# Patient Record
Sex: MALE | Race: WHITE | NOT HISPANIC OR LATINO | Employment: OTHER | URBAN - METROPOLITAN AREA
[De-identification: names, ages, dates, MRNs, and addresses within clinical notes are randomized per-mention and may not be internally consistent; named-entity substitution may affect disease eponyms.]

---

## 2020-06-05 ENCOUNTER — HOSPITAL ENCOUNTER (INPATIENT)
Facility: HOSPITAL | Age: 70
LOS: 1 days | DRG: 918 | End: 2020-06-07
Attending: EMERGENCY MEDICINE | Admitting: STUDENT IN AN ORGANIZED HEALTH CARE EDUCATION/TRAINING PROGRAM
Payer: MEDICARE

## 2020-06-05 DIAGNOSIS — F32.A DEPRESSION: ICD-10-CM

## 2020-06-05 DIAGNOSIS — T40.3X2A: Primary | ICD-10-CM

## 2020-06-05 DIAGNOSIS — T14.91XA SUICIDE ATTEMPT (HCC): ICD-10-CM

## 2020-06-05 DIAGNOSIS — F11.23 OPIATE WITHDRAWAL (HCC): ICD-10-CM

## 2020-06-05 LAB
ALBUMIN SERPL BCP-MCNC: 3.9 G/DL (ref 3.5–5)
ALP SERPL-CCNC: 62 U/L (ref 46–116)
ALT SERPL W P-5'-P-CCNC: 47 U/L (ref 12–78)
AMPHETAMINES SERPL QL SCN: NEGATIVE
ANION GAP SERPL CALCULATED.3IONS-SCNC: 11 MMOL/L (ref 4–13)
APAP SERPL-MCNC: <2 UG/ML (ref 10–20)
APTT PPP: 28 SECONDS (ref 23–37)
AST SERPL W P-5'-P-CCNC: 27 U/L (ref 5–45)
BARBITURATES UR QL: NEGATIVE
BASOPHILS # BLD AUTO: 0.05 THOUSANDS/ΜL (ref 0–0.1)
BASOPHILS NFR BLD AUTO: 0 % (ref 0–1)
BENZODIAZ UR QL: NEGATIVE
BILIRUB DIRECT SERPL-MCNC: 0.14 MG/DL (ref 0–0.2)
BILIRUB SERPL-MCNC: 0.3 MG/DL (ref 0.2–1)
BUN SERPL-MCNC: 11 MG/DL (ref 5–25)
CALCIUM SERPL-MCNC: 9.9 MG/DL (ref 8.3–10.1)
CHLORIDE SERPL-SCNC: 100 MMOL/L (ref 100–108)
CO2 SERPL-SCNC: 29 MMOL/L (ref 21–32)
COCAINE UR QL: NEGATIVE
CREAT SERPL-MCNC: 1.24 MG/DL (ref 0.6–1.3)
EOSINOPHIL # BLD AUTO: 0.01 THOUSAND/ΜL (ref 0–0.61)
EOSINOPHIL NFR BLD AUTO: 0 % (ref 0–6)
ERYTHROCYTE [DISTWIDTH] IN BLOOD BY AUTOMATED COUNT: 12.6 % (ref 11.6–15.1)
ETHANOL SERPL-MCNC: <3 MG/DL (ref 0–3)
GFR SERPL CREATININE-BSD FRML MDRD: 60 ML/MIN/1.73SQ M
GLUCOSE SERPL-MCNC: 154 MG/DL (ref 65–140)
HCT VFR BLD AUTO: 42.6 % (ref 36.5–49.3)
HGB BLD-MCNC: 14.2 G/DL (ref 12–17)
IMM GRANULOCYTES # BLD AUTO: 0.05 THOUSAND/UL (ref 0–0.2)
IMM GRANULOCYTES NFR BLD AUTO: 0 % (ref 0–2)
INR PPP: 1.03 (ref 0.84–1.19)
LYMPHOCYTES # BLD AUTO: 1.63 THOUSANDS/ΜL (ref 0.6–4.47)
LYMPHOCYTES NFR BLD AUTO: 13 % (ref 14–44)
MCH RBC QN AUTO: 32.5 PG (ref 26.8–34.3)
MCHC RBC AUTO-ENTMCNC: 33.3 G/DL (ref 31.4–37.4)
MCV RBC AUTO: 98 FL (ref 82–98)
METHADONE UR QL: POSITIVE
MONOCYTES # BLD AUTO: 0.63 THOUSAND/ΜL (ref 0.17–1.22)
MONOCYTES NFR BLD AUTO: 5 % (ref 4–12)
NEUTROPHILS # BLD AUTO: 10.31 THOUSANDS/ΜL (ref 1.85–7.62)
NEUTS SEG NFR BLD AUTO: 82 % (ref 43–75)
NRBC BLD AUTO-RTO: 0 /100 WBCS
OPIATES UR QL SCN: NEGATIVE
PCP UR QL: NEGATIVE
PLATELET # BLD AUTO: 231 THOUSANDS/UL (ref 149–390)
PMV BLD AUTO: 8.5 FL (ref 8.9–12.7)
POTASSIUM SERPL-SCNC: 4.1 MMOL/L (ref 3.5–5.3)
PROT SERPL-MCNC: 7.1 G/DL (ref 6.4–8.2)
PROTHROMBIN TIME: 13.5 SECONDS (ref 11.6–14.5)
RBC # BLD AUTO: 4.37 MILLION/UL (ref 3.88–5.62)
SALICYLATES SERPL-MCNC: 5 MG/DL (ref 3–20)
SODIUM SERPL-SCNC: 140 MMOL/L (ref 136–145)
THC UR QL: POSITIVE
TROPONIN I SERPL-MCNC: <0.02 NG/ML
WBC # BLD AUTO: 12.68 THOUSAND/UL (ref 4.31–10.16)

## 2020-06-05 PROCEDURE — 84484 ASSAY OF TROPONIN QUANT: CPT | Performed by: PHYSICIAN ASSISTANT

## 2020-06-05 PROCEDURE — NC001 PR NO CHARGE: Performed by: EMERGENCY MEDICINE

## 2020-06-05 PROCEDURE — 99220 PR INITIAL OBSERVATION CARE/DAY 70 MINUTES: CPT | Performed by: STUDENT IN AN ORGANIZED HEALTH CARE EDUCATION/TRAINING PROGRAM

## 2020-06-05 PROCEDURE — 96360 HYDRATION IV INFUSION INIT: CPT

## 2020-06-05 PROCEDURE — 80076 HEPATIC FUNCTION PANEL: CPT | Performed by: PHYSICIAN ASSISTANT

## 2020-06-05 PROCEDURE — 80307 DRUG TEST PRSMV CHEM ANLYZR: CPT | Performed by: PHYSICIAN ASSISTANT

## 2020-06-05 PROCEDURE — 99285 EMERGENCY DEPT VISIT HI MDM: CPT | Performed by: PHYSICIAN ASSISTANT

## 2020-06-05 PROCEDURE — 99449 NTRPROF PH1/NTRNET/EHR 31/>: CPT | Performed by: EMERGENCY MEDICINE

## 2020-06-05 PROCEDURE — 85025 COMPLETE CBC W/AUTO DIFF WBC: CPT | Performed by: PHYSICIAN ASSISTANT

## 2020-06-05 PROCEDURE — 93005 ELECTROCARDIOGRAM TRACING: CPT

## 2020-06-05 PROCEDURE — 80320 DRUG SCREEN QUANTALCOHOLS: CPT | Performed by: PHYSICIAN ASSISTANT

## 2020-06-05 PROCEDURE — 85730 THROMBOPLASTIN TIME PARTIAL: CPT | Performed by: PHYSICIAN ASSISTANT

## 2020-06-05 PROCEDURE — 99285 EMERGENCY DEPT VISIT HI MDM: CPT

## 2020-06-05 PROCEDURE — 85610 PROTHROMBIN TIME: CPT | Performed by: PHYSICIAN ASSISTANT

## 2020-06-05 PROCEDURE — 80048 BASIC METABOLIC PNL TOTAL CA: CPT | Performed by: PHYSICIAN ASSISTANT

## 2020-06-05 PROCEDURE — 36415 COLL VENOUS BLD VENIPUNCTURE: CPT | Performed by: PHYSICIAN ASSISTANT

## 2020-06-05 PROCEDURE — 80329 ANALGESICS NON-OPIOID 1 OR 2: CPT | Performed by: PHYSICIAN ASSISTANT

## 2020-06-05 RX ORDER — ONDANSETRON 2 MG/ML
4 INJECTION INTRAMUSCULAR; INTRAVENOUS EVERY 6 HOURS PRN
Status: DISCONTINUED | OUTPATIENT
Start: 2020-06-05 | End: 2020-06-07 | Stop reason: HOSPADM

## 2020-06-05 RX ORDER — ACETAMINOPHEN 325 MG/1
650 TABLET ORAL EVERY 6 HOURS PRN
Status: DISCONTINUED | OUTPATIENT
Start: 2020-06-05 | End: 2020-06-07 | Stop reason: HOSPADM

## 2020-06-05 RX ORDER — CLONIDINE HYDROCHLORIDE 0.1 MG/1
0.1 TABLET ORAL EVERY 12 HOURS SCHEDULED
Status: DISCONTINUED | OUTPATIENT
Start: 2020-06-05 | End: 2020-06-07 | Stop reason: HOSPADM

## 2020-06-05 RX ORDER — ONDANSETRON 2 MG/ML
1 INJECTION INTRAMUSCULAR; INTRAVENOUS ONCE
Status: COMPLETED | OUTPATIENT
Start: 2020-06-05 | End: 2020-06-05

## 2020-06-05 RX ORDER — METOCLOPRAMIDE HYDROCHLORIDE 5 MG/ML
10 INJECTION INTRAMUSCULAR; INTRAVENOUS EVERY 6 HOURS PRN
Status: DISCONTINUED | OUTPATIENT
Start: 2020-06-05 | End: 2020-06-06

## 2020-06-05 RX ORDER — NICOTINE 21 MG/24HR
1 PATCH, TRANSDERMAL 24 HOURS TRANSDERMAL DAILY
Status: DISCONTINUED | OUTPATIENT
Start: 2020-06-05 | End: 2020-06-07 | Stop reason: HOSPADM

## 2020-06-05 RX ORDER — MIRTAZAPINE 15 MG/1
15 TABLET, FILM COATED ORAL
Status: DISCONTINUED | OUTPATIENT
Start: 2020-06-05 | End: 2020-06-07 | Stop reason: HOSPADM

## 2020-06-05 RX ORDER — CALCIUM CARBONATE 200(500)MG
1000 TABLET,CHEWABLE ORAL DAILY PRN
Status: DISCONTINUED | OUTPATIENT
Start: 2020-06-05 | End: 2020-06-07 | Stop reason: HOSPADM

## 2020-06-05 RX ORDER — DOCUSATE SODIUM 100 MG/1
100 CAPSULE, LIQUID FILLED ORAL 2 TIMES DAILY
Status: DISCONTINUED | OUTPATIENT
Start: 2020-06-05 | End: 2020-06-07 | Stop reason: HOSPADM

## 2020-06-05 RX ORDER — SENNOSIDES 8.6 MG
1 TABLET ORAL DAILY
Status: DISCONTINUED | OUTPATIENT
Start: 2020-06-05 | End: 2020-06-07 | Stop reason: HOSPADM

## 2020-06-05 RX ORDER — LORAZEPAM 2 MG/ML
1 INJECTION INTRAMUSCULAR ONCE
Status: COMPLETED | OUTPATIENT
Start: 2020-06-05 | End: 2020-06-05

## 2020-06-05 RX ADMIN — LORAZEPAM 1 MG: 2 INJECTION INTRAMUSCULAR; INTRAVENOUS at 14:28

## 2020-06-05 RX ADMIN — SODIUM CHLORIDE 1000 ML: 0.9 INJECTION, SOLUTION INTRAVENOUS at 07:41

## 2020-06-05 RX ADMIN — CLONIDINE HYDROCHLORIDE 0.1 MG: 0.1 TABLET ORAL at 20:01

## 2020-06-05 RX ADMIN — CLONIDINE HYDROCHLORIDE 0.1 MG: 0.1 TABLET ORAL at 14:51

## 2020-06-05 RX ADMIN — MIRTAZAPINE 15 MG: 15 TABLET, FILM COATED ORAL at 22:31

## 2020-06-05 RX ADMIN — ENOXAPARIN SODIUM 40 MG: 40 INJECTION SUBCUTANEOUS at 13:17

## 2020-06-06 VITALS
HEART RATE: 54 BPM | WEIGHT: 162.48 LBS | RESPIRATION RATE: 18 BRPM | TEMPERATURE: 98.2 F | OXYGEN SATURATION: 98 % | HEIGHT: 70 IN | SYSTOLIC BLOOD PRESSURE: 135 MMHG | BODY MASS INDEX: 23.26 KG/M2 | DIASTOLIC BLOOD PRESSURE: 63 MMHG

## 2020-06-06 PROBLEM — F11.23 METHADONE WITHDRAWAL (HCC): Status: RESOLVED | Noted: 2020-06-05 | Resolved: 2020-06-06

## 2020-06-06 LAB
ALBUMIN SERPL BCP-MCNC: 3.6 G/DL (ref 3.5–5)
ALP SERPL-CCNC: 54 U/L (ref 46–116)
ALT SERPL W P-5'-P-CCNC: 40 U/L (ref 12–78)
ANION GAP SERPL CALCULATED.3IONS-SCNC: 4 MMOL/L (ref 4–13)
AST SERPL W P-5'-P-CCNC: 28 U/L (ref 5–45)
ATRIAL RATE: 50 BPM
ATRIAL RATE: 70 BPM
ATRIAL RATE: 86 BPM
BASOPHILS # BLD AUTO: 0.09 THOUSANDS/ΜL (ref 0–0.1)
BASOPHILS NFR BLD AUTO: 1 % (ref 0–1)
BILIRUB SERPL-MCNC: 0.5 MG/DL (ref 0.2–1)
BUN SERPL-MCNC: 9 MG/DL (ref 5–25)
CALCIUM SERPL-MCNC: 9.1 MG/DL (ref 8.3–10.1)
CHLORIDE SERPL-SCNC: 105 MMOL/L (ref 100–108)
CO2 SERPL-SCNC: 33 MMOL/L (ref 21–32)
CREAT SERPL-MCNC: 1 MG/DL (ref 0.6–1.3)
EOSINOPHIL # BLD AUTO: 0.27 THOUSAND/ΜL (ref 0–0.61)
EOSINOPHIL NFR BLD AUTO: 3 % (ref 0–6)
ERYTHROCYTE [DISTWIDTH] IN BLOOD BY AUTOMATED COUNT: 12.8 % (ref 11.6–15.1)
GFR SERPL CREATININE-BSD FRML MDRD: 78 ML/MIN/1.73SQ M
GLUCOSE SERPL-MCNC: 83 MG/DL (ref 65–140)
HCT VFR BLD AUTO: 41 % (ref 36.5–49.3)
HGB BLD-MCNC: 13.3 G/DL (ref 12–17)
IMM GRANULOCYTES # BLD AUTO: 0.02 THOUSAND/UL (ref 0–0.2)
IMM GRANULOCYTES NFR BLD AUTO: 0 % (ref 0–2)
LYMPHOCYTES # BLD AUTO: 3.73 THOUSANDS/ΜL (ref 0.6–4.47)
LYMPHOCYTES NFR BLD AUTO: 46 % (ref 14–44)
MAGNESIUM SERPL-MCNC: 2 MG/DL (ref 1.6–2.6)
MCH RBC QN AUTO: 32.5 PG (ref 26.8–34.3)
MCHC RBC AUTO-ENTMCNC: 32.4 G/DL (ref 31.4–37.4)
MCV RBC AUTO: 100 FL (ref 82–98)
MONOCYTES # BLD AUTO: 0.7 THOUSAND/ΜL (ref 0.17–1.22)
MONOCYTES NFR BLD AUTO: 9 % (ref 4–12)
NEUTROPHILS # BLD AUTO: 3.38 THOUSANDS/ΜL (ref 1.85–7.62)
NEUTS SEG NFR BLD AUTO: 41 % (ref 43–75)
NRBC BLD AUTO-RTO: 0 /100 WBCS
P AXIS: 79 DEGREES
P AXIS: 80 DEGREES
P AXIS: 91 DEGREES
PLATELET # BLD AUTO: 181 THOUSANDS/UL (ref 149–390)
PMV BLD AUTO: 8.5 FL (ref 8.9–12.7)
POTASSIUM SERPL-SCNC: 5 MMOL/L (ref 3.5–5.3)
PR INTERVAL: 160 MS
PR INTERVAL: 166 MS
PR INTERVAL: 168 MS
PROT SERPL-MCNC: 6.7 G/DL (ref 6.4–8.2)
QRS AXIS: 23 DEGREES
QRS AXIS: 47 DEGREES
QRS AXIS: 73 DEGREES
QRSD INTERVAL: 102 MS
QRSD INTERVAL: 94 MS
QRSD INTERVAL: 98 MS
QT INTERVAL: 330 MS
QT INTERVAL: 398 MS
QT INTERVAL: 466 MS
QTC INTERVAL: 394 MS
QTC INTERVAL: 424 MS
QTC INTERVAL: 429 MS
RBC # BLD AUTO: 4.09 MILLION/UL (ref 3.88–5.62)
SARS-COV-2 RNA RESP QL NAA+PROBE: NEGATIVE
SODIUM SERPL-SCNC: 142 MMOL/L (ref 136–145)
T WAVE AXIS: 58 DEGREES
T WAVE AXIS: 66 DEGREES
T WAVE AXIS: 69 DEGREES
VENTRICULAR RATE: 50 BPM
VENTRICULAR RATE: 70 BPM
VENTRICULAR RATE: 86 BPM
WBC # BLD AUTO: 8.19 THOUSAND/UL (ref 4.31–10.16)

## 2020-06-06 PROCEDURE — 93005 ELECTROCARDIOGRAM TRACING: CPT

## 2020-06-06 PROCEDURE — 87635 SARS-COV-2 COVID-19 AMP PRB: CPT | Performed by: STUDENT IN AN ORGANIZED HEALTH CARE EDUCATION/TRAINING PROGRAM

## 2020-06-06 PROCEDURE — 93010 ELECTROCARDIOGRAM REPORT: CPT | Performed by: INTERNAL MEDICINE

## 2020-06-06 PROCEDURE — 99239 HOSP IP/OBS DSCHRG MGMT >30: CPT | Performed by: STUDENT IN AN ORGANIZED HEALTH CARE EDUCATION/TRAINING PROGRAM

## 2020-06-06 PROCEDURE — 85025 COMPLETE CBC W/AUTO DIFF WBC: CPT | Performed by: STUDENT IN AN ORGANIZED HEALTH CARE EDUCATION/TRAINING PROGRAM

## 2020-06-06 PROCEDURE — 80053 COMPREHEN METABOLIC PANEL: CPT | Performed by: STUDENT IN AN ORGANIZED HEALTH CARE EDUCATION/TRAINING PROGRAM

## 2020-06-06 PROCEDURE — 83735 ASSAY OF MAGNESIUM: CPT | Performed by: STUDENT IN AN ORGANIZED HEALTH CARE EDUCATION/TRAINING PROGRAM

## 2020-06-06 RX ORDER — SENNOSIDES 8.6 MG
1 TABLET ORAL DAILY
Status: CANCELLED | OUTPATIENT
Start: 2020-06-07

## 2020-06-06 RX ORDER — CALCIUM CARBONATE 200(500)MG
1000 TABLET,CHEWABLE ORAL DAILY PRN
Status: CANCELLED | OUTPATIENT
Start: 2020-06-06

## 2020-06-06 RX ORDER — ACETAMINOPHEN 325 MG/1
325 TABLET ORAL EVERY 6 HOURS PRN
Status: CANCELLED | OUTPATIENT
Start: 2020-06-06

## 2020-06-06 RX ORDER — LORAZEPAM 2 MG/ML
1 INJECTION INTRAMUSCULAR ONCE
Status: COMPLETED | OUTPATIENT
Start: 2020-06-06 | End: 2020-06-06

## 2020-06-06 RX ORDER — MAGNESIUM HYDROXIDE/ALUMINUM HYDROXICE/SIMETHICONE 120; 1200; 1200 MG/30ML; MG/30ML; MG/30ML
15 SUSPENSION ORAL EVERY 4 HOURS PRN
Status: CANCELLED | OUTPATIENT
Start: 2020-06-06

## 2020-06-06 RX ORDER — METHOCARBAMOL 500 MG/1
500 TABLET, FILM COATED ORAL EVERY 6 HOURS PRN
Status: DISCONTINUED | OUTPATIENT
Start: 2020-06-06 | End: 2020-06-06

## 2020-06-06 RX ORDER — AMOXICILLIN 250 MG
1 CAPSULE ORAL DAILY PRN
Status: CANCELLED | OUTPATIENT
Start: 2020-06-06

## 2020-06-06 RX ORDER — NICOTINE 21 MG/24HR
1 PATCH, TRANSDERMAL 24 HOURS TRANSDERMAL DAILY
Status: CANCELLED | OUTPATIENT
Start: 2020-06-07

## 2020-06-06 RX ORDER — RISPERIDONE 0.5 MG/1
0.5 TABLET, ORALLY DISINTEGRATING ORAL EVERY 8 HOURS PRN
Status: CANCELLED | OUTPATIENT
Start: 2020-06-06

## 2020-06-06 RX ORDER — METHOCARBAMOL 500 MG/1
500 TABLET, FILM COATED ORAL EVERY 6 HOURS PRN
Status: CANCELLED | OUTPATIENT
Start: 2020-06-06 | End: 2020-06-08

## 2020-06-06 RX ORDER — ACETAMINOPHEN 325 MG/1
650 TABLET ORAL EVERY 6 HOURS PRN
Status: CANCELLED | OUTPATIENT
Start: 2020-06-06

## 2020-06-06 RX ORDER — DOCUSATE SODIUM 100 MG/1
100 CAPSULE, LIQUID FILLED ORAL 2 TIMES DAILY
Status: CANCELLED | OUTPATIENT
Start: 2020-06-07

## 2020-06-06 RX ORDER — HALOPERIDOL 5 MG/ML
2 INJECTION INTRAMUSCULAR EVERY 6 HOURS PRN
Status: CANCELLED | OUTPATIENT
Start: 2020-06-06

## 2020-06-06 RX ORDER — HALOPERIDOL 1 MG/1
2 TABLET ORAL EVERY 6 HOURS PRN
Status: CANCELLED | OUTPATIENT
Start: 2020-06-06

## 2020-06-06 RX ORDER — HYDROXYZINE HYDROCHLORIDE 25 MG/1
25 TABLET, FILM COATED ORAL EVERY 8 HOURS PRN
Status: CANCELLED | OUTPATIENT
Start: 2020-06-06

## 2020-06-06 RX ORDER — CLONIDINE HYDROCHLORIDE 0.1 MG/1
0.1 TABLET ORAL EVERY 12 HOURS SCHEDULED
Status: CANCELLED | OUTPATIENT
Start: 2020-06-07

## 2020-06-06 RX ORDER — ONDANSETRON 2 MG/ML
4 INJECTION INTRAMUSCULAR; INTRAVENOUS EVERY 6 HOURS PRN
Status: CANCELLED | OUTPATIENT
Start: 2020-06-06

## 2020-06-06 RX ORDER — MIRTAZAPINE 15 MG/1
15 TABLET, FILM COATED ORAL
Status: CANCELLED | OUTPATIENT
Start: 2020-06-07

## 2020-06-06 RX ADMIN — LORAZEPAM 1 MG: 2 INJECTION INTRAMUSCULAR; INTRAVENOUS at 16:33

## 2020-06-06 RX ADMIN — CLONIDINE HYDROCHLORIDE 0.1 MG: 0.1 TABLET ORAL at 08:12

## 2020-06-06 RX ADMIN — MIRTAZAPINE 15 MG: 15 TABLET, FILM COATED ORAL at 22:54

## 2020-06-06 RX ADMIN — CLONIDINE HYDROCHLORIDE 0.1 MG: 0.1 TABLET ORAL at 22:54

## 2020-06-06 RX ADMIN — ENOXAPARIN SODIUM 40 MG: 40 INJECTION SUBCUTANEOUS at 08:13

## 2020-06-07 ENCOUNTER — HOSPITAL ENCOUNTER (INPATIENT)
Facility: HOSPITAL | Age: 70
LOS: 10 days | Discharge: HOME | DRG: 881 | End: 2020-06-17
Attending: PSYCHIATRY & NEUROLOGY | Admitting: PSYCHIATRY & NEUROLOGY
Payer: MEDICARE

## 2020-06-07 DIAGNOSIS — J45.909 ASTHMA: ICD-10-CM

## 2020-06-07 DIAGNOSIS — F32.9 MAJOR DEPRESSIVE DISORDER WITHOUT PSYCHOTIC FEATURES: Primary | ICD-10-CM

## 2020-06-07 DIAGNOSIS — G89.4 CHRONIC PAIN SYNDROME: ICD-10-CM

## 2020-06-07 DIAGNOSIS — F11.23 OPIATE WITHDRAWAL (HCC): ICD-10-CM

## 2020-06-07 PROCEDURE — 99221 1ST HOSP IP/OBS SF/LOW 40: CPT | Performed by: PSYCHIATRY & NEUROLOGY

## 2020-06-07 PROCEDURE — 93005 ELECTROCARDIOGRAM TRACING: CPT

## 2020-06-07 RX ORDER — MIRTAZAPINE 15 MG/1
15 TABLET, FILM COATED ORAL
Status: DISCONTINUED | OUTPATIENT
Start: 2020-06-07 | End: 2020-06-09

## 2020-06-07 RX ORDER — METHOCARBAMOL 500 MG/1
500 TABLET, FILM COATED ORAL EVERY 6 HOURS PRN
Status: DISPENSED | OUTPATIENT
Start: 2020-06-07 | End: 2020-06-09

## 2020-06-07 RX ORDER — AMOXICILLIN 250 MG
1 CAPSULE ORAL DAILY PRN
Status: DISCONTINUED | OUTPATIENT
Start: 2020-06-07 | End: 2020-06-17 | Stop reason: HOSPADM

## 2020-06-07 RX ORDER — SENNOSIDES 8.6 MG
1 TABLET ORAL DAILY
Status: DISCONTINUED | OUTPATIENT
Start: 2020-06-07 | End: 2020-06-08

## 2020-06-07 RX ORDER — MAGNESIUM HYDROXIDE/ALUMINUM HYDROXICE/SIMETHICONE 120; 1200; 1200 MG/30ML; MG/30ML; MG/30ML
15 SUSPENSION ORAL EVERY 4 HOURS PRN
Status: DISCONTINUED | OUTPATIENT
Start: 2020-06-07 | End: 2020-06-17 | Stop reason: HOSPADM

## 2020-06-07 RX ORDER — ACETAMINOPHEN 325 MG/1
650 TABLET ORAL EVERY 6 HOURS PRN
Status: DISCONTINUED | OUTPATIENT
Start: 2020-06-07 | End: 2020-06-08

## 2020-06-07 RX ORDER — DOCUSATE SODIUM 100 MG/1
100 CAPSULE, LIQUID FILLED ORAL 2 TIMES DAILY
Status: DISCONTINUED | OUTPATIENT
Start: 2020-06-07 | End: 2020-06-08

## 2020-06-07 RX ORDER — CLONIDINE HYDROCHLORIDE 0.1 MG/1
0.1 TABLET ORAL EVERY 12 HOURS SCHEDULED
Status: DISCONTINUED | OUTPATIENT
Start: 2020-06-07 | End: 2020-06-08

## 2020-06-07 RX ORDER — HYDROXYZINE HYDROCHLORIDE 25 MG/1
25 TABLET, FILM COATED ORAL EVERY 8 HOURS PRN
Status: DISCONTINUED | OUTPATIENT
Start: 2020-06-07 | End: 2020-06-17 | Stop reason: HOSPADM

## 2020-06-07 RX ORDER — HALOPERIDOL 2 MG/1
2 TABLET ORAL EVERY 6 HOURS PRN
Status: DISCONTINUED | OUTPATIENT
Start: 2020-06-07 | End: 2020-06-17 | Stop reason: HOSPADM

## 2020-06-07 RX ORDER — ONDANSETRON 4 MG/1
4 TABLET, ORALLY DISINTEGRATING ORAL EVERY 6 HOURS PRN
Status: DISCONTINUED | OUTPATIENT
Start: 2020-06-07 | End: 2020-06-17 | Stop reason: HOSPADM

## 2020-06-07 RX ORDER — CLONIDINE HYDROCHLORIDE 0.1 MG/1
0.1 TABLET ORAL ONCE
Status: COMPLETED | OUTPATIENT
Start: 2020-06-07 | End: 2020-06-07

## 2020-06-07 RX ORDER — ACETAMINOPHEN 325 MG/1
325 TABLET ORAL EVERY 6 HOURS PRN
Status: DISCONTINUED | OUTPATIENT
Start: 2020-06-07 | End: 2020-06-17 | Stop reason: HOSPADM

## 2020-06-07 RX ORDER — LORAZEPAM 1 MG/1
2 TABLET ORAL ONCE
Status: COMPLETED | OUTPATIENT
Start: 2020-06-07 | End: 2020-06-07

## 2020-06-07 RX ORDER — CALCIUM CARBONATE 200(500)MG
1000 TABLET,CHEWABLE ORAL DAILY PRN
Status: DISCONTINUED | OUTPATIENT
Start: 2020-06-07 | End: 2020-06-17 | Stop reason: HOSPADM

## 2020-06-07 RX ORDER — ACETAMINOPHEN 325 MG/1
650 TABLET ORAL EVERY 6 HOURS PRN
Status: DISCONTINUED | OUTPATIENT
Start: 2020-06-07 | End: 2020-06-17 | Stop reason: HOSPADM

## 2020-06-07 RX ORDER — RISPERIDONE 0.5 MG/1
0.5 TABLET, ORALLY DISINTEGRATING ORAL EVERY 8 HOURS PRN
Status: DISCONTINUED | OUTPATIENT
Start: 2020-06-07 | End: 2020-06-17 | Stop reason: HOSPADM

## 2020-06-07 RX ORDER — ONDANSETRON 2 MG/ML
4 INJECTION INTRAMUSCULAR; INTRAVENOUS EVERY 6 HOURS PRN
Status: DISCONTINUED | OUTPATIENT
Start: 2020-06-07 | End: 2020-06-07

## 2020-06-07 RX ORDER — NICOTINE 21 MG/24HR
1 PATCH, TRANSDERMAL 24 HOURS TRANSDERMAL DAILY
Status: DISCONTINUED | OUTPATIENT
Start: 2020-06-07 | End: 2020-06-08

## 2020-06-07 RX ORDER — HALOPERIDOL 5 MG/ML
2 INJECTION INTRAMUSCULAR EVERY 6 HOURS PRN
Status: DISCONTINUED | OUTPATIENT
Start: 2020-06-07 | End: 2020-06-17 | Stop reason: HOSPADM

## 2020-06-07 RX ADMIN — HYDROXYZINE HYDROCHLORIDE 25 MG: 25 TABLET, FILM COATED ORAL at 02:01

## 2020-06-07 RX ADMIN — MIRTAZAPINE 15 MG: 15 TABLET, FILM COATED ORAL at 21:44

## 2020-06-07 RX ADMIN — CLONIDINE HYDROCHLORIDE 0.1 MG: 0.1 TABLET ORAL at 02:00

## 2020-06-07 RX ADMIN — ONDANSETRON 4 MG: 2 INJECTION INTRAMUSCULAR; INTRAVENOUS at 02:00

## 2020-06-07 RX ADMIN — METHOCARBAMOL TABLETS 500 MG: 500 TABLET, COATED ORAL at 02:01

## 2020-06-07 RX ADMIN — LORAZEPAM 2 MG: 1 TABLET ORAL at 02:00

## 2020-06-07 RX ADMIN — CALCIUM CARBONATE (ANTACID) CHEW TAB 500 MG 1000 MG: 500 CHEW TAB at 02:02

## 2020-06-08 PROBLEM — J45.909 ASTHMA: Status: ACTIVE | Noted: 2020-06-08

## 2020-06-08 PROBLEM — F11.11 H/O OPIOID ABUSE (HCC): Status: ACTIVE | Noted: 2020-06-08

## 2020-06-08 PROBLEM — G89.29 CHRONIC PAIN: Status: ACTIVE | Noted: 2020-06-08

## 2020-06-08 LAB
ATRIAL RATE: 55 BPM
ATRIAL RATE: 57 BPM
ATRIAL RATE: 57 BPM
ATRIAL RATE: 59 BPM
P AXIS: 41 DEGREES
P AXIS: 46 DEGREES
P AXIS: 60 DEGREES
P AXIS: 69 DEGREES
PR INTERVAL: 152 MS
PR INTERVAL: 162 MS
PR INTERVAL: 168 MS
PR INTERVAL: 172 MS
QRS AXIS: -6 DEGREES
QRS AXIS: 17 DEGREES
QRS AXIS: 9 DEGREES
QRS AXIS: 9 DEGREES
QRSD INTERVAL: 100 MS
QRSD INTERVAL: 102 MS
QRSD INTERVAL: 96 MS
QRSD INTERVAL: 98 MS
QT INTERVAL: 432 MS
QT INTERVAL: 446 MS
QT INTERVAL: 450 MS
QT INTERVAL: 452 MS
QTC INTERVAL: 426 MS
QTC INTERVAL: 427 MS
QTC INTERVAL: 438 MS
QTC INTERVAL: 439 MS
T WAVE AXIS: 37 DEGREES
T WAVE AXIS: 40 DEGREES
T WAVE AXIS: 40 DEGREES
T WAVE AXIS: 45 DEGREES
VENTRICULAR RATE: 55 BPM
VENTRICULAR RATE: 57 BPM
VENTRICULAR RATE: 57 BPM
VENTRICULAR RATE: 59 BPM

## 2020-06-08 PROCEDURE — 93010 ELECTROCARDIOGRAM REPORT: CPT | Performed by: INTERNAL MEDICINE

## 2020-06-08 PROCEDURE — 99231 SBSQ HOSP IP/OBS SF/LOW 25: CPT | Performed by: PSYCHIATRY & NEUROLOGY

## 2020-06-08 PROCEDURE — 93005 ELECTROCARDIOGRAM TRACING: CPT

## 2020-06-08 RX ORDER — METHADONE HYDROCHLORIDE 10 MG/1
80 TABLET ORAL DAILY
Status: DISCONTINUED | OUTPATIENT
Start: 2020-06-08 | End: 2020-06-08

## 2020-06-08 RX ORDER — ALBUTEROL SULFATE 90 UG/1
2 AEROSOL, METERED RESPIRATORY (INHALATION) EVERY 4 HOURS PRN
Status: DISCONTINUED | OUTPATIENT
Start: 2020-06-08 | End: 2020-06-17 | Stop reason: HOSPADM

## 2020-06-08 RX ORDER — METHADONE HYDROCHLORIDE 10 MG/ML
84 CONCENTRATE ORAL DAILY
Status: DISCONTINUED | OUTPATIENT
Start: 2020-06-09 | End: 2020-06-17 | Stop reason: HOSPADM

## 2020-06-08 RX ORDER — METHADONE HYDROCHLORIDE 10 MG/ML
84 CONCENTRATE ORAL DAILY
Status: DISCONTINUED | OUTPATIENT
Start: 2020-06-08 | End: 2020-06-08

## 2020-06-08 RX ORDER — METHADONE HYDROCHLORIDE 10 MG/1
80 TABLET ORAL ONCE
Status: COMPLETED | OUTPATIENT
Start: 2020-06-08 | End: 2020-06-08

## 2020-06-08 RX ADMIN — DOCUSATE SODIUM 100 MG: 100 CAPSULE, LIQUID FILLED ORAL at 09:13

## 2020-06-08 RX ADMIN — HYDROXYZINE HYDROCHLORIDE 25 MG: 25 TABLET, FILM COATED ORAL at 21:50

## 2020-06-08 RX ADMIN — MIRTAZAPINE 15 MG: 15 TABLET, FILM COATED ORAL at 21:50

## 2020-06-08 RX ADMIN — METHADONE HYDROCHLORIDE 80 MG: 10 TABLET ORAL at 14:07

## 2020-06-08 RX ADMIN — HYDROXYZINE HYDROCHLORIDE 25 MG: 25 TABLET, FILM COATED ORAL at 09:35

## 2020-06-09 PROCEDURE — 99231 SBSQ HOSP IP/OBS SF/LOW 25: CPT | Performed by: PSYCHIATRY & NEUROLOGY

## 2020-06-09 RX ORDER — TRAZODONE HYDROCHLORIDE 50 MG/1
50 TABLET ORAL
Status: DISCONTINUED | OUTPATIENT
Start: 2020-06-09 | End: 2020-06-17 | Stop reason: HOSPADM

## 2020-06-09 RX ORDER — MIRTAZAPINE 15 MG/1
22.5 TABLET, FILM COATED ORAL
Status: DISCONTINUED | OUTPATIENT
Start: 2020-06-09 | End: 2020-06-10

## 2020-06-09 RX ADMIN — HYDROXYZINE HYDROCHLORIDE 25 MG: 25 TABLET, FILM COATED ORAL at 21:29

## 2020-06-09 RX ADMIN — MIRTAZAPINE 22.5 MG: 15 TABLET, FILM COATED ORAL at 21:29

## 2020-06-09 RX ADMIN — METHADONE HYDROCHLORIDE 84 MG: 10 CONCENTRATE ORAL at 09:43

## 2020-06-10 LAB
ATRIAL RATE: 57 BPM
ATRIAL RATE: 65 BPM
P AXIS: 69 DEGREES
P AXIS: 82 DEGREES
PR INTERVAL: 172 MS
PR INTERVAL: 176 MS
QRS AXIS: 22 DEGREES
QRS AXIS: 24 DEGREES
QRSD INTERVAL: 102 MS
QRSD INTERVAL: 98 MS
QT INTERVAL: 442 MS
QT INTERVAL: 472 MS
QTC INTERVAL: 459 MS
QTC INTERVAL: 459 MS
T WAVE AXIS: 58 DEGREES
T WAVE AXIS: 66 DEGREES
VENTRICULAR RATE: 57 BPM
VENTRICULAR RATE: 65 BPM

## 2020-06-10 PROCEDURE — 99232 SBSQ HOSP IP/OBS MODERATE 35: CPT | Performed by: PSYCHIATRY & NEUROLOGY

## 2020-06-10 PROCEDURE — 93010 ELECTROCARDIOGRAM REPORT: CPT | Performed by: INTERNAL MEDICINE

## 2020-06-10 RX ORDER — MIRTAZAPINE 15 MG/1
30 TABLET, FILM COATED ORAL
Status: DISCONTINUED | OUTPATIENT
Start: 2020-06-10 | End: 2020-06-17 | Stop reason: HOSPADM

## 2020-06-10 RX ADMIN — METHADONE HYDROCHLORIDE 84 MG: 10 CONCENTRATE ORAL at 08:24

## 2020-06-10 RX ADMIN — MIRTAZAPINE 30 MG: 15 TABLET, FILM COATED ORAL at 21:17

## 2020-06-11 PROCEDURE — 99232 SBSQ HOSP IP/OBS MODERATE 35: CPT | Performed by: NURSE PRACTITIONER

## 2020-06-11 RX ADMIN — MIRTAZAPINE 30 MG: 15 TABLET, FILM COATED ORAL at 20:42

## 2020-06-11 RX ADMIN — METHADONE HYDROCHLORIDE 84 MG: 10 CONCENTRATE ORAL at 08:20

## 2020-06-12 PROCEDURE — 99231 SBSQ HOSP IP/OBS SF/LOW 25: CPT | Performed by: PSYCHIATRY & NEUROLOGY

## 2020-06-12 RX ORDER — GABAPENTIN 100 MG/1
100 CAPSULE ORAL 3 TIMES DAILY
Status: DISCONTINUED | OUTPATIENT
Start: 2020-06-13 | End: 2020-06-17 | Stop reason: HOSPADM

## 2020-06-12 RX ADMIN — MIRTAZAPINE 30 MG: 15 TABLET, FILM COATED ORAL at 21:13

## 2020-06-12 RX ADMIN — METHADONE HYDROCHLORIDE 84 MG: 10 CONCENTRATE ORAL at 08:11

## 2020-06-13 PROCEDURE — 99232 SBSQ HOSP IP/OBS MODERATE 35: CPT | Performed by: NURSE PRACTITIONER

## 2020-06-13 RX ORDER — DIPHENHYDRAMINE HYDROCHLORIDE, ZINC ACETATE 2; .1 G/100G; G/100G
CREAM TOPICAL 3 TIMES DAILY PRN
Status: DISCONTINUED | OUTPATIENT
Start: 2020-06-13 | End: 2020-06-17 | Stop reason: HOSPADM

## 2020-06-13 RX ADMIN — MIRTAZAPINE 30 MG: 15 TABLET, FILM COATED ORAL at 20:39

## 2020-06-13 RX ADMIN — METHADONE HYDROCHLORIDE 84 MG: 10 CONCENTRATE ORAL at 08:15

## 2020-06-13 RX ADMIN — GABAPENTIN 100 MG: 100 CAPSULE ORAL at 20:40

## 2020-06-13 RX ADMIN — GABAPENTIN 100 MG: 100 CAPSULE ORAL at 16:00

## 2020-06-13 RX ADMIN — DIPHENHYDRAMINE HYDROCHLORIDE, ZINC ACETATE: 2; .1 CREAM TOPICAL at 16:02

## 2020-06-13 RX ADMIN — GABAPENTIN 100 MG: 100 CAPSULE ORAL at 08:15

## 2020-06-13 RX ADMIN — HYDROXYZINE HYDROCHLORIDE 25 MG: 25 TABLET, FILM COATED ORAL at 17:05

## 2020-06-14 PROCEDURE — 99232 SBSQ HOSP IP/OBS MODERATE 35: CPT | Performed by: NURSE PRACTITIONER

## 2020-06-14 RX ADMIN — DIPHENHYDRAMINE HYDROCHLORIDE, ZINC ACETATE 1 APPLICATION: 2; .1 CREAM TOPICAL at 01:00

## 2020-06-14 RX ADMIN — GABAPENTIN 100 MG: 100 CAPSULE ORAL at 16:21

## 2020-06-14 RX ADMIN — DIPHENHYDRAMINE HYDROCHLORIDE, ZINC ACETATE 1 APPLICATION: 2; .1 CREAM TOPICAL at 09:56

## 2020-06-14 RX ADMIN — MIRTAZAPINE 30 MG: 15 TABLET, FILM COATED ORAL at 21:12

## 2020-06-14 RX ADMIN — GABAPENTIN 100 MG: 100 CAPSULE ORAL at 08:10

## 2020-06-14 RX ADMIN — GABAPENTIN 100 MG: 100 CAPSULE ORAL at 21:12

## 2020-06-14 RX ADMIN — METHADONE HYDROCHLORIDE 84 MG: 10 CONCENTRATE ORAL at 08:10

## 2020-06-15 PROCEDURE — 99232 SBSQ HOSP IP/OBS MODERATE 35: CPT | Performed by: PSYCHIATRY & NEUROLOGY

## 2020-06-15 RX ORDER — NAPROXEN 500 MG/1
250 TABLET ORAL 2 TIMES DAILY PRN
Status: DISCONTINUED | OUTPATIENT
Start: 2020-06-15 | End: 2020-06-17 | Stop reason: HOSPADM

## 2020-06-15 RX ADMIN — GABAPENTIN 100 MG: 100 CAPSULE ORAL at 08:30

## 2020-06-15 RX ADMIN — HYDROXYZINE HYDROCHLORIDE 25 MG: 25 TABLET, FILM COATED ORAL at 12:50

## 2020-06-15 RX ADMIN — GABAPENTIN 100 MG: 100 CAPSULE ORAL at 16:56

## 2020-06-15 RX ADMIN — HYDROXYZINE HYDROCHLORIDE 25 MG: 25 TABLET, FILM COATED ORAL at 21:46

## 2020-06-15 RX ADMIN — GABAPENTIN 100 MG: 100 CAPSULE ORAL at 21:30

## 2020-06-15 RX ADMIN — METHADONE HYDROCHLORIDE 84 MG: 10 CONCENTRATE ORAL at 08:30

## 2020-06-15 RX ADMIN — MIRTAZAPINE 30 MG: 15 TABLET, FILM COATED ORAL at 21:30

## 2020-06-16 PROCEDURE — 99232 SBSQ HOSP IP/OBS MODERATE 35: CPT | Performed by: PSYCHIATRY & NEUROLOGY

## 2020-06-16 RX ORDER — ALBUTEROL SULFATE 90 UG/1
2 AEROSOL, METERED RESPIRATORY (INHALATION) EVERY 4 HOURS PRN
Qty: 1 INHALER | Refills: 0 | Status: ON HOLD | OUTPATIENT
Start: 2020-06-16 | End: 2020-08-31 | Stop reason: SDUPTHER

## 2020-06-16 RX ORDER — MIRTAZAPINE 30 MG/1
30 TABLET, FILM COATED ORAL
Qty: 30 TABLET | Refills: 1 | Status: SHIPPED | OUTPATIENT
Start: 2020-06-16 | End: 2020-08-11

## 2020-06-16 RX ORDER — GABAPENTIN 100 MG/1
100 CAPSULE ORAL 3 TIMES DAILY
Qty: 90 CAPSULE | Refills: 1 | Status: SHIPPED | OUTPATIENT
Start: 2020-06-16 | End: 2020-08-12

## 2020-06-16 RX ADMIN — NAPROXEN 250 MG: 500 TABLET ORAL at 17:07

## 2020-06-16 RX ADMIN — GABAPENTIN 100 MG: 100 CAPSULE ORAL at 16:39

## 2020-06-16 RX ADMIN — GABAPENTIN 100 MG: 100 CAPSULE ORAL at 08:35

## 2020-06-16 RX ADMIN — METHADONE HYDROCHLORIDE 84 MG: 10 CONCENTRATE ORAL at 08:35

## 2020-06-16 RX ADMIN — MIRTAZAPINE 30 MG: 15 TABLET, FILM COATED ORAL at 21:21

## 2020-06-16 RX ADMIN — GABAPENTIN 100 MG: 100 CAPSULE ORAL at 21:21

## 2020-06-17 VITALS
SYSTOLIC BLOOD PRESSURE: 133 MMHG | HEART RATE: 72 BPM | RESPIRATION RATE: 16 BRPM | TEMPERATURE: 97.6 F | DIASTOLIC BLOOD PRESSURE: 61 MMHG | WEIGHT: 155.4 LBS | BODY MASS INDEX: 22.25 KG/M2 | OXYGEN SATURATION: 95 % | HEIGHT: 70 IN

## 2020-06-17 PROBLEM — F32.9 MAJOR DEPRESSIVE DISORDER WITHOUT PSYCHOTIC FEATURES: Status: RESOLVED | Noted: 2020-06-07 | Resolved: 2020-06-17

## 2020-06-17 PROCEDURE — 99238 HOSP IP/OBS DSCHRG MGMT 30/<: CPT | Performed by: PSYCHIATRY & NEUROLOGY

## 2020-06-17 RX ADMIN — GABAPENTIN 100 MG: 100 CAPSULE ORAL at 08:23

## 2020-06-17 RX ADMIN — METHADONE HYDROCHLORIDE 84 MG: 10 CONCENTRATE ORAL at 08:23

## 2020-08-11 DIAGNOSIS — F32.9 MAJOR DEPRESSIVE DISORDER WITHOUT PSYCHOTIC FEATURES: ICD-10-CM

## 2020-08-11 RX ORDER — MIRTAZAPINE 30 MG/1
30 TABLET, FILM COATED ORAL
Qty: 30 TABLET | Refills: 1 | Status: SHIPPED | OUTPATIENT
Start: 2020-08-11 | End: 2020-09-02 | Stop reason: HOSPADM

## 2020-08-12 DIAGNOSIS — F32.9 MAJOR DEPRESSIVE DISORDER WITHOUT PSYCHOTIC FEATURES: ICD-10-CM

## 2020-08-12 DIAGNOSIS — G89.4 CHRONIC PAIN SYNDROME: ICD-10-CM

## 2020-08-12 RX ORDER — GABAPENTIN 100 MG/1
CAPSULE ORAL
Qty: 90 CAPSULE | Refills: 1 | Status: ON HOLD | OUTPATIENT
Start: 2020-08-12 | End: 2020-08-31 | Stop reason: SDUPTHER

## 2020-08-18 ENCOUNTER — APPOINTMENT (EMERGENCY)
Dept: CT IMAGING | Facility: HOSPITAL | Age: 70
End: 2020-08-18
Payer: MEDICARE

## 2020-08-18 ENCOUNTER — HOSPITAL ENCOUNTER (EMERGENCY)
Facility: HOSPITAL | Age: 70
Discharge: OTHER INSTITUTION | End: 2020-08-20
Attending: EMERGENCY MEDICINE | Admitting: EMERGENCY MEDICINE
Payer: MEDICARE

## 2020-08-18 DIAGNOSIS — F32.A DEPRESSION WITH SUICIDAL IDEATION: Primary | ICD-10-CM

## 2020-08-18 DIAGNOSIS — R11.0 NAUSEA: ICD-10-CM

## 2020-08-18 DIAGNOSIS — R45.851 DEPRESSION WITH SUICIDAL IDEATION: Primary | ICD-10-CM

## 2020-08-18 DIAGNOSIS — R10.13 EPIGASTRIC PAIN: ICD-10-CM

## 2020-08-18 LAB
ALBUMIN SERPL BCP-MCNC: 3.6 G/DL (ref 3.5–5)
ALP SERPL-CCNC: 72 U/L (ref 46–116)
ALT SERPL W P-5'-P-CCNC: 54 U/L (ref 12–78)
ANION GAP SERPL CALCULATED.3IONS-SCNC: 9 MMOL/L (ref 4–13)
AST SERPL W P-5'-P-CCNC: 40 U/L (ref 5–45)
ATRIAL RATE: 75 BPM
BASOPHILS # BLD AUTO: 0.07 THOUSANDS/ΜL (ref 0–0.1)
BASOPHILS NFR BLD AUTO: 1 % (ref 0–1)
BILIRUB DIRECT SERPL-MCNC: 0.14 MG/DL (ref 0–0.2)
BILIRUB SERPL-MCNC: 0.5 MG/DL (ref 0.2–1)
BUN SERPL-MCNC: 15 MG/DL (ref 5–25)
CALCIUM SERPL-MCNC: 8.9 MG/DL (ref 8.3–10.1)
CHLORIDE SERPL-SCNC: 107 MMOL/L (ref 100–108)
CO2 SERPL-SCNC: 27 MMOL/L (ref 21–32)
CREAT SERPL-MCNC: 1.24 MG/DL (ref 0.6–1.3)
EOSINOPHIL # BLD AUTO: 0.17 THOUSAND/ΜL (ref 0–0.61)
EOSINOPHIL NFR BLD AUTO: 2 % (ref 0–6)
ERYTHROCYTE [DISTWIDTH] IN BLOOD BY AUTOMATED COUNT: 13.2 % (ref 11.6–15.1)
ETHANOL EXG-MCNC: 0 MG/DL
GFR SERPL CREATININE-BSD FRML MDRD: 59 ML/MIN/1.73SQ M
GLUCOSE SERPL-MCNC: 92 MG/DL (ref 65–140)
HCT VFR BLD AUTO: 44.5 % (ref 36.5–49.3)
HGB BLD-MCNC: 14.9 G/DL (ref 12–17)
IMM GRANULOCYTES # BLD AUTO: 0.02 THOUSAND/UL (ref 0–0.2)
IMM GRANULOCYTES NFR BLD AUTO: 0 % (ref 0–2)
LIPASE SERPL-CCNC: 120 U/L (ref 73–393)
LYMPHOCYTES # BLD AUTO: 2.53 THOUSANDS/ΜL (ref 0.6–4.47)
LYMPHOCYTES NFR BLD AUTO: 31 % (ref 14–44)
MCH RBC QN AUTO: 32.5 PG (ref 26.8–34.3)
MCHC RBC AUTO-ENTMCNC: 33.5 G/DL (ref 31.4–37.4)
MCV RBC AUTO: 97 FL (ref 82–98)
MONOCYTES # BLD AUTO: 0.91 THOUSAND/ΜL (ref 0.17–1.22)
MONOCYTES NFR BLD AUTO: 11 % (ref 4–12)
NEUTROPHILS # BLD AUTO: 4.51 THOUSANDS/ΜL (ref 1.85–7.62)
NEUTS SEG NFR BLD AUTO: 55 % (ref 43–75)
NRBC BLD AUTO-RTO: 0 /100 WBCS
P AXIS: 66 DEGREES
PLATELET # BLD AUTO: 215 THOUSANDS/UL (ref 149–390)
PMV BLD AUTO: 8.4 FL (ref 8.9–12.7)
POTASSIUM SERPL-SCNC: 3.9 MMOL/L (ref 3.5–5.3)
PR INTERVAL: 164 MS
PROT SERPL-MCNC: 7 G/DL (ref 6.4–8.2)
QRS AXIS: 46 DEGREES
QRSD INTERVAL: 96 MS
QT INTERVAL: 408 MS
QTC INTERVAL: 455 MS
RBC # BLD AUTO: 4.58 MILLION/UL (ref 3.88–5.62)
SODIUM SERPL-SCNC: 143 MMOL/L (ref 136–145)
T WAVE AXIS: 47 DEGREES
TROPONIN I SERPL-MCNC: <0.02 NG/ML
VENTRICULAR RATE: 75 BPM
WBC # BLD AUTO: 8.21 THOUSAND/UL (ref 4.31–10.16)

## 2020-08-18 PROCEDURE — 96374 THER/PROPH/DIAG INJ IV PUSH: CPT

## 2020-08-18 PROCEDURE — 83690 ASSAY OF LIPASE: CPT | Performed by: EMERGENCY MEDICINE

## 2020-08-18 PROCEDURE — 93005 ELECTROCARDIOGRAM TRACING: CPT

## 2020-08-18 PROCEDURE — 82075 ASSAY OF BREATH ETHANOL: CPT | Performed by: EMERGENCY MEDICINE

## 2020-08-18 PROCEDURE — 93010 ELECTROCARDIOGRAM REPORT: CPT | Performed by: INTERNAL MEDICINE

## 2020-08-18 PROCEDURE — 96375 TX/PRO/DX INJ NEW DRUG ADDON: CPT

## 2020-08-18 PROCEDURE — 36415 COLL VENOUS BLD VENIPUNCTURE: CPT | Performed by: EMERGENCY MEDICINE

## 2020-08-18 PROCEDURE — 99284 EMERGENCY DEPT VISIT MOD MDM: CPT | Performed by: EMERGENCY MEDICINE

## 2020-08-18 PROCEDURE — 99285 EMERGENCY DEPT VISIT HI MDM: CPT

## 2020-08-18 PROCEDURE — C9113 INJ PANTOPRAZOLE SODIUM, VIA: HCPCS | Performed by: EMERGENCY MEDICINE

## 2020-08-18 PROCEDURE — 74177 CT ABD & PELVIS W/CONTRAST: CPT

## 2020-08-18 PROCEDURE — 84484 ASSAY OF TROPONIN QUANT: CPT | Performed by: EMERGENCY MEDICINE

## 2020-08-18 PROCEDURE — 84439 ASSAY OF FREE THYROXINE: CPT | Performed by: EMERGENCY MEDICINE

## 2020-08-18 PROCEDURE — 80048 BASIC METABOLIC PNL TOTAL CA: CPT | Performed by: EMERGENCY MEDICINE

## 2020-08-18 PROCEDURE — 85025 COMPLETE CBC W/AUTO DIFF WBC: CPT | Performed by: EMERGENCY MEDICINE

## 2020-08-18 PROCEDURE — 84443 ASSAY THYROID STIM HORMONE: CPT | Performed by: EMERGENCY MEDICINE

## 2020-08-18 PROCEDURE — 80076 HEPATIC FUNCTION PANEL: CPT | Performed by: EMERGENCY MEDICINE

## 2020-08-18 PROCEDURE — 96361 HYDRATE IV INFUSION ADD-ON: CPT

## 2020-08-18 PROCEDURE — G1004 CDSM NDSC: HCPCS

## 2020-08-18 RX ORDER — LIDOCAINE HYDROCHLORIDE 20 MG/ML
15 SOLUTION OROPHARYNGEAL ONCE
Status: COMPLETED | OUTPATIENT
Start: 2020-08-18 | End: 2020-08-18

## 2020-08-18 RX ORDER — LORAZEPAM 2 MG/ML
1 INJECTION INTRAMUSCULAR ONCE
Status: COMPLETED | OUTPATIENT
Start: 2020-08-18 | End: 2020-08-18

## 2020-08-18 RX ORDER — PANTOPRAZOLE SODIUM 40 MG/1
40 INJECTION, POWDER, FOR SOLUTION INTRAVENOUS ONCE
Status: COMPLETED | OUTPATIENT
Start: 2020-08-18 | End: 2020-08-18

## 2020-08-18 RX ORDER — FAMOTIDINE 20 MG/1
20 TABLET, FILM COATED ORAL 2 TIMES DAILY
Qty: 30 TABLET | Refills: 0 | Status: SHIPPED | OUTPATIENT
Start: 2020-08-18 | End: 2020-09-02 | Stop reason: HOSPADM

## 2020-08-18 RX ORDER — ONDANSETRON 4 MG/1
4 TABLET, ORALLY DISINTEGRATING ORAL EVERY 6 HOURS PRN
Qty: 12 TABLET | Refills: 0 | Status: ON HOLD | OUTPATIENT
Start: 2020-08-18 | End: 2020-08-31 | Stop reason: SDUPTHER

## 2020-08-18 RX ORDER — SUCRALFATE 1 G/1
1 TABLET ORAL 4 TIMES DAILY
Qty: 60 TABLET | Refills: 0 | Status: ON HOLD | OUTPATIENT
Start: 2020-08-18 | End: 2020-08-31 | Stop reason: SDUPTHER

## 2020-08-18 RX ORDER — MAGNESIUM HYDROXIDE/ALUMINUM HYDROXICE/SIMETHICONE 120; 1200; 1200 MG/30ML; MG/30ML; MG/30ML
30 SUSPENSION ORAL ONCE
Status: COMPLETED | OUTPATIENT
Start: 2020-08-18 | End: 2020-08-18

## 2020-08-18 RX ADMIN — LIDOCAINE HYDROCHLORIDE 15 ML: 20 SOLUTION ORAL; TOPICAL at 22:46

## 2020-08-18 RX ADMIN — FAMOTIDINE 20 MG: 10 INJECTION, SOLUTION INTRAVENOUS at 20:56

## 2020-08-18 RX ADMIN — ALUMINUM HYDROXIDE, MAGNESIUM HYDROXIDE, AND SIMETHICONE 30 ML: 200; 200; 20 SUSPENSION ORAL at 22:46

## 2020-08-18 RX ADMIN — SODIUM CHLORIDE 1000 ML: 0.9 INJECTION, SOLUTION INTRAVENOUS at 20:47

## 2020-08-18 RX ADMIN — IOHEXOL 94 ML: 350 INJECTION, SOLUTION INTRAVENOUS at 21:30

## 2020-08-18 RX ADMIN — PANTOPRAZOLE SODIUM 40 MG: 40 INJECTION, POWDER, FOR SOLUTION INTRAVENOUS at 20:51

## 2020-08-18 RX ADMIN — LORAZEPAM 1 MG: 2 INJECTION INTRAMUSCULAR; INTRAVENOUS at 22:56

## 2020-08-18 NOTE — LETTER
[address] [place] [address] 190  [address] [facility] [address]  Dept: [phone]                                                                EMTALA TRANSFER CONSENT    NAME Nicholaus Rinne                 1950                              MRN [id number]    I have been informed of my rights regarding examination, treatment, and transfer   by Dr Anel Azul DO    Benefits: Other benefits (Include comment)_______________________(Inpatient Psych Tx (201))    Risks: Potential for delay in receiving treatment    Consent for Transfer:  I acknowledge that my medical condition has been evaluated and explained to me by the emergency department physician or other qualified medical person and/or my attending physician, who has recommended that I be transferred to the service of  Accepting Physician: Dr Matthew Vann at [address] [facility] Name, [facility] & State : [facility]  OA   jaysa György [address]  The above potential benefits of such transfer, the potential risks associated with such transfer, and the probable risks of not being transferred have been explained to me, and I fully understand them  The doctor has explained that, in my case, the benefits of transfer outweigh the risks  I agree to be transferred  I authorize the performance of emergency medical procedures and treatments upon me in both transit and upon arrival at the receiving facility  Additionally, I authorize the release of any and all medical records to the receiving facility and request they be transported with me, if possible  I understand that the safest mode of transportation during a medical emergency is an ambulance and that the Hospital advocates the use of this mode of transport   Risks of traveling to the receiving facility by car, including absence of medical control, life sustaining equipment, such as oxygen, and medical personnel has been explained to me and I fully understand them     ([facility])  [X]  I consent to the stated transfer and to be transported by ambulance/helicopter  [  ]  I consent to the stated transfer, but refuse transportation by ambulance and accept full responsibility for my transportation by car  I understand the risks of non-ambulance transfers and I exonerate the Hospital and its staff from any deterioration in my condition that results from this refusal     X___________________________________________    DATE  20  TIME________  Signature of patient or legally responsible individual signing on patient behalf           RELATIONSHIP TO PATIENT_________________________                          Provider Certification    NAME Sandro Fairchild                      1950                              MRN [id number]    A medical screening exam was performed on the above named patient  Based on the examination:    Condition Necessitating Transfer The primary encounter diagnosis was Epigastric pain  A diagnosis of Nausea was also pertinent to this visit      Patient Condition: The patient has been stabilized such that within reasonable medical probability, no material deterioration of the patient condition or the condition of the unborn child(rhonda) is likely to result from the transfer    Reason for Transfer: Level of Care needed not available at this facility    Transfer Requirements: Facility [facility]  OA   [address], [place]   · Space available and qualified personnel available for treatment as acknowledged by TONNY Zhou  · Agreed to accept transfer and to provide appropriate medical treatment as acknowledged by       Dr Gagandeep Fagan  · Appropriate medical records of the examination and treatment of the patient are provided at the time of transfer   [address], [address] ___JG____  · Transfer will be performed by qualified personnel from _____________________  and appropriate transfer equipment as required, including the use of necessary and appropriate life support measures  Provider Certification: I have examined the patient and explained the following risks and benefits of being transferred/refusing transfer to the patient/family:  The patient is stable for psychiatric transfer because they are medically stable, and is protected from harming him/herself or others during transport      Based on these reasonable risks and benefits to the patient and/or the unborn child(rhonda), and based upon the information available at the time of the patients examination, I certify that the medical benefits reasonably to be expected from the provision of appropriate medical treatments at another medical facility outweigh the increasing risks, if any, to the individuals medical condition, and in the case of labor to the unborn child, from effecting the transfer      X____________________________________________ DATE 08/20/20        TIME_______      ORIGINAL - SEND TO MEDICAL RECORDS   COPY - SEND WITH PATIENT DURING TRANSFER

## 2020-08-19 LAB
AMPHETAMINES SERPL QL SCN: NEGATIVE
BACTERIA UR QL AUTO: NORMAL /HPF
BARBITURATES UR QL: NEGATIVE
BENZODIAZ UR QL: NEGATIVE
BILIRUB UR QL STRIP: NEGATIVE
CLARITY UR: CLEAR
COCAINE UR QL: NEGATIVE
COLOR UR: YELLOW
GLUCOSE UR STRIP-MCNC: NEGATIVE MG/DL
HGB UR QL STRIP.AUTO: ABNORMAL
KETONES UR STRIP-MCNC: NEGATIVE MG/DL
LEUKOCYTE ESTERASE UR QL STRIP: NEGATIVE
METHADONE UR QL: POSITIVE
NITRITE UR QL STRIP: NEGATIVE
NON-SQ EPI CELLS URNS QL MICRO: NORMAL /HPF
OPIATES UR QL SCN: NEGATIVE
OXYCODONE+OXYMORPHONE UR QL SCN: NEGATIVE
PCP UR QL: NEGATIVE
PH UR STRIP.AUTO: 6 [PH]
PROT UR STRIP-MCNC: NEGATIVE MG/DL
RBC #/AREA URNS AUTO: NORMAL /HPF
SARS-COV-2 RNA RESP QL NAA+PROBE: NEGATIVE
SP GR UR STRIP.AUTO: <=1.005 (ref 1–1.03)
T4 FREE SERPL-MCNC: 1.16 NG/DL (ref 0.76–1.46)
THC UR QL: POSITIVE
TSH SERPL DL<=0.05 MIU/L-ACNC: 3.89 UIU/ML (ref 0.36–3.74)
UROBILINOGEN UR QL STRIP.AUTO: 0.2 E.U./DL
WBC #/AREA URNS AUTO: NORMAL /HPF

## 2020-08-19 PROCEDURE — 81001 URINALYSIS AUTO W/SCOPE: CPT | Performed by: EMERGENCY MEDICINE

## 2020-08-19 PROCEDURE — 87635 SARS-COV-2 COVID-19 AMP PRB: CPT | Performed by: EMERGENCY MEDICINE

## 2020-08-19 PROCEDURE — 80307 DRUG TEST PRSMV CHEM ANLYZR: CPT | Performed by: EMERGENCY MEDICINE

## 2020-08-19 RX ADMIN — METHADONE HYDROCHLORIDE 85 MG: 10 TABLET ORAL at 10:32

## 2020-08-19 NOTE — ED PROVIDER NOTES
Pt Name: Melanie Dyer  MRN: [id number]  [place] 1950  Age/Sex: 78 y o  male  Date of evaluation: 8/18/2020  PCP: No primary care provider on file  CHIEF COMPLAINT    Chief Complaint   Patient presents with    Abdominal Pain     pt brought by ems; he reports abdominal pain for 3 days; pt reports nausea with dry heaves; pt reports loss of appetite with sweats and chills         HPI    78 y o  male presenting with abdominal pain nausea also appetite, and occasional sweats and chills  Patient states these symptoms have been coming on off for the past several weeks with current episode lasting for approximately 3 days  Pain is sharp, in the left upper quadrant and epigastric area, worse with eating and better rest   He thinks at least part of this may be due to anxiety regarding his methadone but is concerned that something else may be going on  He denies fevers, trauma, chest pain, shortness of breath, other symptoms  HPI      Past Medical and Surgical History    Past Medical History:   Diagnosis Date    Addiction to drug ([address] )     Asthma        Past Surgical History:   Procedure Laterality Date    ANKLE SURGERY      KNEE SURGERY      RIB FRACTURE SURGERY         History reviewed  No pertinent family history  Social History     Tobacco Use    Smoking status: Former Smoker    Smokeless tobacco: Never Used   Substance Use Topics    Alcohol use: Not Currently    Drug use: Yes     Types: Marijuana     Comment: Pt denies use of substances although ED UDS resulted +THC           Allergies    Allergies   Allergen Reactions    Aspirin        Home Medications    Prior to Admission medications    Medication Sig Start Date End Date Taking?  Authorizing Provider   albuterol (PROVENTIL HFA,VENTOLIN HFA) 90 mcg/act inhaler Inhale 2 puffs every 4 (four) hours as needed for wheezing 6/16/20   Ashley Gonzalez PA-C   gabapentin (NEURONTIN) 100 mg capsule TAKE 1 CAPSULE BY MOUTH THREE TIMES A DAY 8/12/20 Gumaro Larios MD   mirtazapine (REMERON) 30 mg tablet TAKE 1 TABLET (30 MG TOTAL) BY MOUTH DAILY AT BEDTIME 8/11/20   Gumaro Larios MD           Review of Systems    Review of Systems   Constitutional: Positive for chills  Negative for appetite change and diaphoresis  HENT: Negative for drooling, facial swelling, trouble swallowing and voice change  Respiratory: Negative for apnea, shortness of breath and wheezing  Cardiovascular: Negative for chest pain and leg swelling  Gastrointestinal: Positive for abdominal pain, nausea and vomiting  Negative for abdominal distention and diarrhea  Genitourinary: Negative for dysuria and urgency  Musculoskeletal: Negative for arthralgias, back pain, gait problem and neck pain  Skin: Negative for color change, rash and wound  Neurological: Negative for seizures, speech difficulty, weakness and headaches  Psychiatric/Behavioral: Negative for agitation, behavioral problems and dysphoric mood  The patient is not nervous/anxious  All other systems reviewed and negative  Physical Exam      ED Triage Vitals [08/18/20 2024]   Temperature Pulse Respirations Blood Pressure SpO2   98 2 °F (36 8 °C) 77 20 159/77 99 %      Temp Source Heart Rate Source Patient Position - Orthostatic VS BP Location FiO2 (%)   Oral Monitor Lying Right arm --      Pain Score       Worst Possible Pain               Physical Exam  Vitals signs and nursing note reviewed  Constitutional:       Appearance: He is well-developed  HENT:      Head: Normocephalic and atraumatic  Eyes:      Conjunctiva/sclera: Conjunctivae normal       Pupils: Pupils are equal, round, and reactive to light  Neck:      Musculoskeletal: Normal range of motion and neck supple  Trachea: No tracheal deviation  Cardiovascular:      Rate and Rhythm: Normal rate and regular rhythm  Heart sounds: Normal heart sounds  No murmur     Pulmonary:      Effort: Pulmonary effort is normal  No respiratory distress  Breath sounds: Normal breath sounds  No stridor  No wheezing or rales  Abdominal:      General: There is no distension  Palpations: Abdomen is soft  Tenderness: There is abdominal tenderness  There is no guarding or rebound  Comments: Tender to palpation left upper quadrant and epigastric area, no rebound or guarding, no distension  Musculoskeletal: Normal range of motion  General: No deformity  Skin:     General: Skin is warm and dry  Findings: No rash  Neurological:      Mental Status: He is alert and oriented to person, place, and time  Psychiatric:         Behavior: Behavior normal          Thought Content: Thought content normal          Judgment: Judgment normal               Diagnostic Results  EKG Interpretation    Rate:  75  BPM  Rhythm:  Normal Sinus Rhythm   Axis:  Normal   Intervals: Normal, no blocks, QTc  455 ms  Q waves:  No pathologic Q waves   T waves:  Normal   ST segments:  No significant elevations or depressions     Impression:  Normal sinus rhythm without evidence of acute ischemia or significant arrhythmia      EKG for comparison:  EKG dated 8 June 2020 similar in character with no major changes    EKG interpreted by me         Labs:    Results Reviewed     Procedure Component Value Units Date/Time    Troponin I [[id number]]  (Normal) Collected:  08/18/20 2045    Lab Status:  Final result Specimen:  Blood from Arm, Left Updated:  08/18/20 2114     Troponin I <0 02 ng/mL     Lipase [[id number]]  (Normal) Collected:  08/18/20 2045    Lab Status:  Final result Specimen:  Blood from Arm, Left Updated:  08/18/20 2108     Lipase 120 u/L     Hepatic function panel [[id number]]  (Normal) Collected:  08/18/20 2045    Lab Status:  Final result Specimen:  Blood from Arm, Left Updated:  08/18/20 2108     Total Bilirubin 0 50 mg/dL      Bilirubin, Direct 0 14 mg/dL      Alkaline Phosphatase 72 U/L      AST 40 U/L      ALT 54 U/L      Total Protein 7 0 g/dL Albumin 3 6 g/dL     Basic metabolic panel [[id number]] Collected:  08/18/20 2045    Lab Status:  Final result Specimen:  Blood from Arm, Left Updated:  08/18/20 2107     Sodium 143 mmol/L      Potassium 3 9 mmol/L      Chloride 107 mmol/L      CO2 27 mmol/L      ANION GAP 9 mmol/L      BUN 15 mg/dL      Creatinine 1 24 mg/dL      Glucose 92 mg/dL      Calcium 8 9 mg/dL      eGFR 59 ml/min/1 73sq m     Narrative:       [place] guidelines for Chronic Kidney Disease (CKD):     Stage 1 with normal or high GFR (GFR > 90 mL/min/1 73 square meters)    Stage 2 Mild CKD (GFR = 60-89 mL/min/1 73 square meters)    Stage 3A Moderate CKD (GFR = 45-59 mL/min/1 73 square meters)    Stage 3B Moderate CKD (GFR = 30-44 mL/min/1 73 square meters)    Stage 4 Severe CKD (GFR = 15-29 mL/min/1 73 square meters)    Stage 5 End Stage CKD (GFR <15 mL/min/1 73 square meters)  Note: GFR calculation is accurate only with a steady state creatinine    CBC and differential [[id number]]  (Abnormal) Collected:  08/18/20 2045    Lab Status:  Final result Specimen:  Blood from Arm, Left Updated:  08/18/20 2052     WBC 8 21 Thousand/uL      RBC 4 58 Million/uL      Hemoglobin 14 9 g/dL      Hematocrit 44 5 %      MCV 97 fL      MCH 32 5 pg      MCHC 33 5 g/dL      RDW 13 2 %      MPV 8 4 fL      Platelets 337 Thousands/uL      nRBC 0 /100 WBCs      Neutrophils Relative 55 %      Immat GRANS % 0 %      Lymphocytes Relative 31 %      Monocytes Relative 11 %      Eosinophils Relative 2 %      Basophils Relative 1 %      Neutrophils Absolute 4 51 Thousands/µL      Immature Grans Absolute 0 02 Thousand/uL      Lymphocytes Absolute 2 53 Thousands/µL      Monocytes Absolute 0 91 Thousand/µL      Eosinophils Absolute 0 17 Thousand/µL      Basophils Absolute 0 07 Thousands/µL           All labs reviewed and utilized in the medical decision making process    Radiology:    CT abdomen pelvis with contrast   Final Result Limited evaluation of the stomach  Difficult to exclude gastritis/gastric pathology in this setting  Otherwise, no acute intra-abdominal abnormality  2 mm nonobstructing left renal calculus  Incidental findings as above  Workstation performed: [id number]             All radiology studies independently viewed by me and interpreted by the radiologist     Procedure    Procedures        ED Course of Care and Re-Assessments      Symptoms initially resolved Pepcid and Protonix, recurred patient was given GI cocktail  Medications   aluminum-magnesium hydroxide-simethicone (MYLANTA) 200-200-20 mg/5 mL oral suspension 30 mL (has no administration in time range)   Lidocaine Viscous HCl (XYLOCAINE) 2 % mucosal solution 15 mL (has no administration in time range)   sodium chloride 0 9 % bolus 1,000 mL (0 mL Intravenous Stopped 8/18/20 2210)   famotidine (PEPCID) injection 20 mg (20 mg Intravenous Given 8/18/20 2056)   pantoprazole (PROTONIX) injection 40 mg (40 mg Intravenous Given 8/18/20 2051)   iohexol (OMNIPAQUE) 350 MG/ML injection (MULTI-DOSE) 100 mL (94 mL Intravenous Given 8/18/20 2130)           FINAL IMPRESSION    Final diagnoses:   Epigastric pain   Nausea         DISPOSITION/PLAN    Epigastric pain and nausea as above  Vital signs examination overall reassuring, lab workup likewise reassuring EKG nonischemic  Very low suspicion ACS PE sepsis meningitis bowel obstruction appendicitis cholecystitis, acute life threat  Based on chronicity of symptoms as well as character, some concern for gastritis or ulcer, started on antacids, referred to Gastroenterology, discharged strict return precautions, follow up same  After patient was discharged, as nurse was this connecting the monitor, patient suddenly stated that he does not feel safe at home  I was called to the room, and we had a conversation regarding this statement  He stated that he"just wants to die" has for some time    He notes significant anxiety and depression over the past several months, also notes he still struggling with the death of his wife as well as the recent death of his dog  He denies active suicidal plans but is concerned due to worsening suicidal thoughts  Crisis consulted, but no crisis worker available at that time  Patient also has notable risk factor prior suicide attempt via overdose  Based on this, patient was felt to be unsafe for discharge home as previously planned  Patient signed out to Dr Flor Ponce at time of shift change pending crisis evaluation in the morning  Time reflects when diagnosis was documented in both MDM as applicable and the Disposition within this note     Time User Action Codes Description Comment    8/18/2020 10:30 PM Ruthiris Caldwell Add [R10 13] Epigastric pain     8/18/2020 10:30 PM Dailey Joe STEEN Add [R11 0] Nausea       ED Disposition     ED Disposition Condition Date/Time Comment    Discharge Stable Tue Aug 18, 2020 10:30 PM Evelyn Lopez discharge to home/self care  Follow-up Information     Follow up With Specialties Details Why Contact Info Additional [address] [facility] Emergency Department Emergency Medicine Go to  If symptoms worsen [address] [facility] [address]olin[address] ED, [address] [place], [place], [address]  [facility] Gastroenterology Specialists [facility] Gastroenterology Call in 1 day To discuss your symptoms and further care    Consider further workup to check for gastritis or an ulcer [address] [facility] Gastroenterology Specialists [facility], [address], [place], [place], [place], [address] [facility]            PATIENT REFERRED TO:    [address] [facility] Emergency Department  [address] [facility] [address] [phone]  Go to   If symptoms worsen    [facility] Gastroenterology Specialists [place] [address] [address] [address] [phone]  Call in 1 day  To discuss your symptoms and further care  Consider further workup to check for gastritis or an ulcer      DISCHARGE MEDICATIONS:    Patient's Medications   Discharge Prescriptions    FAMOTIDINE (PEPCID) 20 MG TABLET    Take 1 tablet (20 mg total) by mouth 2 (two) times a day       Start Date: 8/18/2020 End Date: --       Order Dose: 20 mg       Quantity: 30 tablet    Refills: 0    ONDANSETRON (ZOFRAN-ODT) 4 MG DISINTEGRATING TABLET    Take 1 tablet (4 mg total) by mouth every 6 (six) hours as needed for nausea or vomiting       Start Date: 8/18/2020 End Date: --       Order Dose: 4 mg       Quantity: 12 tablet    Refills: 0    SUCRALFATE (CARAFATE) 1 G TABLET    Take 1 tablet (1 g total) by mouth 4 (four) times a day       Start Date: 8/18/2020 End Date: --       Order Dose: 1 g       Quantity: 60 tablet    Refills: 0       No discharge procedures on file           MD Norma Mansfield MD  08/18/20 7662       Norma Pryor MD  08/18/20 1879

## 2020-08-19 NOTE — ED NOTES
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 after several work accidents and a back surgery  Patient reports a previous admission at [facility] in, approximately 2 months ago, admitting that he lied about taking his methadone in an attempt to commit suicide, in order to get help  Patient reports no prior suicide attempts and denies any homicidal ideation or violence towards others in the past   Patient currently resides alone, and denies having much of a support system  Patient does indicate financial stressors have been difficult on him  Patient also states that transportation is a barrier, and then after his last admission, he was unable to attend outpatient treatment due to the location  Patient also does not have a license  Patient reports smoking marijuana at times, but denies having money for it often  Patient denies any auditory hallucinations or visual hallucinations, and there are no signs of psychosis present at this time  Patient complains of poor sleep and appetite, indicating has not slept in 3 days  Patient reports losing 70 lb in the past year  Discussed options with the patient who is requesting to sign in to inpatient treatment at this time  201 prepared  Chief Complaint   Patient presents with    Abdominal Pain     pt brought by ems; he reports abdominal pain for 3 days; pt reports nausea with dry heaves; pt reports loss of appetite with sweats and chills     Intake Assessment completed, Safety Risk Assessment completed      Samreen Sahu LMSW  08/19/20  6501

## 2020-08-19 NOTE — ED NOTES
Ordered pt breakfast    Scrambled eggs with american cheese, rye bread, pears and 2 ginger ales        Alexandru Saltness  08/19/20 9163

## 2020-08-19 NOTE — ED NOTES
Per Leland Nicole in [facility] Intake, they currently do not have any in network beds  Spoke with patient who is aware of no in network beds at this time  Patient is requesting to stay fairly close to our facility, although is open to us conducting a bed search within an hour away  Per Colten Rodrigues at [facility], they only have a female OA bed available  Per Adam Grey at [facility], they currently do not have any geriatric beds  Call placed to [address] [place] [address], message left with Yarely Li in the admissions office  Call to [facility], spoke with Director, Radha Marti, who indicated they are currently full at this time       Connie Pod, LMSW  08/19/20  6456

## 2020-08-19 NOTE — ED NOTES
201 sent to  Intake for review at [facility]       Matias Rios, [facility]  08/19/20  4818

## 2020-08-19 NOTE — ED NOTES
Pt belongings:  1 cellphone  1   1 pair of pants  1 pair of socks  1pair of boots  1 wallet  1 black jacket  1 jeans shirt  1 camoflauge hat  1 knife     Janie I González  08/18/20 3764       Janie I González  08/18/20 6618

## 2020-08-19 NOTE — ED NOTES
Spoke with Jackie Graff at [facility] who confirmed that the patient is a patient there, and they will be faxing over dosage information PACO Deleon LMSW  08/19/20  1000

## 2020-08-20 ENCOUNTER — HOSPITAL ENCOUNTER (INPATIENT)
Facility: HOSPITAL | Age: 70
LOS: 13 days | Discharge: HOME | DRG: 885 | End: 2020-09-02
Attending: PSYCHIATRY & NEUROLOGY | Admitting: PSYCHIATRY & NEUROLOGY
Payer: MEDICARE

## 2020-08-20 VITALS
OXYGEN SATURATION: 96 % | DIASTOLIC BLOOD PRESSURE: 54 MMHG | HEIGHT: 70 IN | WEIGHT: 189.38 LBS | SYSTOLIC BLOOD PRESSURE: 130 MMHG | TEMPERATURE: 98.4 F | HEART RATE: 62 BPM | BODY MASS INDEX: 27.11 KG/M2 | RESPIRATION RATE: 15 BRPM

## 2020-08-20 DIAGNOSIS — K59.00 CONSTIPATION: ICD-10-CM

## 2020-08-20 DIAGNOSIS — R10.13 EPIGASTRIC PAIN: ICD-10-CM

## 2020-08-20 DIAGNOSIS — F11.10 OPIATE ABUSE, CONTINUOUS (HCC): ICD-10-CM

## 2020-08-20 DIAGNOSIS — F33.2 SEVERE EPISODE OF RECURRENT MAJOR DEPRESSIVE DISORDER, WITHOUT PSYCHOTIC FEATURES (HCC): Primary | ICD-10-CM

## 2020-08-20 DIAGNOSIS — R11.0 NAUSEA: ICD-10-CM

## 2020-08-20 DIAGNOSIS — K21.9 GERD (GASTROESOPHAGEAL REFLUX DISEASE): ICD-10-CM

## 2020-08-20 DIAGNOSIS — R11.2 NAUSEA AND VOMITING: ICD-10-CM

## 2020-08-20 DIAGNOSIS — J45.909 ASTHMA: ICD-10-CM

## 2020-08-20 DIAGNOSIS — E55.9 VITAMIN D DEFICIENCY: ICD-10-CM

## 2020-08-20 DIAGNOSIS — G89.4 CHRONIC PAIN SYNDROME: ICD-10-CM

## 2020-08-20 DIAGNOSIS — L25.9 CONTACT DERMATITIS: ICD-10-CM

## 2020-08-20 DIAGNOSIS — F32.9 MAJOR DEPRESSIVE DISORDER WITHOUT PSYCHOTIC FEATURES: ICD-10-CM

## 2020-08-20 DIAGNOSIS — R10.10 UPPER ABDOMINAL PAIN, UNSPECIFIED: ICD-10-CM

## 2020-08-20 PROCEDURE — 82306 VITAMIN D 25 HYDROXY: CPT | Performed by: FAMILY MEDICINE

## 2020-08-20 PROCEDURE — 82607 VITAMIN B-12: CPT | Performed by: FAMILY MEDICINE

## 2020-08-20 PROCEDURE — 82746 ASSAY OF FOLIC ACID SERUM: CPT | Performed by: FAMILY MEDICINE

## 2020-08-20 RX ORDER — OLANZAPINE 10 MG/1
5 INJECTION, POWDER, LYOPHILIZED, FOR SOLUTION INTRAMUSCULAR EVERY 6 HOURS PRN
Status: DISCONTINUED | OUTPATIENT
Start: 2020-08-20 | End: 2020-09-02 | Stop reason: HOSPADM

## 2020-08-20 RX ORDER — HYDROXYZINE HYDROCHLORIDE 25 MG/1
25 TABLET, FILM COATED ORAL EVERY 4 HOURS PRN
Status: DISCONTINUED | OUTPATIENT
Start: 2020-08-20 | End: 2020-09-02 | Stop reason: HOSPADM

## 2020-08-20 RX ORDER — ACETAMINOPHEN 325 MG/1
975 TABLET ORAL EVERY 6 HOURS PRN
Status: DISCONTINUED | OUTPATIENT
Start: 2020-08-20 | End: 2020-09-02 | Stop reason: HOSPADM

## 2020-08-20 RX ORDER — ACETAMINOPHEN 325 MG/1
650 TABLET ORAL EVERY 6 HOURS PRN
Status: CANCELLED | OUTPATIENT
Start: 2020-08-20

## 2020-08-20 RX ORDER — POLYETHYLENE GLYCOL 3350 17 G/17G
17 POWDER, FOR SOLUTION ORAL DAILY PRN
Status: DISCONTINUED | OUTPATIENT
Start: 2020-08-20 | End: 2020-09-02 | Stop reason: HOSPADM

## 2020-08-20 RX ORDER — HYDROXYZINE HYDROCHLORIDE 25 MG/1
25 TABLET, FILM COATED ORAL EVERY 4 HOURS PRN
Status: CANCELLED | OUTPATIENT
Start: 2020-08-20

## 2020-08-20 RX ORDER — MAGNESIUM HYDROXIDE/ALUMINUM HYDROXICE/SIMETHICONE 120; 1200; 1200 MG/30ML; MG/30ML; MG/30ML
30 SUSPENSION ORAL EVERY 4 HOURS PRN
Status: DISCONTINUED | OUTPATIENT
Start: 2020-08-20 | End: 2020-09-02 | Stop reason: HOSPADM

## 2020-08-20 RX ORDER — OLANZAPINE 10 MG/1
5 INJECTION, POWDER, LYOPHILIZED, FOR SOLUTION INTRAMUSCULAR EVERY 6 HOURS PRN
Status: CANCELLED | OUTPATIENT
Start: 2020-08-20

## 2020-08-20 RX ORDER — ACETAMINOPHEN 325 MG/1
650 TABLET ORAL EVERY 6 HOURS PRN
Status: DISCONTINUED | OUTPATIENT
Start: 2020-08-20 | End: 2020-09-02 | Stop reason: HOSPADM

## 2020-08-20 RX ORDER — HYDROXYZINE HYDROCHLORIDE 25 MG/1
50 TABLET, FILM COATED ORAL EVERY 6 HOURS PRN
Status: DISCONTINUED | OUTPATIENT
Start: 2020-08-20 | End: 2020-09-02 | Stop reason: HOSPADM

## 2020-08-20 RX ORDER — AMOXICILLIN 250 MG
1 CAPSULE ORAL DAILY PRN
Status: CANCELLED | OUTPATIENT
Start: 2020-08-20

## 2020-08-20 RX ORDER — ACETAMINOPHEN 325 MG/1
975 TABLET ORAL EVERY 6 HOURS PRN
Status: CANCELLED | OUTPATIENT
Start: 2020-08-20

## 2020-08-20 RX ORDER — ACETAMINOPHEN 325 MG/1
650 TABLET ORAL EVERY 4 HOURS PRN
Status: DISCONTINUED | OUTPATIENT
Start: 2020-08-20 | End: 2020-09-02 | Stop reason: HOSPADM

## 2020-08-20 RX ORDER — ONDANSETRON 4 MG/1
4 TABLET, ORALLY DISINTEGRATING ORAL EVERY 6 HOURS PRN
Status: DISCONTINUED | OUTPATIENT
Start: 2020-08-20 | End: 2020-09-02 | Stop reason: HOSPADM

## 2020-08-20 RX ORDER — TRAZODONE HYDROCHLORIDE 50 MG/1
50 TABLET ORAL
Status: CANCELLED | OUTPATIENT
Start: 2020-08-20

## 2020-08-20 RX ORDER — ALBUTEROL SULFATE 90 UG/1
2 AEROSOL, METERED RESPIRATORY (INHALATION) EVERY 4 HOURS PRN
Status: DISCONTINUED | OUTPATIENT
Start: 2020-08-20 | End: 2020-09-02 | Stop reason: HOSPADM

## 2020-08-20 RX ORDER — POLYETHYLENE GLYCOL 3350 17 G/17G
17 POWDER, FOR SOLUTION ORAL DAILY PRN
Status: CANCELLED | OUTPATIENT
Start: 2020-08-20

## 2020-08-20 RX ORDER — OLANZAPINE 5 MG/1
5 TABLET ORAL EVERY 8 HOURS PRN
Status: DISCONTINUED | OUTPATIENT
Start: 2020-08-20 | End: 2020-09-02 | Stop reason: HOSPADM

## 2020-08-20 RX ORDER — ACETAMINOPHEN 325 MG/1
650 TABLET ORAL EVERY 4 HOURS PRN
Status: CANCELLED | OUTPATIENT
Start: 2020-08-20

## 2020-08-20 RX ORDER — AMOXICILLIN 250 MG
1 CAPSULE ORAL DAILY PRN
Status: DISCONTINUED | OUTPATIENT
Start: 2020-08-20 | End: 2020-09-02 | Stop reason: HOSPADM

## 2020-08-20 RX ORDER — FAMOTIDINE 20 MG/1
20 TABLET, FILM COATED ORAL 2 TIMES DAILY
Status: DISCONTINUED | OUTPATIENT
Start: 2020-08-20 | End: 2020-08-21

## 2020-08-20 RX ORDER — METHADONE HYDROCHLORIDE 10 MG/5ML
84 SOLUTION ORAL DAILY
Status: ON HOLD | COMMUNITY
End: 2020-08-31 | Stop reason: SDUPTHER

## 2020-08-20 RX ORDER — TRAZODONE HYDROCHLORIDE 50 MG/1
50 TABLET ORAL
Status: DISCONTINUED | OUTPATIENT
Start: 2020-08-20 | End: 2020-09-02 | Stop reason: HOSPADM

## 2020-08-20 RX ORDER — ONDANSETRON 4 MG/1
4 TABLET, ORALLY DISINTEGRATING ORAL EVERY 6 HOURS PRN
Status: CANCELLED | OUTPATIENT
Start: 2020-08-20

## 2020-08-20 RX ORDER — ONDANSETRON 4 MG/1
4 TABLET, ORALLY DISINTEGRATING ORAL EVERY 6 HOURS PRN
Status: DISCONTINUED | OUTPATIENT
Start: 2020-08-20 | End: 2020-08-20

## 2020-08-20 RX ORDER — MAGNESIUM HYDROXIDE/ALUMINUM HYDROXICE/SIMETHICONE 120; 1200; 1200 MG/30ML; MG/30ML; MG/30ML
30 SUSPENSION ORAL EVERY 4 HOURS PRN
Status: CANCELLED | OUTPATIENT
Start: 2020-08-20

## 2020-08-20 RX ORDER — OLANZAPINE 2.5 MG/1
5 TABLET ORAL EVERY 8 HOURS PRN
Status: CANCELLED | OUTPATIENT
Start: 2020-08-20

## 2020-08-20 RX ORDER — HYDROXYZINE HYDROCHLORIDE 25 MG/1
50 TABLET, FILM COATED ORAL EVERY 6 HOURS PRN
Status: CANCELLED | OUTPATIENT
Start: 2020-08-20

## 2020-08-20 RX ORDER — SUCRALFATE 1 G/1
1 TABLET ORAL 4 TIMES DAILY
Status: DISCONTINUED | OUTPATIENT
Start: 2020-08-20 | End: 2020-09-02 | Stop reason: HOSPADM

## 2020-08-20 RX ADMIN — HYDROXYZINE HYDROCHLORIDE 50 MG: 25 TABLET, FILM COATED ORAL at 21:59

## 2020-08-20 RX ADMIN — SUCRALFATE 1 G: 1 TABLET ORAL at 21:22

## 2020-08-20 RX ADMIN — METHADONE HYDROCHLORIDE 85 MG: 10 TABLET ORAL at 08:31

## 2020-08-20 RX ADMIN — HYDROXYZINE HYDROCHLORIDE 50 MG: 25 TABLET, FILM COATED ORAL at 15:45

## 2020-08-20 RX ADMIN — SUCRALFATE 1 G: 1 TABLET ORAL at 17:17

## 2020-08-20 RX ADMIN — FAMOTIDINE 20 MG: 20 TABLET, FILM COATED ORAL at 17:17

## 2020-08-20 NOTE — H&P
Prashanth Trammell  CGT:[id number] Jared Ling  GZB:[id number]    U:[id number]  Adm Date: 8/20/2020 1457  2:57 PM   ATT PHY: Hoda Tam, [address]         Chief Complaint:  Worsening anxiety and depression symptoms  History of Presenting Illness: Matthew Deshpande is a(n) 78y o  year old male who was admitted to [facility] Emergency Department where he presented with worsening depression and anxiety symptoms  Patient was having suicidal ideations without any definitive plan  Patient examined at bedside  Patient denied any active suicidal homicidal ideations      Allergies   Allergen Reactions    Aspirin        Current Facility-Administered Medications on File Prior to Encounter   Medication Dose Route Frequency Provider Last Rate Last Dose    [DISCONTINUED] methadone (DOLOPHINE) tablet 85 mg  85 mg Oral Daily Hellen Moore, DO   85 mg at 08/20/20 9857     Current Outpatient Medications on File Prior to Encounter   Medication Sig Dispense Refill    albuterol (PROVENTIL HFA,VENTOLIN HFA) 90 mcg/act inhaler Inhale 2 puffs every 4 (four) hours as needed for wheezing (Patient not taking: Reported on 8/18/2020) 1 Inhaler 0    famotidine (PEPCID) 20 mg tablet Take 1 tablet (20 mg total) by mouth 2 (two) times a day 30 tablet 0    gabapentin (NEURONTIN) 100 mg capsule TAKE 1 CAPSULE BY MOUTH THREE TIMES A DAY 90 capsule 1    mirtazapine (REMERON) 30 mg tablet TAKE 1 TABLET (30 MG TOTAL) BY MOUTH DAILY AT BEDTIME (Patient not taking: Reported on 8/18/2020) 30 tablet 1    ondansetron (ZOFRAN-ODT) 4 mg disintegrating tablet Take 1 tablet (4 mg total) by mouth every 6 (six) hours as needed for nausea or vomiting 12 tablet 0    sucralfate (CARAFATE) 1 g tablet Take 1 tablet (1 g total) by mouth 4 (four) times a day 60 tablet 0       Active Ambulatory Problems     Diagnosis Date Noted    Intentional methadone overdose ([facility] ) 06/05/2020    H/O opioid abuse ([facility] ) 06/08/2020    Asthma 06/08/2020    Chronic pain 06/08/2020     Resolved Ambulatory Problems     Diagnosis Date Noted    Methadone withdrawal ([facility] ) 06/05/2020    Major depressive disorder without psychotic features 06/07/2020     Past Medical History:   Diagnosis Date    Addiction to drug [facility])        Past Surgical History:   Procedure Laterality Date    ANKLE SURGERY      KNEE SURGERY      RIB FRACTURE SURGERY         Social History:   Social History     Socioeconomic History    Marital status: Single     Spouse name: None    Number of children: None    Years of education: None    Highest education level: None   Occupational History    None   Social Needs    Financial resource strain: None    Food insecurity     Worry: None     Inability: None    Transportation needs     Medical: None     Non-medical: None   Tobacco Use    Smoking status: Former Smoker    Smokeless tobacco: Never Used   Substance and Sexual Activity    Alcohol use: Not Currently    Drug use: Yes     Types: Marijuana     Comment: Pt denies use of substances although ED UDS resulted +THC    Sexual activity: Not Currently     Partners: Female   Lifestyle    Physical activity     Days per week: None     Minutes per session: None    Stress: None   Relationships    Social connections     Talks on phone: None     Gets together: None     Attends Taoism service: None     Active member of club or organization: None     Attends meetings of clubs or organizations: None     Relationship status: None    Intimate partner violence     Fear of current or ex partner: None     Emotionally abused: None     Physically abused: None     Forced sexual activity: None   Other Topics Concern    None   Social History Narrative    None       Family History: History reviewed  No pertinent family history  Review of Systems   Musculoskeletal: Positive for arthralgias, back pain and gait problem     Neurological: Positive for weakness  All other systems reviewed and are negative  Physical Exam   Constitutional: Awake and Alert  Well-developed and well-nourished  No distress  HENT: PERR,EOMI, conjunctiva normal  Head: Normocephalic and atraumatic  Mouth/Throat: Oropharynx is clear and moist     Eyes: Conjunctivae and EOM are normal  Pupils are equal, round, and reactive to light  Right and left eye exhibits no discharge  Neck: Neck supple  No tracheal deviation present  No thyromegaly present  Cardiovascular: Normal rate, regular rhythm and normal heart sounds  Exam reveals no friction rub  No murmur heard  Pulmonary/Chest: Effort normal and breath sounds normal  No respiratory distress  She has no wheezes  Abdominal: Soft  Bowel sounds are normal  She exhibits no distension  There is no tenderness  There is no rebound and no guarding  Neurological: Cranial Nerves grossly intact  No sensory deficit  Coordination normal    Musculoskeletal:   Nontender spine  Skin: Skin is warm and dry  No rash noted  No diaphoresis  No erythema  No edema  No cyanosis  Assessment     Josi Fernandez is a(n) 78y o  year old male with MDD    1  Asthma  Albuterol inhaler as needed  2  Chronic Pain Syndrome  Tylenol as needed for mild-moderate pains  Will restart Methadone liquid at 84mg once daily  3  Opiate Induced Constipation  Senokot-S PRN  4  Gait abnormality  Patient seems stable at this time  5  DJD/osteoarthritis  Patient may get Tylenol on as needed basis  6  Substance Abuse/MDD  Defer to psychiatry      Prognosis: Fair      Discharge Plan: In progress      Advanced Directives: I have discussed in detail the patient the advanced directives  The patient does not have a POA and does not have a living will  No first contact is listed   When discussing cardiac and pulmonary resuscitation efforts with the patient, the patient wishes to be a FULL CODE      I have spent more than 30 minutes gathering data, doing physical examination, and discussing the advanced directives, which was witnessed by caring staff

## 2020-08-20 NOTE — ED NOTES
Patient is accepted at [address] [facility],[address] [facility]  Patient is accepted by Dr Ivette Gupta per Jasmine Marques in Intake  Transportation is arranged with [facility] via [facility] at [facility]  Transportation is scheduled for 2pm       Nurse report is to be called to [phone] prior to patient transfer      Sanjana Amador LMSW  08/20/20  9240

## 2020-08-20 NOTE — PROGRESS NOTES
Patient admitted to [facility] from [facility] ED under 201  Patient initially presented with abdominal pain which he believes is from his severe anxiety  Patient reports he is unable to control his anxiety and occasionally has thoughts that he wish he were dead  He denies active SI and has no intent or plan to hurt himself  Agrees to come to staff if thoughts change  He appears somewhat restless and rates his anxiety 10/10  Rates his depression 7/10  States he has been compliant with medications at home but has run out earlier this week  No complaints of pain except for his stomach, but describes it as more of a burning discomfort  Patient was cooperative with admission process  He showered independently  Skin intact  Will continue monitoring

## 2020-08-20 NOTE — PROGRESS NOTES
Patient came with the followin pr of jeans  1 black t-shirt  1 jalen shirt with buttons  1 pr white socks  1 pr black underwear    Locked in the contraband:    1 belt (cowboy style)  1 black leather jacket  1 pr brown cowboy boots  1 camo baseball hat    Locked behind nurses:    1 cell phone w/      Locked in safe bag # [id number] & [id number]    1 long necklace metal with 2 rings on it  3 pocket knives  Key chain with watch with keys  1 medicare card  $6 00 in cash  $3 07 in change  2 drivers lic ( 1 [place], 1 [place])  1 visa card  1 black wallet w/chain        All items have been sign for by the patient

## 2020-08-20 NOTE — PLAN OF CARE
Problem: Ineffective Coping  Goal: Cooperates with admission process  Description: Interventions:   - Complete admission process  Outcome: Progressing  Goal: Identifies ineffective coping skills  Outcome: Progressing  Goal: Identifies healthy coping skills  Outcome: Progressing  Goal: Demonstrates healthy coping skills  Outcome: Progressing  Goal: Participates in unit activities  Description: Interventions:  - Provide therapeutic environment   - Provide required programming   - Redirect inappropriate behaviors   Outcome: Progressing  Goal: Patient/Family participate in treatment and DC plans  Description: Interventions:  - Provide therapeutic environment  Outcome: Progressing  Goal: Patient/Family verbalizes awareness of resources  Outcome: Progressing  Goal: Understands least restrictive measures  Description: Interventions:  - Utilize least restrictive behavior  Outcome: Progressing  Goal: Free from restraint events  Description: - Utilize least restrictive measures   - Provide behavioral interventions   - Redirect inappropriate behaviors   Outcome: Progressing     Problem: Risk for Self Injury/Neglect  Goal: Treatment Goal: Remain safe during length of stay, learn and adopt new coping skills, and be free of self-injurious ideation, impulses and acts at the time of discharge  Outcome: Progressing  Goal: Verbalize thoughts and feelings  Description: Interventions:  - Assess and re-assess patient's lethality and potential for self-injury  - Engage patient in 1:1 interactions, daily, for a minimum of 15 minutes  - Encourage patient to express feelings, fears, frustrations, hopes  - Establish rapport/trust with patient   Outcome: Progressing  Goal: Refrain from harming self  Description: Interventions:  - Monitor patient closely, per order  - Develop a trusting relationship  - Supervise medication ingestion, monitor effects and side effects   Outcome: Progressing  Goal: Attend and participate in unit activities, including therapeutic, recreational, and educational groups  Description: Interventions:  - Provide therapeutic and educational activities daily, encourage attendance and participation, and document same in the medical record  - Obtain collateral information, encourage visitation and family involvement in care   Outcome: Progressing  Goal: Recognize maladaptive responses and adopt new coping mechanisms  Outcome: Progressing  Goal: Complete daily ADLs, including personal hygiene independently, as able  Description: Interventions:  - Observe, teach, and assist patient with ADLS  - Monitor and promote a balance of rest/activity, with adequate nutrition and elimination  Outcome: Progressing     Problem: Depression  Goal: Treatment Goal: Demonstrate behavioral control of depressive symptoms, verbalize feelings of improved mood/affect, and adopt new coping skills prior to discharge  Outcome: Progressing  Goal: Verbalize thoughts and feelings  Description: Interventions:  - Assess and re-assess patient's level of risk   - Engage patient in 1:1 interactions, daily, for a minimum of 15 minutes   - Encourage patient to express feelings, fears, frustrations, hopes   Outcome: Progressing  Goal: Refrain from harming self  Description: Interventions:  - Monitor patient closely, per order   - Supervise medication ingestion, monitor effects and side effects   Outcome: Progressing  Goal: Refrain from isolation  Description: Interventions:  - Develop a trusting relationship   - Encourage socialization   Outcome: Progressing  Goal: Refrain from self-neglect  Outcome: Progressing  Goal: Attend and participate in unit activities, including therapeutic, recreational, and educational groups  Description: Interventions:  - Provide therapeutic and educational activities daily, encourage attendance and participation, and document same in the medical record   Outcome: Progressing  Goal: Complete daily ADLs, including personal hygiene independently, as able  Description: Interventions:  - Observe, teach, and assist patient with ADLS  -  Monitor and promote a balance of rest/activity, with adequate nutrition and elimination   Outcome: Progressing     Problem: Anxiety  Goal: Anxiety is at manageable level  Description: Interventions:  - Assess and monitor patient's anxiety level  - Monitor for signs and symptoms (heart palpitations, chest pain, shortness of breath, headaches, nausea, feeling jumpy, restlessness, irritable, apprehensive)  - Collaborate with interdisciplinary team and initiate plan and interventions as ordered    - [place] patient to unit/surroundings  - Explain treatment plan  - Encourage participation in care  - Encourage verbalization of concerns/fears  - Identify coping mechanisms  - Assist in developing anxiety-reducing skills  - Administer/offer alternative therapies  - Limit or eliminate stimulants  Outcome: Progressing

## 2020-08-20 NOTE — ED NOTES
Patient lunch tray ordered     [place] ([facility]) sandwich on rye, carrot cake, ginger ale     Stepan Rodriguez  08/20/20 2347

## 2020-08-21 PROBLEM — E55.9 VITAMIN D DEFICIENCY: Status: ACTIVE | Noted: 2020-08-21

## 2020-08-21 PROBLEM — F33.2 MDD (MAJOR DEPRESSIVE DISORDER), RECURRENT EPISODE, SEVERE (HCC): Status: ACTIVE | Noted: 2020-08-21

## 2020-08-21 PROBLEM — K21.9 GERD (GASTROESOPHAGEAL REFLUX DISEASE): Status: ACTIVE | Noted: 2020-08-21

## 2020-08-21 PROBLEM — M19.90 DJD (DEGENERATIVE JOINT DISEASE): Status: ACTIVE | Noted: 2020-08-21

## 2020-08-21 LAB
25(OH)D3 SERPL-MCNC: 22.1 NG/ML (ref 30–100)
FOLATE SERPL-MCNC: 12.5 NG/ML (ref 3.1–17.5)
VIT B12 SERPL-MCNC: 685 PG/ML (ref 100–900)

## 2020-08-21 PROCEDURE — 99222 1ST HOSP IP/OBS MODERATE 55: CPT | Performed by: PSYCHIATRY & NEUROLOGY

## 2020-08-21 RX ORDER — PANTOPRAZOLE SODIUM 40 MG/1
40 TABLET, DELAYED RELEASE ORAL
Status: DISCONTINUED | OUTPATIENT
Start: 2020-08-21 | End: 2020-09-02 | Stop reason: HOSPADM

## 2020-08-21 RX ORDER — ERGOCALCIFEROL 1.25 MG/1
50000 CAPSULE ORAL WEEKLY
Status: DISCONTINUED | OUTPATIENT
Start: 2020-08-21 | End: 2020-09-02 | Stop reason: HOSPADM

## 2020-08-21 RX ORDER — GABAPENTIN 100 MG/1
100 CAPSULE ORAL 3 TIMES DAILY
Status: DISCONTINUED | OUTPATIENT
Start: 2020-08-21 | End: 2020-09-02 | Stop reason: HOSPADM

## 2020-08-21 RX ADMIN — SUCRALFATE 1 G: 1 TABLET ORAL at 13:10

## 2020-08-21 RX ADMIN — SUCRALFATE 1 G: 1 TABLET ORAL at 21:10

## 2020-08-21 RX ADMIN — GABAPENTIN 100 MG: 100 CAPSULE ORAL at 21:10

## 2020-08-21 RX ADMIN — TRAZODONE HYDROCHLORIDE 50 MG: 50 TABLET ORAL at 21:16

## 2020-08-21 RX ADMIN — PANTOPRAZOLE SODIUM 40 MG: 40 TABLET, DELAYED RELEASE ORAL at 16:47

## 2020-08-21 RX ADMIN — SUCRALFATE 1 G: 1 TABLET ORAL at 16:47

## 2020-08-21 RX ADMIN — ERGOCALCIFEROL 50000 UNITS: 1.25 CAPSULE, LIQUID FILLED ORAL at 13:09

## 2020-08-21 RX ADMIN — SUCRALFATE 1 G: 1 TABLET ORAL at 08:22

## 2020-08-21 RX ADMIN — GABAPENTIN 100 MG: 100 CAPSULE ORAL at 13:09

## 2020-08-21 RX ADMIN — HYDROXYZINE HYDROCHLORIDE 50 MG: 25 TABLET, FILM COATED ORAL at 21:16

## 2020-08-21 RX ADMIN — METHADONE HYDROCHLORIDE 85 MG: 10 TABLET ORAL at 08:22

## 2020-08-21 RX ADMIN — GABAPENTIN 100 MG: 100 CAPSULE ORAL at 16:48

## 2020-08-21 RX ADMIN — FAMOTIDINE 20 MG: 20 TABLET, FILM COATED ORAL at 08:22

## 2020-08-21 NOTE — PLAN OF CARE
Problem: Ineffective Coping  Goal: Cooperates with admission process  Description: Interventions:   - Complete admission process  Outcome: Progressing     Problem: Ineffective Coping  Goal: Identifies healthy coping skills  Outcome: Progressing     Problem: Ineffective Coping  Goal: Participates in unit activities  Description: Interventions:  - Provide therapeutic environment   - Provide required programming   - Redirect inappropriate behaviors   Outcome: Progressing  Patient agreed to meet with this writer and complete [address] Admission Self Assessment and  Relapse Prevention plan  Patient stated that his biggest community stressor's were having a lack of transportation, having stomach/physical issues, being in process of being linked to Methadone clinic and emotional/anxiety issues  Patient claims the above has hindered his community living "all" of the time  What DM likes most about his life is woodworking, my hobby" and what he likes least is "his health" issues  Patient does have a GED 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  Patient also mentioned he has a good income due to collect his  wives' pension  Patient enjoys reading, woodworking, music, sports and walking  Patient believes working on his self-esteem, life skills, improving his short term memory and obtaining community supports/resources would be beneficial to him  Patient stated he will be ready for discharge "when my stomach is fixed "   Patient identified emotional problems, an increase in anxiety and feeling hopeless and confused as warning signs he may need help  Patient listed woodworking, exercise/walking and getting a pet dog as coping skills he utilizes in times of distress and identified his friends Elliott Schuster and Glenn Wu and his Skyler Muller as is community supports  Patient is aware he needs to contact community Doctor if he has feelings of hopelessness, an increase in anxiety and/or physical issues     Patient signed both documents and copies place in D/C folder  See D/C plan for follow up appointments and provider phone numbers

## 2020-08-21 NOTE — PROGRESS NOTES
08/21/20 0945   Team Meeting   Meeting Type Daily Rounds   Team Members Present   Team Members Present Physician;Nurse;;Occupational Therapist   Physician Team Member Dr Dorothy Caldwell MD, Neena Mccord, [address] [place] [address]   Nursing Team Member Domenico Deshpande, SONG   Care Management Team Member Stormy Love, MS, [facility], [facility]   OT Team Member [place], [place]   Patient/Family Present   Patient Present No   Patient's Family Present No     New admission, positive for thc and meth, uses methadone since the 80's came in with abdominal pain nausea, loss of appetite chills, increased anxiety and depression, hopeless, financial stressors, no transportation to get to outpatient treatment  Denies SI current  No D/c date at this time

## 2020-08-21 NOTE — PLAN OF CARE
Problem: Risk for Self Injury/Neglect  Goal: Treatment Goal: Remain safe during length of stay, learn and adopt new coping skills, and be free of self-injurious ideation, impulses and acts at the time of discharge  Outcome: Progressing  Goal: Verbalize thoughts and feelings  Description: Interventions:  - Assess and re-assess patient's lethality and potential for self-injury  - Engage patient in 1:1 interactions, daily, for a minimum of 15 minutes  - Encourage patient to express feelings, fears, frustrations, hopes  - Establish rapport/trust with patient   Outcome: Progressing  Goal: Refrain from harming self  Description: Interventions:  - Monitor patient closely, per order  - Develop a trusting relationship  - Supervise medication ingestion, monitor effects and side effects   Outcome: Progressing  Goal: Attend and participate in unit activities, including therapeutic, recreational, and educational groups  Description: Interventions:  - Provide therapeutic and educational activities daily, encourage attendance and participation, and document same in the medical record  - Obtain collateral information, encourage visitation and family involvement in care   Outcome: Progressing  Goal: Recognize maladaptive responses and adopt new coping mechanisms  Outcome: Progressing  Goal: Complete daily ADLs, including personal hygiene independently, as able  Description: Interventions:  - Observe, teach, and assist patient with ADLS  - Monitor and promote a balance of rest/activity, with adequate nutrition and elimination  Outcome: Progressing

## 2020-08-21 NOTE — TREATMENT PLAN
TREATMENT PLAN REVIEW - [address] Son[address] Mccormick 78 y o  1950 male MRN: [id number]    [address]  Room / Bed: Ashlyn Jarquin [facility] 586-44 Encounter: [id number]          Admit Date/Time:  8/20/2020  2:57 PM    Treatment Team: Attending Provider: Leonela Chun MD; Patient Care Assistant: Seamus Huizar; Consulting Physician: MICKY Krishnan; Patient Care Technician: Bisi Trivedi; Certified Nursing Assistant: Ta Mccabe; Recreational Therapist: Jacobo Naik; Care Manager: Cheo Rubio RN; Patient Care Assistant: Desire Phill Castleman; Registered Nurse: Christy Garcia RN; Physician Assistant: Hetal Stewart PA-C; Certified Nursing Assistant: Alberto Finney;  Consulting Physician: Kelechi Hopson MD    Diagnosis: Principal Problem:    MDD (major depressive disorder), recurrent episode, severe ([facility] )  Active Problems:    H/O opioid abuse ([facility] )    Asthma    Chronic pain    DJD (degenerative joint disease)    GERD (gastroesophageal reflux disease)    Vitamin D deficiency      Patient Strengths/Assets: communication skills, negotiates basic needs, patient is on a voluntary commitment    Patient Barriers/Limitations: difficulty adapting, lack of stable employment, limited support system, substance abuse    Short Term Goals: decrease in depressive symptoms, decrease in anxiety symptoms, decrease in suicidal thoughts, ability to stay safe on the unit, improvement in insight, improvement in self care, sleep improvement, improvement in appetite, mood stabilization, acceptance of need for psychiatric treatment, acceptance of psychiatric medications    Long Term Goals: improvement in depression, improvement in anxiety, resolution of depressive symptoms, stabilization of mood, free of suicidal thoughts, improvement in reasoning ability, improved insight, acceptance of need for psychiatric medications, acceptance of need for psychiatric treatment, acceptance of need for psychiatric follow up after discharge, adequate self care    Progress Towards Goals: starting psychiatric medications as prescribed    Recommended Treatment: medication management, patient medication education, group therapy, milieu therapy, continued Behavioral Health psychiatric evaluation/assessment process, medical follow up with medical team    Treatment Frequency: daily medication monitoring, group and milieu therapy daily, monitoring through interdisciplinary rounds, monitoring through weekly patient care conferences    Expected Discharge Date: 10 midnights     Discharge Plan: will be determined    Treatment Plan Created/Updated By: Mari Meza MD

## 2020-08-21 NOTE — PROGRESS NOTES
08/21/20 1000   Activity/Group Checklist   Group Admission/Discharge  (Admission Self Assessment/ Relapse Prevention Plan )   Attendance Attended   Attendance Duration (min) 31-45   Interactions Interacted appropriately   Affect/Mood Appropriate;Wide;Calm   Goals Achieved Identified feelings; Identified triggers; Identified relapse prevention strategies; Discussed coping strategies; Discussed discharge plans; Identified resources and support systems; Able to listen to others; Able to engage in interactions; Able to manage/cope with feelings; Able to self-disclose; Able to recieve feedback

## 2020-08-21 NOTE — CMS CERTIFICATION NOTE
Certification: Estimated length of stay: More than 10 midnights  I certify that inpatient services are medically necessary for this patient for a duration of greater that 10 midnights  See H&P and MD Progress Notes for additional information about the patient's course of treatment

## 2020-08-21 NOTE — PROGRESS NOTES
08/21/20 1030   Activity/Group Checklist   Group Life Skills   Attendance Attended   Attendance Duration (min) 46-60  (Pt taken and returned by doctor)   Interactions Interacted appropriately   Affect/Mood Appropriate   Goals Achieved Identified feelings; Able to listen to others; Able to engage in interactions; Able to reflect/comment on own behavior;Able to recieve feedback; Able to self-disclose

## 2020-08-21 NOTE — PROGRESS NOTES
08/21/20 0708   Activity/Group Checklist   Group Community meeting   Attendance Attended   Attendance Duration (min) Greater than 60   Interactions Interacted appropriately   Affect/Mood Appropriate   Goals Achieved Identified feelings; Able to listen to others; Able to engage in interactions; Able to self-disclose; Able to recieve feedback

## 2020-08-21 NOTE — PROGRESS NOTES
[id number]  Patient rated his depression 4/10 this evening and his anxiety 6/10  Received scheduled Neurontin and encouraged to try to use PRNs instead of coping skills and he verbalized understanding  He is visible in the community and is social with peers  Appropriate in conversation  Continues to complain of stomach pain but was able to eat 100 percent of his meal  Will continue monitoring

## 2020-08-21 NOTE — PROGRESS NOTES
Patient appears to have slept through the overnight period without issue  No acute behaviors noted  No complaints voiced  Will CTM via q7 minute safety checks

## 2020-08-21 NOTE — SOCIAL WORK
SANIYA placed call to [facility] [phone]  Notified of PT admission, requested confirmation on PT dosage  Answering service indicated she will page team and have them call CM back within the hour  CM place call to [address] [place] [address]  to notify of PT admission  Left message with

## 2020-08-21 NOTE — SOCIAL WORK
CM spoke with Marcia Joaquin , with [facility], methadone dose is 84 mg per day  CM to follow up with team on Monday to gain information on PT engagement in treatment as the office is closed at this time and Marcia Joaquin does not have access to this information
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
## 2020-08-21 NOTE — PROGRESS NOTES
Assumed care of this patient from prior shift RN at [phone]  Patient is currently in bed  He appears to be sleeping with no obvious signs of distress or discomfort  No complaints voiced  Will CTM via q7 minute safety checks

## 2020-08-21 NOTE — PROGRESS NOTES
[id number]  Patient out for snack and was social with peers  He rated his anxiety 7/10 this evening  Reported being unable to fall asleep due to his anxious thoughts and was given Atarax PRN at 2159  Will continue monitoring

## 2020-08-21 NOTE — PROGRESS NOTES
08/21/20 1430   Team Meeting   Meeting Type Tx Team Meeting   Team Members Present   Team Members Present Physician;Nurse;   Physician Team Member Dr Eller Krabbe, MD   Nursing Team Member Katya Landin, RN   Care Management Team Member Emily Bhandari MS, [facility]   Patient/Family Present   Patient Present Yes   Patient's Family Present No   TX team met, reviewed TX plan, goals, strengths, all in agreement and signed  PT indicated that he came in due to extreme anxiety and stomach pain  PT reported not following thorough with PCP and Psych referral made  PT reports continuing to go to the [facility] clinic for methadone management  PT reports that he continued taking medications given at last hospitalization  PT denies current SI/HI/AH/VH, reports depression is a 4 and anxiety a 7

## 2020-08-21 NOTE — SOCIAL WORK
CM met with PT  PT reported reason for admission is due to financial, transportation, and GI complaints  PT indicated that he did not follow up with psych referral or with PCP referral  PT is in agreement to CM making this referrals again  PT continues to retrieve 84mg methadone from the [facility] clinic  HE reported frustration because he states they told him he could go back to take home doses after 30 days of compliance but that they decided not to and they were to have a meeting yesterday that he did not go to because he was inpatient  PT indicated this was a trigger for him and his anxiety  PT believes his GI symptoms are related to his anxiety  PT indicated that he has a neighbor that owns deli below his apartment that he pays to take him to his appointments for methadone but that it is cheaper then him paying a taxi  PT indicated that his  at [facility] clinic is assisting him in gaining medical transport  PT signed OMAR for [facility] (methadone), PCP, and Psych follow up with [facility] outpatient psychiatry  Declined family contact, partial and D&A  CM completed psycho soc with PT   PT reported additional stressors include loss of wife 10 years ago chronic pain; limitations ar mental health; coping skills include biking, puzzles, taking things apart and putting them back together; HX of VENESSA; past hospitalizations 6/7/20-6/17/20 du to OD on methodone; Remeron and gabapentin from last hospitalization PT was taking; no HX of non compliance with exception of recent OD; denies SI/SA in the past aside from one prior to admission with OD; denies HX of abuse/trauma; father had depression; no family HX of suicide/homicide; paternal grandfather ETOH; no family HX of dementia; reports smoking marijuana socially 1 time a week, denies other substance use; quit smoking a year ago; optiate addiction years clean from heroin uses methadone since the 80's; started psychodelic in childhood; heterosexual; has 2 step daughters; no pets dog passed two years ago; parents are ; has 2 brothers one he speaks with and 3 sisters; able to return to apartment; graduated highschool wanted to go in [employer] but his father talked him out of this-PT reported resentment because of this; was told he had dyslexia in the 7th grade but parents did not follow through in fear of PT getting stigmatized; no assistive devices; no barriers in home; Amish/Buddist marisela; denies cultural needs; uses taxi/friend for transportation; SSI and  wife pension-2000 a month; able to pay for medication; no guardian or poa; [facility] [address]

## 2020-08-21 NOTE — PROGRESS NOTES
Progress Note - Brayden Nair 78 y o  male MRN: [id number]    Unit/Bed#: [facility] 201-01 Encounter: [id number]        Subjective:   Patient seen and examined at bedside after reviewing the chart and discussing the case with the caring staff  Patient examined at bedside  Patient complains of epigastric pain and nausea  On 08/20/2020 patient's vitamin B12 level was found to be within normal limits at 685 and his vitamin D was found to be low at 22 1  Physical Exam   Vitals: Blood pressure 154/72, pulse 62, temperature 98 7 °F (37 1 °C), temperature source Temporal, resp  rate 18, height 5' 10" (1 778 m), weight 80 7 kg (178 lb), SpO2 97 %  ,Body mass index is 25 54 kg/m²  Constitutional: He appears well-developed  HEENT: PERR, EOMI, MMM  Cardiovascular: Normal rate and regular rhythm  Pulmonary/Chest: Effort normal and breath sounds normal    Abdomen: Soft, + BS, tender to palpation in epigastric region    Assessment/Plan:  Brayden Nair is a(n) 78y o  year old male with MDD     1  Asthma  Albuterol inhaler as needed  2  Chronic Pain Syndrome  Tylenol as needed for mild-moderate pains  Will restart Methadone liquid at 84mg once daily  3  Opiate Induced Constipation  Senokot-S PRN  4  Gait abnormality  Patient seems stable at this time  5  DJD/osteoarthritis  Patient may get Tylenol on as needed basis  6  Vitamin-D deficiency  Patient's vitamin-D was found to be low at 22 1 on 08/28/2020  I will start the patient on vitamin D2 weekly for 8 weeks and then vitamin D3 daily  (I am unable to order D3 at this time, will try again)  7  Epigastric pain/nausea  CT scan on 08/18/2020 was unable to exclude gastritis/gastric pathology  Continue Protonix and Carafate  Consult to GI has been placed  The patient was discussed with Dr Mekhi Mccoy and he is in agreement with the above note

## 2020-08-22 PROCEDURE — 99232 SBSQ HOSP IP/OBS MODERATE 35: CPT | Performed by: PSYCHIATRY & NEUROLOGY

## 2020-08-22 RX ORDER — MELATONIN
1000 DAILY
Status: DISCONTINUED | OUTPATIENT
Start: 2020-09-11 | End: 2020-09-02 | Stop reason: HOSPADM

## 2020-08-22 RX ADMIN — PANTOPRAZOLE SODIUM 40 MG: 40 TABLET, DELAYED RELEASE ORAL at 16:45

## 2020-08-22 RX ADMIN — GABAPENTIN 100 MG: 100 CAPSULE ORAL at 08:48

## 2020-08-22 RX ADMIN — SUCRALFATE 1 G: 1 TABLET ORAL at 17:06

## 2020-08-22 RX ADMIN — SUCRALFATE 1 G: 1 TABLET ORAL at 08:49

## 2020-08-22 RX ADMIN — SUCRALFATE 1 G: 1 TABLET ORAL at 11:54

## 2020-08-22 RX ADMIN — PANTOPRAZOLE SODIUM 40 MG: 40 TABLET, DELAYED RELEASE ORAL at 08:49

## 2020-08-22 RX ADMIN — GABAPENTIN 100 MG: 100 CAPSULE ORAL at 21:01

## 2020-08-22 RX ADMIN — SUCRALFATE 1 G: 1 TABLET ORAL at 21:01

## 2020-08-22 RX ADMIN — GABAPENTIN 100 MG: 100 CAPSULE ORAL at 16:45

## 2020-08-22 RX ADMIN — METHADONE HYDROCHLORIDE 85 MG: 10 TABLET ORAL at 08:49

## 2020-08-22 RX ADMIN — TRAZODONE HYDROCHLORIDE 50 MG: 50 TABLET ORAL at 21:05

## 2020-08-22 NOTE — NURSING NOTE
Pt withdrawn to room affect brightens with interaction  Reports moderate anxiety during initial round  Relaxation/ activity didn't relieve anxiety  Offered and accepted prn anxiolytic  Denies SI or HI  No acute behavioral issues noted  Q 7 min checks ongoing

## 2020-08-22 NOTE — PROGRESS NOTES
Progress Note - María Gray 78 y o  male MRN: [id number]    Unit/Bed#: [facility] 201-01 Encounter: [id number]        Subjective:   Patient seen and examined at bedside after reviewing the chart and discussing the case with the caring staff  Patient examined at bedside  Patient complains of epigastric pain and nausea  On 08/20/2020 patient's vitamin B12 level was found to be within normal limits at 685 and his vitamin D was found to be low at 22 1  Physical Exam   Vitals: Blood pressure 138/71, pulse 65, temperature 97 7 °F (36 5 °C), temperature source Temporal, resp  rate 18, height 5' 10" (1 778 m), weight 78 7 kg (173 lb 6 4 oz), SpO2 98 %  ,Body mass index is 24 88 kg/m²  Constitutional: He appears well-developed  HEENT: PERR, EOMI, MMM  Cardiovascular: Normal rate and regular rhythm  Pulmonary/Chest: Effort normal and breath sounds normal    Abdomen: Soft, + BS, tender to palpation in epigastric region    Assessment/Plan:  María Gray is a(n) 78y o  year old male with MDD     1  Asthma  Albuterol inhaler as needed  2  Chronic Pain Syndrome  Tylenol as needed for mild-moderate pains  Will restart Methadone liquid at 84mg once daily  3  Opiate Induced Constipation  Senokot-S PRN  4  Gait abnormality  Patient seems stable at this time  5  DJD/osteoarthritis  Patient may get Tylenol on as needed basis  6  Vitamin-D deficiency  I will start the patient on vitamin D2 weekly for 8 weeks and then vitamin D3 daily 1000 units daily  7  Epigastric pain/nausea  CT scan on 08/18/2020 was unable to exclude gastritis/gastric pathology  Continue Protonix and Carafate  Consult to GI has been placed patient wants to get is EGD on the day of discharge before going home

## 2020-08-22 NOTE — PROGRESS NOTES
Progress Note - Behavioral Health   Oriana Gonzalez 78 y o  male MRN: [id number]  Unit/Bed#: [facility] 201-01 Encounter: [id number]    Assessment/Plan   Principal Problem:    MDD (major depressive disorder), recurrent episode, severe ([address] )  Active Problems:    H/O opioid abuse (HCC)    Asthma    Chronic pain    DJD (degenerative joint disease)    GERD (gastroesophageal reflux disease)    Vitamin D deficiency      Behavior over the last 24 hours:  unchanged  Sleep: normal  Appetite: normal  Medication side effects: No  ROS: no complaints    Mental Status Evaluation:  Appearance:  casually dressed   Behavior:  psychomotor retardation   Speech:  soft   Mood:  anxious and depressed   Affect:  constricted   Thought Process:  goal directed   Thought Content:  obsessions   Perceptual Disturbances: None   Risk Potential: Suicidal Ideations none  Homicidal Ideations none  Potential for Aggression No   Sensorium:  person, place, time/date, situation, day of week, month of year and year   Memory:  recent and remote memory grossly intact   Consciousness:  awake    Attention: attention span and concentration were age appropriate   Insight:  fair   Judgment: limited   Gait/Station: normal gait/station   Motor Activity: no abnormal movements     Progress Toward Goals: Pt cont to struggle with lots of anxiety and mood related symptoms,describes his mood as being quite low with anhedonia and some despair,he is concerned about his on-going anxiety symptoms which have been quite distressing  He is medication compliant and does acknowledge some progress made with treatment  Pt denies suicidal consideration  Recommended Treatment:   1-Cont current treatment  2-Continue with group therapy, milieu therapy and occupational therapy  Risks, benefits and possible side effects of Medications:   Risks, benefits, and possible side effects of medications explained to patient and patient verbalizes understanding        Medications:   current meds: Current Facility-Administered Medications   Medication Dose Route Frequency    acetaminophen (TYLENOL) tablet 650 mg  650 mg Oral Q6H PRN    acetaminophen (TYLENOL) tablet 650 mg  650 mg Oral Q4H PRN    acetaminophen (TYLENOL) tablet 975 mg  975 mg Oral Q6H PRN    albuterol (PROVENTIL HFA,VENTOLIN HFA) inhaler 2 puff  2 puff Inhalation Q4H PRN    aluminum-magnesium hydroxide-simethicone (MYLANTA) 200-200-20 mg/5 mL oral suspension 30 mL  30 mL Oral Q4H PRN    ergocalciferol (VITAMIN D2) capsule 50,000 Units  50,000 Units Oral Weekly    gabapentin (NEURONTIN) capsule 100 mg  100 mg Oral TID    hydrOXYzine HCL (ATARAX) tablet 25 mg  25 mg Oral Q4H PRN    hydrOXYzine HCL (ATARAX) tablet 50 mg  50 mg Oral Q6H PRN    methadone (DOLOPHINE) tablet 85 mg  85 mg Oral Daily    OLANZapine (ZyPREXA) IM injection 5 mg  5 mg Intramuscular Q6H PRN    OLANZapine (ZyPREXA) tablet 5 mg  5 mg Oral Q8H PRN    ondansetron (ZOFRAN-ODT) dispersible tablet 4 mg  4 mg Oral Q6H PRN    pantoprazole (PROTONIX) EC tablet 40 mg  40 mg Oral BID AC    polyethylene glycol (MIRALAX) packet 17 g  17 g Oral Daily PRN    senna-docusate sodium (SENOKOT S) 8 6-50 mg per tablet 1 tablet  1 tablet Oral Daily PRN    sucralfate (CARAFATE) tablet 1 g  1 g Oral 4x Daily    traZODone (DESYREL) tablet 50 mg  50 mg Oral HS PRN     Labs: I have personally reviewed all pertinent laboratory/tests results     Most Recent Labs:   Lab Results   Component Value Date    WBC 8 21 08/18/2020    RBC 4 58 08/18/2020    HGB 14 9 08/18/2020    HCT 44 5 08/18/2020     08/18/2020    RDW 13 2 08/18/2020    NEUTROABS 4 51 08/18/2020    SODIUM 143 08/18/2020    K 3 9 08/18/2020     08/18/2020    CO2 27 08/18/2020    BUN 15 08/18/2020    CREATININE 1 24 08/18/2020    GLUC 92 08/18/2020    CALCIUM 8 9 08/18/2020    AST 40 08/18/2020    ALT 54 08/18/2020    ALKPHOS 72 08/18/2020    TP 7 0 08/18/2020    ALB 3 6 08/18/2020    TBILI 0 50 08/18/2020 [id number] 3 889 (H) 08/18/2020    FREET4 1 16 08/18/2020       Counseling / Coordination of Care  Total floor / unit time spent today 25 minutes  Greater than 50% of total time was spent with the patient and / or family counseling and / or coordination of care   A description of the counseling / coordination of care:

## 2020-08-23 PROCEDURE — 99231 SBSQ HOSP IP/OBS SF/LOW 25: CPT | Performed by: PSYCHIATRY & NEUROLOGY

## 2020-08-23 RX ADMIN — TRAZODONE HYDROCHLORIDE 50 MG: 50 TABLET ORAL at 21:45

## 2020-08-23 RX ADMIN — SUCRALFATE 1 G: 1 TABLET ORAL at 11:32

## 2020-08-23 RX ADMIN — SUCRALFATE 1 G: 1 TABLET ORAL at 16:45

## 2020-08-23 RX ADMIN — SUCRALFATE 1 G: 1 TABLET ORAL at 20:55

## 2020-08-23 RX ADMIN — GABAPENTIN 100 MG: 100 CAPSULE ORAL at 20:55

## 2020-08-23 RX ADMIN — GABAPENTIN 100 MG: 100 CAPSULE ORAL at 08:36

## 2020-08-23 RX ADMIN — PANTOPRAZOLE SODIUM 40 MG: 40 TABLET, DELAYED RELEASE ORAL at 16:45

## 2020-08-23 RX ADMIN — SUCRALFATE 1 G: 1 TABLET ORAL at 08:35

## 2020-08-23 RX ADMIN — GABAPENTIN 100 MG: 100 CAPSULE ORAL at 16:45

## 2020-08-23 RX ADMIN — PANTOPRAZOLE SODIUM 40 MG: 40 TABLET, DELAYED RELEASE ORAL at 06:23

## 2020-08-23 RX ADMIN — METHADONE HYDROCHLORIDE 85 MG: 10 TABLET ORAL at 08:36

## 2020-08-23 NOTE — PROGRESS NOTES
Pt present in milieu for meals and group  Social with peers  Affect is flat but brighten upon peer interaction  Mood is depressed  Pt rates anxiety and depression 8/10  Denies SI/HI/AH/VH  Rates back 5/10 this morning  Compliant with medications  No acute behaviors noted  Q 7 min safety checks maintained

## 2020-08-23 NOTE — PROGRESS NOTES
Pt observed to be out in the community, interactive with staff, but otherwise mostly quiet  His affect was flat, mood was depressed  Pt focused on his stomach pains and pain in general, but otherwise stated that he was functional and trying to get much better  Pt denied suicidal ideation  Pt checked Q7 minutes for safety

## 2020-08-23 NOTE — PROGRESS NOTES
[id number]  Patient visible in the community and is social with peers  Upon assessment, patient reports feeling "okay" this evening and rated his anxiety and depression both 7/10  He states the Neurontin seems to be less effective at this time but declined any PRNs  No complaints of pain  Compliant with medications  Will continue monitoring

## 2020-08-23 NOTE — PROGRESS NOTES
Assumed care of this patient from prior shift RN at [phone]  Patient is currently in bed sleeping  No acute behaviors noted  Will CTM via q7 minute safety checks

## 2020-08-23 NOTE — PROGRESS NOTES
Progress Note - Behavioral Health   Rizwana Osborne 78 y o  male MRN: [id number]  Unit/Bed#: [facility] 201-01 Encounter: [id number]    Assessment/Plan   Principal Problem:    MDD (major depressive disorder), recurrent episode, severe ([facility] [address] )  Active Problems:    H/O opioid abuse (HCC)    Asthma    Chronic pain    DJD (degenerative joint disease)    GERD (gastroesophageal reflux disease)    Vitamin D deficiency      Behavior over the last 24 hours:  unchanged  Sleep: normal  Appetite: normal  Medication side effects: No  ROS: no complaints    Mental Status Evaluation:  Appearance:  casually dressed   Behavior:  psychomotor retardation   Speech:  soft   Mood:  anxious and depressed   Affect:  constricted   Thought Process:  goal directed   Thought Content:  obsessions   Perceptual Disturbances: None   Risk Potential: Suicidal Ideations none  Homicidal Ideations none  Potential for Aggression No   Sensorium:  person, place, time/date, situation, day of week, month of year and year   Memory:  recent and remote memory grossly intact   Consciousness:  awake    Attention: attention span and concentration were age appropriate   Insight:  fair   Judgment: limited   Gait/Station: normal gait/station   Motor Activity: no abnormal movements     Progress Toward Goals: Pt seen out in the milue,nil overnight issues as per nursing staff though anxiety related symptoms persist mostly stemming from his GI issues  He is medication compliant and does acknowledge some progress made with treatment  Pt denies suicidal consideration  Pt is engaged in the unit milue  Recommended Treatment:   1-Cont current treatment  2-Continue with group therapy, milieu therapy and occupational therapy  Risks, benefits and possible side effects of Medications:   Risks, benefits, and possible side effects of medications explained to patient and patient verbalizes understanding        Medications:   current meds:   Current Facility-Administered Medications Medication Dose Route Frequency    acetaminophen (TYLENOL) tablet 650 mg  650 mg Oral Q6H PRN    acetaminophen (TYLENOL) tablet 650 mg  650 mg Oral Q4H PRN    acetaminophen (TYLENOL) tablet 975 mg  975 mg Oral Q6H PRN    albuterol (PROVENTIL HFA,VENTOLIN HFA) inhaler 2 puff  2 puff Inhalation Q4H PRN    aluminum-magnesium hydroxide-simethicone (MYLANTA) 200-200-20 mg/5 mL oral suspension 30 mL  30 mL Oral Q4H PRN    [START ON 9/11/2020] cholecalciferol (VITAMIN D3) tablet 1,000 Units  1,000 Units Oral Daily    ergocalciferol (VITAMIN D2) capsule 50,000 Units  50,000 Units Oral Weekly    gabapentin (NEURONTIN) capsule 100 mg  100 mg Oral TID    hydrOXYzine HCL (ATARAX) tablet 25 mg  25 mg Oral Q4H PRN    hydrOXYzine HCL (ATARAX) tablet 50 mg  50 mg Oral Q6H PRN    methadone (DOLOPHINE) tablet 85 mg  85 mg Oral Daily    OLANZapine (ZyPREXA) IM injection 5 mg  5 mg Intramuscular Q6H PRN    OLANZapine (ZyPREXA) tablet 5 mg  5 mg Oral Q8H PRN    ondansetron (ZOFRAN-ODT) dispersible tablet 4 mg  4 mg Oral Q6H PRN    pantoprazole (PROTONIX) EC tablet 40 mg  40 mg Oral BID AC    polyethylene glycol (MIRALAX) packet 17 g  17 g Oral Daily PRN    senna-docusate sodium (SENOKOT S) 8 6-50 mg per tablet 1 tablet  1 tablet Oral Daily PRN    sucralfate (CARAFATE) tablet 1 g  1 g Oral 4x Daily    traZODone (DESYREL) tablet 50 mg  50 mg Oral HS PRN     Labs: I have personally reviewed all pertinent laboratory/tests results     Most Recent Labs:   Lab Results   Component Value Date    WBC 8 21 08/18/2020    RBC 4 58 08/18/2020    HGB 14 9 08/18/2020    HCT 44 5 08/18/2020     08/18/2020    RDW 13 2 08/18/2020    NEUTROABS 4 51 08/18/2020    SODIUM 143 08/18/2020    K 3 9 08/18/2020     08/18/2020    CO2 27 08/18/2020    BUN 15 08/18/2020    CREATININE 1 24 08/18/2020    GLUC 92 08/18/2020    CALCIUM 8 9 08/18/2020    AST 40 08/18/2020    ALT 54 08/18/2020    ALKPHOS 72 08/18/2020    TP 7 0 08/18/2020    ALB 3 6 08/18/2020    TBILI 0 50 08/18/2020    [id number] 3 889 (H) 08/18/2020    FREET4 1 16 08/18/2020       Counseling / Coordination of Care  Total floor / unit time spent today 25 minutes  Greater than 50% of total time was spent with the patient and / or family counseling and / or coordination of care   A description of the counseling / coordination of care:

## 2020-08-23 NOTE — PROGRESS NOTES
Patient appears to have slept through the overnight hours without any apparent issue  No complaints voiced  No acute behaviors observed  Will CTM via q7 minute safety checks

## 2020-08-24 PROCEDURE — 99232 SBSQ HOSP IP/OBS MODERATE 35: CPT | Performed by: PSYCHIATRY & NEUROLOGY

## 2020-08-24 RX ORDER — MIRTAZAPINE 15 MG/1
7.5 TABLET, FILM COATED ORAL
Status: DISCONTINUED | OUTPATIENT
Start: 2020-08-24 | End: 2020-08-25

## 2020-08-24 RX ADMIN — GABAPENTIN 100 MG: 100 CAPSULE ORAL at 16:58

## 2020-08-24 RX ADMIN — METHADONE HYDROCHLORIDE 85 MG: 10 TABLET ORAL at 08:19

## 2020-08-24 RX ADMIN — MIRTAZAPINE 7.5 MG: 15 TABLET, FILM COATED ORAL at 21:41

## 2020-08-24 RX ADMIN — GABAPENTIN 100 MG: 100 CAPSULE ORAL at 08:19

## 2020-08-24 RX ADMIN — TRAZODONE HYDROCHLORIDE 50 MG: 50 TABLET ORAL at 21:46

## 2020-08-24 RX ADMIN — SUCRALFATE 1 G: 1 TABLET ORAL at 16:58

## 2020-08-24 RX ADMIN — SUCRALFATE 1 G: 1 TABLET ORAL at 21:41

## 2020-08-24 RX ADMIN — SUCRALFATE 1 G: 1 TABLET ORAL at 08:20

## 2020-08-24 RX ADMIN — PANTOPRAZOLE SODIUM 40 MG: 40 TABLET, DELAYED RELEASE ORAL at 16:58

## 2020-08-24 RX ADMIN — GABAPENTIN 100 MG: 100 CAPSULE ORAL at 21:41

## 2020-08-24 RX ADMIN — PANTOPRAZOLE SODIUM 40 MG: 40 TABLET, DELAYED RELEASE ORAL at 06:16

## 2020-08-24 RX ADMIN — SUCRALFATE 1 G: 1 TABLET ORAL at 12:16

## 2020-08-24 NOTE — PROGRESS NOTES
Progress Note - Behavioral Health   [facility] 78 [facility]  [facility] MRN: [id number]  Unit/Bed#: [facility] 201-01 Encounter: [id number]    Assessment/Plan   Principal Problem:    MDD (major depressive disorder), recurrent episode, severe ([facility] [address] )  Active Problems:    H/O opioid abuse (HCC)    Asthma    Chronic pain    DJD (degenerative joint disease)    GERD (gastroesophageal reflux disease)    Vitamin D deficiency      Behavior over the last 24 hours:  Some improvement  Sleep: slept better  Appetite: increased  Medication side effects: No  ROS: no complaints, all other systems are negative for acute changes    Mental Status Evaluation:  Appearance:  age appropriate   Behavior:  cooperative   Speech:  normal volume   Mood:  anxious   Affect:  mood-congruent   Thought Process:  goal directed   Thought Content:  somatically preoccupied   Perceptual Disturbances: None   Risk Potential: Suicidal Ideations none  Homicidal Ideations none  Potential for Aggression No   Sensorium:  person, place and time/date   Memory:  recent and remote memory grossly intact   Consciousness:  alert and awake    Attention: attention span and concentration were age appropriate   Insight:  limited   Judgment: fair   Gait/Station: normal gait/station   Motor Activity: no abnormal movements     Progress Toward Goals: Patient reports some improvement with reduced anxiety and depressed mood  He continues struggling with intermittent passive wish to die and limited coping with his chronic pain and GI issues  He has been compliant with medication and denies any current side effects  Recommended Treatment: Continue with group therapy, milieu therapy and occupational therapy  Risks, benefits and possible side effects of Medications:   Risks, benefits, and possible side effects of medications explained to patient and patient verbalizes understanding  Medications: all current active meds have been reviewed  Begin Remeotn 7 5 mg hs  Labs:  I have personally reviewed all pertinent laboratory/tests results  Most Recent Labs:   Lab Results   Component Value Date    WBC 8 21 08/18/2020    RBC 4 58 08/18/2020    HGB 14 9 08/18/2020    HCT 44 5 08/18/2020     08/18/2020    RDW 13 2 08/18/2020    NEUTROABS 4 51 08/18/2020    SODIUM 143 08/18/2020    K 3 9 08/18/2020     08/18/2020    CO2 27 08/18/2020    BUN 15 08/18/2020    CREATININE 1 24 08/18/2020    GLUC 92 08/18/2020    CALCIUM 8 9 08/18/2020    AST 40 08/18/2020    ALT 54 08/18/2020    ALKPHOS 72 08/18/2020    TP 7 0 08/18/2020    ALB 3 6 08/18/2020    TBILI 0 50 08/18/2020    [id number] 3 889 (H) 08/18/2020    FREET4 1 16 08/18/2020       Counseling / Coordination of Care  Total floor / unit time spent today 20 minutes  Greater than 50% of total time was spent with the patient and / or family counseling and / or coordination of care

## 2020-08-24 NOTE — PLAN OF CARE
Problem: Ineffective Coping  Goal: Cooperates with admission process  Description: Interventions:   - Complete admission process  Outcome: Progressing  Goal: Identifies ineffective coping skills  Outcome: Progressing  Goal: Identifies healthy coping skills  Outcome: Progressing  Goal: Demonstrates healthy coping skills  Outcome: Progressing  Goal: Understands least restrictive measures  Description: Interventions:  - Utilize least restrictive behavior  Outcome: Progressing  Goal: Free from restraint events  Description: - Utilize least restrictive measures   - Provide behavioral interventions   - Redirect inappropriate behaviors   Outcome: Progressing     Problem: Risk for Self Injury/Neglect  Goal: Verbalize thoughts and feelings  Description: Interventions:  - Assess and re-assess patient's lethality and potential for self-injury  - Engage patient in 1:1 interactions, daily, for a minimum of 15 minutes  - Encourage patient to express feelings, fears, frustrations, hopes  - Establish rapport/trust with patient   Outcome: Progressing  Goal: Refrain from harming self  Description: Interventions:  - Monitor patient closely, per order  - Develop a trusting relationship  - Supervise medication ingestion, monitor effects and side effects   Outcome: Progressing     Problem: Depression  Goal: Refrain from isolation  Description: Interventions:  - Develop a trusting relationship   - Encourage socialization   Outcome: Progressing  Goal: Refrain from self-neglect  Outcome: Progressing     Problem: Anxiety  Goal: Anxiety is at manageable level  Description: Interventions:  - Assess and monitor patient's anxiety level  - Monitor for signs and symptoms (heart palpitations, chest pain, shortness of breath, headaches, nausea, feeling jumpy, restlessness, irritable, apprehensive)  - Collaborate with interdisciplinary team and initiate plan and interventions as ordered    - [place] patient to unit/surroundings  - Explain treatment plan  - Encourage participation in care  - Encourage verbalization of concerns/fears  - Identify coping mechanisms  - Assist in developing anxiety-reducing skills  - Administer/offer alternative therapies  - Limit or eliminate stimulants  Outcome: Progressing

## 2020-08-24 NOTE — PROGRESS NOTES
08/24/20 0730   Activity/Group Checklist   Group Community meeting   Attendance Attended   Attendance Duration (min) Greater than 60   Interactions Interacted appropriately   Affect/Mood Appropriate   Goals Achieved Able to listen to others; Able to engage in interactions; Able to self-disclose; Able to recieve feedback

## 2020-08-24 NOTE — CONSULTS
Consultation - GI   Jami Davey 78 y o  male MRN: [id number]  Unit/Bed#: [facility] 201-01 Encounter: [id number]      Assessment/Plan     Assessment:  1  Nausea with vomiting  2  Upper abdominal pain  3  Abnormal CT scan - questionable gastritis  Plan:  The case will be discussed with Dr Frank Wallace  The patient will continue Zofran as needed, Carafate every 4 hours and Protonix twice a day since this has been helping  He has not had to use the Zofran at all  He will need outpatient EGD for further evaluation but wants to await his day of discharge to get completed  We discussed diet and lifestyle changes to follow for relief of his symptoms  Education provided on all potential causes however symptoms worse with increasing stress/anxiety  The EGD procedure and preparation were discussed with him in detail  Risks and benefits reviewed  He verbally consented for the EGD procedure  All of his questions were answered  History of Present Illness   Physician Requesting Consult: Vazquez Stockton MD  Reason for Consult / Principal Problem: Abnormal imaging with symptoms  Hx and PE limited by:   HPI: Jami Davey is a 78y o  year old male who presents with upper abdominal discomfort with nausea and vomiting that has been ongoing for the past few days  He admits that he came into the ER on 8/18/20 due to his symptoms ongoing for 3 days prior  He had a CT scan completed which his stomach evaluation was limited and it was difficult to exclude gastritis/gastric pathology in the setting  However no acute intra-abdominal abnormality was seen  He has been started on Carafate and Protonix which has been improving his symptoms  He has not had to use any Zofran which has been ordered for him  His abdominal pain, which is located in his upper abdomen also has resolved  He has never had an EGD before  He reports that his bowel movements are normal without any melena or rectal bleeding       Inpatient consult to gastroenterology  Consult performed by: Rimma Sheppard PA-C  Consult ordered by: Rona Mendoza MD          Review of Systems   Constitutional: Negative for unexpected weight change  HENT: Negative for trouble swallowing  Respiratory: Negative for shortness of breath  Cardiovascular: Negative for chest pain  Gastrointestinal: Positive for abdominal distention, abdominal pain, nausea and vomiting  Negative for anal bleeding, blood in stool, constipation, diarrhea and rectal pain  Historical Information   Past Medical History:   Diagnosis Date    Addiction to drug ([address] )     Asthma      Past Surgical History:   Procedure Laterality Date    ANKLE SURGERY      KNEE SURGERY      RIB FRACTURE SURGERY       Social History   Social History     Substance and Sexual Activity   Alcohol Use Not Currently     Social History     Substance and Sexual Activity   Drug Use Yes    Types: Marijuana    Comment: Pt denies use of substances although ED UDS resulted +THC     E-Cigarette/Vaping    E-Cigarette Use Former User      E-Cigarette/Vaping Substances    Nicotine No     THC No     CBD No     Flavoring No     Other No     Unknown No      Social History     Tobacco Use   Smoking Status Former Smoker   Smokeless Tobacco Never Used     Family History: History reviewed  No pertinent family history      Meds/Allergies   all current active meds have been reviewed, current meds:   Current Facility-Administered Medications   Medication Dose Route Frequency    acetaminophen (TYLENOL) tablet 650 mg  650 mg Oral Q6H PRN    acetaminophen (TYLENOL) tablet 650 mg  650 mg Oral Q4H PRN    acetaminophen (TYLENOL) tablet 975 mg  975 mg Oral Q6H PRN    albuterol (PROVENTIL HFA,VENTOLIN HFA) inhaler 2 puff  2 puff Inhalation Q4H PRN    aluminum-magnesium hydroxide-simethicone (MYLANTA) 200-200-20 mg/5 mL oral suspension 30 mL  30 mL Oral Q4H PRN    [START ON 9/11/2020] cholecalciferol (VITAMIN D3) tablet 1,000 Units  1,000 Units Oral Daily    ergocalciferol (VITAMIN D2) capsule 50,000 Units  50,000 Units Oral Weekly    gabapentin (NEURONTIN) capsule 100 mg  100 mg Oral TID    hydrOXYzine HCL (ATARAX) tablet 25 mg  25 mg Oral Q4H PRN    hydrOXYzine HCL (ATARAX) tablet 50 mg  50 mg Oral Q6H PRN    methadone (DOLOPHINE) tablet 85 mg  85 mg Oral Daily    OLANZapine (ZyPREXA) IM injection 5 mg  5 mg Intramuscular Q6H PRN    OLANZapine (ZyPREXA) tablet 5 mg  5 mg Oral Q8H PRN    ondansetron (ZOFRAN-ODT) dispersible tablet 4 mg  4 mg Oral Q6H PRN    pantoprazole (PROTONIX) EC tablet 40 mg  40 mg Oral BID AC    polyethylene glycol (MIRALAX) packet 17 g  17 g Oral Daily PRN    senna-docusate sodium (SENOKOT S) 8 6-50 mg per tablet 1 tablet  1 tablet Oral Daily PRN    sucralfate (CARAFATE) tablet 1 g  1 g Oral 4x Daily    traZODone (DESYREL) tablet 50 mg  50 mg Oral HS PRN    and PTA meds:   Prior to Admission Medications   Prescriptions Last Dose Informant Patient Reported? Taking?    albuterol (PROVENTIL HFA,VENTOLIN HFA) 90 mcg/act inhaler Not Taking at Unknown time  No No   Sig: Inhale 2 puffs every 4 (four) hours as needed for wheezing   Patient not taking: Reported on 8/18/2020   famotidine (PEPCID) 20 mg tablet Past Week at Unknown time  No Yes   Sig: Take 1 tablet (20 mg total) by mouth 2 (two) times a day   gabapentin (NEURONTIN) 100 mg capsule Past Week at Unknown time  No Yes   Sig: TAKE 1 CAPSULE BY MOUTH THREE TIMES A DAY   methadone (DOLOPHINE) 10 MG/5ML solution 8/19/2020 at Unknown time  Yes Yes   Sig: Take 84 mg by mouth daily   mirtazapine (REMERON) 30 mg tablet Past Week at Unknown time  No Yes   Sig: TAKE 1 TABLET (30 MG TOTAL) BY MOUTH DAILY AT BEDTIME   ondansetron (ZOFRAN-ODT) 4 mg disintegrating tablet Not Taking at Unknown time  No No   Sig: Take 1 tablet (4 mg total) by mouth every 6 (six) hours as needed for nausea or vomiting   Patient not taking: Reported on 8/20/2020   sucralfate (CARAFATE) 1 g tablet Not Taking at Unknown time  No No   Sig: Take 1 tablet (1 g total) by mouth 4 (four) times a day   Patient not taking: Reported on 8/20/2020      Facility-Administered Medications: None       Allergies   Allergen Reactions    Aspirin        Objective       Intake/Output Summary (Last 24 hours) at 8/24/2020 0821  Last data filed at 8/23/2020 2030  Gross per 24 hour   Intake 585 ml   Output --   Net 585 ml       Invasive Devices:      Vitals:    08/24/20 0700   BP: 124/61   Pulse: 71   Resp: 18   Temp: (!) 97 4 °F (36 3 °C)   SpO2: 100%       Physical Exam  Vitals signs and nursing note reviewed  Constitutional:       Appearance: Normal appearance  HENT:      Head: Normocephalic and atraumatic  Nose: Nose normal    Neck:      Musculoskeletal: Neck supple  Cardiovascular:      Rate and Rhythm: Normal rate and regular rhythm  Heart sounds: Normal heart sounds  Pulmonary:      Effort: Pulmonary effort is normal       Breath sounds: Normal breath sounds  Abdominal:      General: Bowel sounds are normal  There is no distension  Palpations: Abdomen is soft  Tenderness: There is no abdominal tenderness  Neurological:      General: No focal deficit present  Mental Status: He is alert and oriented to person, place, and time  Lab Results: I have personally reviewed pertinent reports  Imaging Studies: I have personally reviewed pertinent reports  Counseling / Coordination of Care  Total floor / unit time spent today 50 minutes  Greater than 50% of total time was spent with the patient and / or family counseling and / or coordination of care  A description of the counseling / coordination of care: direct patient care, review of patient's medical record, discussion with hospital staff and supervising physician

## 2020-08-24 NOTE — PROGRESS NOTES
08/24/20 0941   Team Members Present   Team Members Present Physician;Nurse;;Occupational Therapist   Physician Team Member Dr Umair Dowling MD; Edmond Remy [place]   Nursing Team Member Kate Coker RN   Care Management Team Member MS Leanna, [facility]   OT Team Member Blanca Mcgraw [place]   Patient/Family Present   Patient Present No   Patient's Family Present No     PT to follow up with [facility] and also with [address] [place] [address] outpatient psych  Referral made for pcp  Rates anxiety and depression a 5-6  Slept better with ear plugs, pleasant, social and bright  Received PRNS  No D/C date at this time

## 2020-08-24 NOTE — PROGRESS NOTES
Pt slept through the night without awakening  No sign of distress or labored breathing  Safety precautions maintained  Continuous visual monitoring performed throughout the night  Will continue to monitor

## 2020-08-24 NOTE — PROGRESS NOTES
The patient noted to be visible on the unit and in the milieu upon rounds throughout the shift  The patient noted to be pleasant and bright upon approach, social with peers, rates anxiety and depression 5-6/10 and states depression is improved from day previous  The patient denies si, hi, and hallucinations  The patient states that he slept well last hs  The patient noted to be compliant with meals and medications  The patient rates chronic bilateral back and knee pain 6/10, methadone administered as per physician's orders  The patient states medication effective in relief of pain  Will continue to monitor

## 2020-08-24 NOTE — PROGRESS NOTES
Progress Note - Savage Zamora 78 y o  male MRN: [id number]    Unit/Bed#: [facility] 201-01 Encounter: [id number]        Subjective:   Patient seen and examined at bedside after reviewing the chart and discussing the case with the caring staff  Patient examined at bedside  Patient reports that his epigastric pain has improved  Physical Exam   Vitals: Blood pressure 124/61, pulse 71, temperature (!) 97 4 °F (36 3 °C), temperature source Temporal, resp  rate 18, height 5' 10" (1 778 m), weight 78 7 kg (173 lb 6 4 oz), SpO2 100 %  ,Body mass index is 24 88 kg/m²  Constitutional: He appears well-developed  HEENT: PERR, EOMI, MMM  Cardiovascular: Normal rate and regular rhythm  Pulmonary/Chest: Effort normal and breath sounds normal    Abdomen: Soft, + BS, tender to palpation in epigastric region    Assessment/Plan:  Savage Zamora is a(n) 78y o  year old male with MDD     1  Asthma  Albuterol inhaler as needed  2  Chronic Pain Syndrome  Tylenol as needed for mild-moderate pains  Will restart Methadone liquid at 84mg once daily  3  Opiate Induced Constipation  Senokot-S PRN  4  Gait abnormality  Patient seems stable at this time  5  DJD/osteoarthritis  Patient may get Tylenol on as needed basis  6  Vitamin-D deficiency  I will start the patient on vitamin D2 weekly for 8 weeks and then vitamin D3 daily 1000 units daily  7  Epigastric pain/nausea  CT scan on 08/18/2020 was unable to exclude gastritis/gastric pathology  Continue Protonix and Carafate  Consult to GI has been placed patient wants to get is EGD on the day of discharge before going home

## 2020-08-25 PROCEDURE — 99232 SBSQ HOSP IP/OBS MODERATE 35: CPT | Performed by: PSYCHIATRY & NEUROLOGY

## 2020-08-25 RX ORDER — MIRTAZAPINE 15 MG/1
15 TABLET, FILM COATED ORAL
Status: DISCONTINUED | OUTPATIENT
Start: 2020-08-25 | End: 2020-08-26

## 2020-08-25 RX ADMIN — SUCRALFATE 1 G: 1 TABLET ORAL at 16:32

## 2020-08-25 RX ADMIN — SUCRALFATE 1 G: 1 TABLET ORAL at 12:40

## 2020-08-25 RX ADMIN — TRAZODONE HYDROCHLORIDE 50 MG: 50 TABLET ORAL at 20:36

## 2020-08-25 RX ADMIN — MIRTAZAPINE 15 MG: 15 TABLET, FILM COATED ORAL at 20:36

## 2020-08-25 RX ADMIN — GABAPENTIN 100 MG: 100 CAPSULE ORAL at 20:36

## 2020-08-25 RX ADMIN — METHADONE HYDROCHLORIDE 85 MG: 10 TABLET ORAL at 08:38

## 2020-08-25 RX ADMIN — PANTOPRAZOLE SODIUM 40 MG: 40 TABLET, DELAYED RELEASE ORAL at 16:32

## 2020-08-25 RX ADMIN — PANTOPRAZOLE SODIUM 40 MG: 40 TABLET, DELAYED RELEASE ORAL at 08:38

## 2020-08-25 RX ADMIN — GABAPENTIN 100 MG: 100 CAPSULE ORAL at 16:32

## 2020-08-25 RX ADMIN — SUCRALFATE 1 G: 1 TABLET ORAL at 20:36

## 2020-08-25 RX ADMIN — GABAPENTIN 100 MG: 100 CAPSULE ORAL at 08:38

## 2020-08-25 RX ADMIN — SUCRALFATE 1 G: 1 TABLET ORAL at 08:38

## 2020-08-25 NOTE — PROGRESS NOTES
Progress Note - Behavioral Health   Anne Shipman 78 y o  male MRN: [id number]  Unit/Bed#: [facility] 201-01 Encounter: [id number]    Assessment/Plan   Principal Problem:    MDD (major depressive disorder), recurrent episode, severe ([address] )  Active Problems:    H/O opioid abuse (HCC)    Asthma    Chronic pain    DJD (degenerative joint disease)    GERD (gastroesophageal reflux disease)    Vitamin D deficiency      Behavior over the last 24 hours:  Some improvement  Sleep: sleep on and off  Appetite: normal  Medication side effects: No  ROS: chronic pain, all other systems are negative for acute changes    Mental Status Evaluation:  Appearance:  age appropriate   Behavior:  cooperative   Speech:  normal volume   Mood:  decreased range   Affect:  mood-congruent   Thought Process:  goal directed   Thought Content:  somatically preoccupied   Perceptual Disturbances: None   Risk Potential: Suicidal Ideations none  Homicidal Ideations none  Potential for Aggression No   Sensorium:  person, place and time/date   Memory:  recent and remote memory grossly intact   Consciousness:  alert and awake    Attention: attention span and concentration were age appropriate   Insight:  limited   Judgment: fair   Gait/Station: normal gait/station   Motor Activity: no abnormal movements     Progress Toward Goals: Patient reports gradual reduction in depressed mood but continues anxious and somatically preoccupied about GI issues  His sleep has improved with the use of Trazodone p r n  Patient is willing to have a GI follow-up upon discharge  Recommended Treatment: Continue with group therapy, milieu therapy and occupational therapy  Risks, benefits and possible side effects of Medications:   Risks, benefits, and possible side effects of medications explained to patient and patient verbalizes understanding  Medications: all current active meds have been reviewed  Increase Remeron to 15 mg hs  Labs:  I have personally reviewed all pertinent laboratory/tests results  Most Recent Labs:   Lab Results   Component Value Date    WBC 8 21 08/18/2020    RBC 4 58 08/18/2020    HGB 14 9 08/18/2020    HCT 44 5 08/18/2020     08/18/2020    RDW 13 2 08/18/2020    NEUTROABS 4 51 08/18/2020    SODIUM 143 08/18/2020    K 3 9 08/18/2020     08/18/2020    CO2 27 08/18/2020    BUN 15 08/18/2020    CREATININE 1 24 08/18/2020    GLUC 92 08/18/2020    CALCIUM 8 9 08/18/2020    AST 40 08/18/2020    ALT 54 08/18/2020    ALKPHOS 72 08/18/2020    TP 7 0 08/18/2020    ALB 3 6 08/18/2020    TBILI 0 50 08/18/2020    [id number] 3 889 (H) 08/18/2020    FREET4 1 16 08/18/2020       Counseling / Coordination of Care  Total floor / unit time spent today 20 minutes  Greater than 50% of total time was spent with the patient and / or family counseling and / or coordination of care

## 2020-08-25 NOTE — PROGRESS NOTES
Social with peers and out in the community  Feels safe on unit     No suicidal ideations noted  Compliant with medications and snacks  No aspiration risks noted  Fluids at bedside to promote hydration  Maintained on q 7 minute checks  Will continue to monitor

## 2020-08-25 NOTE — PROGRESS NOTES
Progress Note - Rajni Naik 78 y o  male MRN: [id number]    Unit/Bed#: [facility] 201-01 Encounter: [id number]        Subjective:   Patient seen and examined at bedside after reviewing the chart and discussing the case with the caring staff  Patient examined at bedside  Patient reports that his epigastric pain has improved  Patient was evaluated by gastroenterology yesterday and patient has given his verbal consent to them for EGD which will be scheduled on the day of discharge from [facility]  Physical Exam   Vitals: Blood pressure 129/60, pulse 67, temperature 97 6 °F (36 4 °C), temperature source Temporal, resp  rate 18, height 5' 10" (1 778 m), weight 78 7 kg (173 lb 6 4 oz), SpO2 97 %  ,Body mass index is 24 88 kg/m²  Constitutional: He appears well-developed  HEENT: PERR, EOMI, MMM  Cardiovascular: Normal rate and regular rhythm  Pulmonary/Chest: Effort normal and breath sounds normal    Abdomen: Soft, + BS, tender to palpation in epigastric region    Assessment/Plan:  Rajni Naik is a(n) 78y o  year old male with MDD     1  Asthma  Albuterol inhaler as needed  2  Chronic Pain Syndrome  Tylenol as needed for mild-moderate pains  Will restart Methadone liquid at 84mg once daily  3  Opiate Induced Constipation  Senokot-S PRN  4  Gait abnormality  Patient seems stable at this time  5  DJD/osteoarthritis  Patient may get Tylenol on as needed basis  6  Vitamin-D deficiency  I will start the patient on vitamin D2 weekly for 8 weeks and then vitamin D3 daily 1000 units daily  7  Epigastric pain/nausea  CT scan on 08/18/2020 was unable to exclude gastritis/gastric pathology  Continue Protonix and Carafate  Melanie Brown PA-C GI has seen the patient,  patient needs to be scheduled for EGD on the day of discharge from [facility]

## 2020-08-25 NOTE — PROGRESS NOTES
[id number]  Patient visible in the community and is social with peers  He reports minimal depression and states his anxiety varies throughout the day but at the time is assessment, he rates it 7/10 and states it is controlled  He states he is currently worrying about discharge but knows it is being taken care of by SW    Requested trazodone with HS medications  No complaints of pain  He is compliant with medications  Will continue monitoring

## 2020-08-25 NOTE — PROGRESS NOTES
08/25/20 07   Activity/Group Checklist   Group Community meeting   Attendance Attended   Attendance Duration (min) Greater than 60   Interactions Interacted appropriately   Affect/Mood Appropriate   Goals Achieved Identified feelings; Able to listen to others; Able to engage in interactions; Able to recieve feedback

## 2020-08-25 NOTE — PROGRESS NOTES
08/25/20 1300   Activity/Group Checklist   Group Life Skills   Attendance Attended   Attendance Duration (min) 46-60   Interactions Interacted appropriately   Affect/Mood Appropriate   Goals Achieved Identified feelings; Discussed coping strategies; Able to listen to others; Able to engage in interactions; Able to reflect/comment on own behavior;Able to self-disclose; Able to recieve feedback

## 2020-08-25 NOTE — PROGRESS NOTES
08/25/20 0943   Team Meeting   Meeting Type Daily Rounds   Team Members Present   Team Members Present Physician;Nurse;;Occupational Therapist   Physician Team Member Dr Wendy Banks MD; Jolie Benitez[address]   Nursing Team Member Aaron Souza, RN   Care Management Team Member MS Leanna, [facility]   OT Team Member Bela Valladares [place]   Patient/Family Present   Patient Present No   Patient's Family Present No     PT had trazadone prn, slept, no behaviors, pleasant  Will D/C to outpatient EGD some time this week  Will follow up with [facility] and [facility] outpatient psych  Referral made for pcp

## 2020-08-25 NOTE — SOCIAL WORK
SANIYA spoke with [facility] with [facility], she indicated that PT is scheduled for next week, reviewed that consult had been done while PT here in hospital  [facility] indicated she would retrieve that information and follow up with SANIYA    [phone]

## 2020-08-25 NOTE — SOCIAL WORK
[facility]  A female answered phone and indicated that she could not speak that she would call back when she was done with PT care

## 2020-08-25 NOTE — SOCIAL WORK
CM sent message via Epic to schedule a follow up appointment for outpatient psych  -pending response

## 2020-08-25 NOTE — PROGRESS NOTES
traZODone (DESYREL) tablet 50 mg given at 2146 was effective for sleep  No noted suicidal ideations or homicidal behaviors  Fluids at bedside to promote hydration  No aspiration risks noted  Patient observed sleeping during most q 7 minute safety checks  No observable distress or changes in medical condition  No complaints of pain  Will continue to monitor

## 2020-08-25 NOTE — SOCIAL WORK
CM met with PT for PT check in  PT indicated that he would like to D/C direct to outpatient EGD  PT signed OMAR for [facility]  PT denies SI/HI/AH/VH reports that anxiety and depression are improving  Reviewed D/C planning and follow up supports, PT in agreement

## 2020-08-25 NOTE — PLAN OF CARE
PT progressing, potential D/C end of week early next week, will follow up with outpatient psych with Jakob Dash in [facility], PCP referral made, and EGD to be scheduled for time of D/C

## 2020-08-25 NOTE — PROGRESS NOTES
Patient was visible in milieu for breakfast and morning medication administration  Social with staff and peers  He states he slept well last night  Stated of pain in lower back and bilateral knees #5-6 and takes scheduled methadone for same  Rated depression #4 and anxiety #8  Denies SI,HI, or hallucinations  Medication compliant  Cooperative and pleasant  Q 7 minute safety checks maintained

## 2020-08-26 RX ORDER — TRAZODONE HYDROCHLORIDE 50 MG/1
50 TABLET ORAL
Status: DISCONTINUED | OUTPATIENT
Start: 2020-08-26 | End: 2020-09-02 | Stop reason: HOSPADM

## 2020-08-26 RX ADMIN — TRAZODONE HYDROCHLORIDE 50 MG: 50 TABLET ORAL at 21:07

## 2020-08-26 RX ADMIN — GABAPENTIN 100 MG: 100 CAPSULE ORAL at 15:47

## 2020-08-26 RX ADMIN — SUCRALFATE 1 G: 1 TABLET ORAL at 12:07

## 2020-08-26 RX ADMIN — METHADONE HYDROCHLORIDE 85 MG: 10 TABLET ORAL at 08:26

## 2020-08-26 RX ADMIN — SUCRALFATE 1 G: 1 TABLET ORAL at 21:07

## 2020-08-26 RX ADMIN — PANTOPRAZOLE SODIUM 40 MG: 40 TABLET, DELAYED RELEASE ORAL at 15:47

## 2020-08-26 RX ADMIN — GABAPENTIN 100 MG: 100 CAPSULE ORAL at 21:06

## 2020-08-26 RX ADMIN — SUCRALFATE 1 G: 1 TABLET ORAL at 17:30

## 2020-08-26 RX ADMIN — SUCRALFATE 1 G: 1 TABLET ORAL at 08:26

## 2020-08-26 RX ADMIN — PANTOPRAZOLE SODIUM 40 MG: 40 TABLET, DELAYED RELEASE ORAL at 08:26

## 2020-08-26 RX ADMIN — GABAPENTIN 100 MG: 100 CAPSULE ORAL at 08:26

## 2020-08-26 NOTE — DISCHARGE INSTR - APPOINTMENTS
The treatment team recommends ongoing medication management upon discharge  A referral has been made on your behalf to [place] [address] [facility] located at [place] [address], Phone: [phone]  Your intake appointment is scheduled for 10/13/2020 at 2:00pm with Kristal Silva [address]  Please plan to arrive 15 minutes prior to your scheduled appointment and bring your insurance card(s), photo ID  Your discharge will be faxed to this provider for continuity of care  The treatment team recommends follow up with a primary care physican upon discharge  A referral has been made on your behalf to [address] [place] [address] Primary Care located at [address] [place] [address], Phone: [phone]  Your intake appointment is scheduled for  9/9/2020 at 08:00am with Chari Felix [address]   Please plan to arrive 15 minutes prior to your scheduled appointment and bring your insurance card(s), photo ID  Your discharge will be faxed to this provider for continuity of care  The treatment team recommends follow up with the [facility] located at [place] [address], Phone: [phone] upon discharge  You may go to receive dosage between the hours 6am to 11am daily  Please request to speak with your therapist Elif Comer while on site and she will schedule session with you directly  Please plan to arrive 15 minutes prior to your scheduled appointment and bring your insurance card(s), photo ID  Your discharge will be faxed to this provider for continuity of care  The treatment team recommends follow up (for your EGD which will take place in the hospital day of discharge) with [facility] located at [place] [address], Phone: [phone]  You are scheduled for 10/6/2020 at 4pm  Please plan to arrive 15 minutes prior to your scheduled appointment and bring your insurance card(s), photo ID   Your discharge summary will be faxed to this provider for continuity of care

## 2020-08-26 NOTE — PLAN OF CARE
PT progressing will D/C on 9/2/2020, will follow up with outpatient psych and pcp referrals made  PT also will D C direct to consult appointment on 9/2 for EGD

## 2020-08-26 NOTE — SOCIAL WORK
CM spoke with  whom confirmed that appointment is a consult with [facility] and will be on 9/2/2020 at 2:40pm at the office location located at [address] [place] [address]  Call ended mutually  [phone]

## 2020-08-26 NOTE — SOCIAL WORK
CM received message back via epic that PT is scheduled for follow up [facility] MICKY Crespo on 10/13 at 2pm      CM placed call to [facility], and spoke with Mamadou Ho, notified of PT upcoming D/C  PT can walk in during clinic hours for administration  CM was directed to call PT therapist Eloy Gómez directly at [phone] to schedule therapy follow up  CM placed call to Eloy Gómez with [facility] , left voicemail requesting return call  CM placed call to [facility] for referral and schedule follow up  [phone]   PT is scheduled for follow up on 9/9 at 8am

## 2020-08-26 NOTE — PROGRESS NOTES
08/26/20 0946   Team Members Present   Team Members Present Physician;Nurse;;Occupational Therapist   Physician Team Member Dr RUSSO [facility], MD   Nursing Team Member Adriana Levy, RN   Care Management Team Member Cari Ram, [facility], [facility]   OT Team Member Ibrahima Cadena, SONG   Patient/Family Present   Patient Present No   Patient's Family Present No     PT happy with trazodone and not with Remeron  PT unable to D/C direct for EGD as the clinic does not have availability this week, PT to report to appointment next week in which they will schedule procedure from there, reports depression 5/10, social, pleasant  PT will follow [facility], pcp referral made  D/C date to be determined

## 2020-08-26 NOTE — DISCHARGE INSTR - OTHER ORDERS
You are being discharged to your home located at [address] [place], [place] [address], Phone: [phone]  We will walk you down to the short procedure unit to have your EGD completed in the hospital day of discharge through [facility] and from there you will go home after your procedure  Triggers you have identified during your hospitalization that led to your suicidal ideation, distressed mood, includes regression in physical and mental health, as well as ongoing grief  Coping skills you have identified during your hospitalization include puzzels, taking things apart and putting them back together, and riding a bike  If you are unable to deal with your distressed mood alone please contact your therapist Michael Domingo at [facility] at , your primary care provider at [facility] at , or your provider at [place] [address] [facility] at [phone]  If that is not effective and you continue to have (ex: suicidal ideation, homicidal ideation, distressed mood, overwhelmed, in crisis) please contact [facility] at 1 [phone], call 215, or go to the emergency room  BEHAVIORAL MEDICINE AT [address] Suicide Prevention Lifeline:  5-636.119.3759  *Alcohol Anonymous: 144.157.6068  *[place] [facility] & Alcohol Commission: (176) 978-5938  [address] [facility]  on [address] [facility] HELPLINE: 371.430.9468/Website: www lisa org  *Substance Abuse and [address] [facility]) [employer], which is a confidential, free, 24-hour-a-day, 365-day-a-year, information service for individuals and family members facing mental health and/or substance use disorders  This service provides referrals to local treatment facilities, support groups, and community-based organizations  Callers can also order free publications and other information    Call 4-330.648.9289/Website: www [facility] gov  *United [employer] 2-1-1: This is a toll free, confidential, 24-hour-a-day service which connects you to a community  in your area who can help you find services and resources that are available to you locally and provide critical services that can improve and save lives  Call: 80  /Website: https://[employer] net/      What you need to know aboutcoronavirus disease 2019 (COVID-19)     What is coronavirus disease 2019 (COVID-19)? Coronavirus disease 2019 (COVID-19) is a respiratory illness that can spread from person to person  The virus that causes COVID-19 is a novel coronavirus that was first identified during an investigation into an outbreak in [place], [place]  Can people in the [place]  get COVID-19? Yes  COVID-19 is spreading from person to person in parts of the [place]  Risk of infection with COVID-19 is higher for people who are close contacts of someone known to have COVID-19, for example healthcare workers, or household members  Other people at higher risk for infection are those who live in or have recently been in an area with ongoing spread of COVID-19  Learn more about places with ongoing spread at   PreviewBuy tn  html#geographic  Have there been cases of COVID-19 in the [place] ?   Yes  The first case of COVID-19 in the [place] was reported on January 21, 2020  The current count of cases of COVID-19 in the [place] is available on [employer] at [facility]  How does COVID-19 spread? The virus that causes COVID-19 probably emerged from an animal source, but is now spreading from person to person  The virus is thought to spread mainly between people who are in close contact with one another (within about 6 feet) through respiratory droplets produced when an infected person coughs or sneezes   It also may be possible that a person can get COVID-19 by touching a surface or object that has the virus on it and then touching their own mouth, nose, or possibly their eyes, but this is not thought to be the main way the virus spreads  Learn what is known about the spread of newly emerged coronaviruses at [facility] ch  What are the symptoms of COVID-19? Patients with COVID-19 have had mild to severe respiratory illness with symptoms of   fever   cough   shortness of breath    What are severe complications from this virus? Some patients have pneumonia in both lungs, multi-organ failure and in some cases death  How can I help protect myself? People can help protect themselves from respiratory illness with everyday preventive actions  Avoid close contact with people who are sick  Avoid touching your eyes, nose, and mouth withunwashed hands  Wash your hands often with soap and water for at least 20 seconds  Use an alcohol-based hand  that contains at least 60% alcohol if soap and water are not available  If you are sick, to keep from spreading respiratory illness to others, you should   Stay home when you are sick  Cover your cough or sneeze with a tissue, then throw the tissue in the trash  Clean and disinfect frequently touched objectsand surfaces  What should I do if I recently traveled from an area with ongoing spread of COVID-19? If you have traveled from an affected area, there may be restrictions on your movements for up to 2 weeks  If you develop symptoms during that period (fever, cough, trouble breathing), seek medical advice  Call the office of your health care provider before you go, and tell them about your travel and your symptoms  They will give you instructions on how to get care without exposing other people to your illness  While sick, avoid contact with people, don't go out and delay any travel to reduce the possibility of spreading illness to others  Is there a vaccine?    There is currently no vaccine to protect against COVID-19  The best way to prevent infection is to take everyday preventive actions, like avoiding close contact with people who are sick and washing your hands often  Is there a treatment? There is no specific antiviral treatment for COVID-19  People with COVID-19 can seek medical care to helprelieve symptoms  For more information: www cdc gov/MCXVM64RD 062434-A 03/03/2020            What to do if you are sick withcoronavirus disease 2019 (COVID-19)     If you are sick with COVID-19 or suspect you are infected with the virus that causes COVID-19, follow the steps below to help prevent the disease from spreading to people in your home and community  Stay home except to get medical care   You should restrict activities outside your home, except for getting medical care  Do not go to work, school, or public areas  Avoid using public transportation, ride-sharing, or taxis  Separate yourself from other people and animals inyour home  People: As much as possible, you should stay in a specific room and away from other people in your home  Also, you should use a separate bathroom, if available  Animals: Do not handle pets or other animals while sick  See COVID-19 and Animals for more information  Call ahead before visiting your doctor   If you have a medical appointment, call the healthcare provider and tell them that you have or may have COVID-19  This will help the healthcare provider's office take steps to keep other people from getting infected or exposed  Wear a facemask  You should wear a facemask when you are around other people (e g , sharing a room or vehicle) or pets and before you enter a healthcare provider's office  If you are not able to wear a facemask (for example, because it causes trouble breathing), then people who live with you should not stay in the same room with you, or they should wear a facemask if they enteryour room    Cover your coughs and sneezes   Cover your mouth and nose with a tissue when you cough or sneeze  Throw used tissues in a lined trash can; immediately wash your hands with soap and water for at least 20 seconds or clean your hands with an alcohol-based hand  that contains at least 60 to 95% alcohol, covering all surfaces of your hands and rubbing them together until they feel dry  Soap and water should be used preferentially if hands are visibly dirty  Avoid sharing personal household items   You should not share dishes, drinking glasses, cups, eating utensils, towels, or bedding with other people or pets in your home  After using these items, they should be washed thoroughly with soap and water  Clean your hands often  Wash your hands often with soap and water for at least 20 seconds  If soap and water are not available, clean your hands with an alcohol-based hand  that contains at least 60% alcohol, covering all surfaces of your hands and rubbing them together until they feel dry  Soap and water should be used preferentially if hands are visibly dirty  Avoid touching your eyes, nose, and mouth with unwashed hands  Clean all "high-touch" surfaces every day  High touch surfaces include counters, tabletops, doorknobs, bathroom fixtures, toilets, phones, keyboards, tablets, and bedside tables  Also, clean any surfaces that may have blood, stool, or body fluids on them  Use a household cleaning spray or wipe, according to the label instructions  Labels contain instructions for safe and effective use of the cleaning product including precautions you should take when applying the product, such as wearing gloves and making sure you have good ventilation during use of the product  Monitor your symptoms  Seek prompt medical attention if your illness is worsening (e g , difficulty breathing)  Before seeking care, call your healthcare provider and tell them that you have, or are being evaluated for, COVID-19  Put on a facemask before you enter the facility   These steps will help the healthcare provider's office to keep other people in the office or waiting room from getting infectedor exposed  Ask your healthcare provider to call the local or state health department  Persons who are placed under active monitoring or facilitated self-monitoring should follow instructions provided by their local health department or occupational health professionals, as appropriate  If you have a medical emergency and need to call 911, notify the dispatch personnel that you have, or are being evaluated for COVID-19  If possible, put on a facemask before emergency medical services arrive  Discontinuing home isolation  Patients with confirmed COVID-19 should remain under home isolation precautions until the risk of secondary transmission to others is thought to be low  The decision to discontinue home isolation precautions should be made on a case-by-case basis, in consultation with healthcare providers and state and local health departments  For more information: www cdc gov/IPEVW80MT 166242-C 02/24/2020           Stay home when you are sick,except to get medical care  Wash your hands often with soap and water for at least 20 seconds  Cover your cough or sneeze with a tissue, then throw the tissue in the trash  Clean and disinfect frequently touched objects and surfaces  Avoid touching your eyes, nose, and mouth  STOP THE SPREAD OF GERMS  For more information: www cdc gov/COVID19 Avoid close contact with people who are sick  Help prevent the spread of respiratory diseases like COVID-19

## 2020-08-26 NOTE — PROGRESS NOTES
Progress Note - Josi Fernandez 78 y o  male MRN: [id number]    Unit/Bed#: [facility] 201-01 Encounter: [id number]        Subjective:   Patient seen and examined at bedside after reviewing the chart and discussing the case with the caring staff  Patient examined at bedside  Patient reports that his epigastric pain has improved  Patient was evaluated by gastroenterology and patient has given his verbal consent to them for EGD which will be scheduled on the day of discharge from [facility]  Physical Exam   Vitals: Blood pressure 112/59, pulse 69, temperature 97 5 °F (36 4 °C), temperature source Temporal, resp  rate 18, height 5' 10" (1 778 m), weight 78 7 kg (173 lb 6 4 oz), SpO2 97 %  ,Body mass index is 24 88 kg/m²  Constitutional: He appears well-developed  HEENT: PERR, EOMI, MMM  Cardiovascular: Normal rate and regular rhythm  Pulmonary/Chest: Effort normal and breath sounds normal    Abdomen: Soft, + BS, tender to palpation in epigastric region    Assessment/Plan:  Josi Fernandez is a(n) 78y o  year old male with MDD     1  Asthma  Albuterol inhaler as needed  2  Chronic Pain Syndrome  Tylenol as needed for mild-moderate pains  Will restart Methadone liquid at 84mg once daily  3  Opiate Induced Constipation  Senokot-S PRN  4  Gait abnormality  Patient seems stable at this time  5  DJD/osteoarthritis  Patient may get Tylenol on as needed basis  6  Vitamin-D deficiency  I will start the patient on vitamin D2 weekly for 8 weeks and then vitamin D3 daily 1000 units daily  7  Epigastric pain/nausea  CT scan on 08/18/2020 was unable to exclude gastritis/gastric pathology  Continue Protonix and Carafate  Jovanny Garcia PA-C GI has seen the patient,  patient needs to be scheduled for EGD on the day of discharge from [facility]  The patient was discussed with Dr Leanne Johnson and he is in agreement with the above note

## 2020-08-26 NOTE — SOCIAL WORK
CM received call back from Cristobal Mendenhall with [facility] whom indicated they do not have time in their schedule this week to schedule PT and that PT is to report to his already scheduled appointment on 9/2/2020 at 2:40pm and from there they will schedule the actual procedure  [phone]

## 2020-08-26 NOTE — PROGRESS NOTES
Patient visible in milieu  Social with staff and peers  Stated of pain in back and bilateral knees stiffness  Rated pain #6 and states he takes methadone for the pain  Rated anxiety #7 but refused PRN when offered  Stated depression not too bad at 4-5  Denies SI,HI, or hallucinations  Attended group  Q 7 minute safety checks maintained

## 2020-08-26 NOTE — PLAN OF CARE
Problem: Ineffective Coping  Goal: Participates in unit activities  Description: Interventions:  - Provide therapeutic environment   - Provide required programming   - Redirect inappropriate behaviors   Outcome: Progressing   Patient has been bright and spontaneous in interaction and participation  Attended both AM RT groups; good concentration and attention displayed  Offered appropriate comments, revealing his own feelings/thoughts and plans, as well as suggestions to others

## 2020-08-27 PROCEDURE — 99232 SBSQ HOSP IP/OBS MODERATE 35: CPT | Performed by: PSYCHIATRY & NEUROLOGY

## 2020-08-27 RX ADMIN — SUCRALFATE 1 G: 1 TABLET ORAL at 12:04

## 2020-08-27 RX ADMIN — METHADONE HYDROCHLORIDE 85 MG: 10 TABLET ORAL at 08:44

## 2020-08-27 RX ADMIN — SUCRALFATE 1 G: 1 TABLET ORAL at 08:43

## 2020-08-27 RX ADMIN — GABAPENTIN 100 MG: 100 CAPSULE ORAL at 08:44

## 2020-08-27 RX ADMIN — TRAZODONE HYDROCHLORIDE 50 MG: 50 TABLET ORAL at 21:10

## 2020-08-27 RX ADMIN — PANTOPRAZOLE SODIUM 40 MG: 40 TABLET, DELAYED RELEASE ORAL at 08:45

## 2020-08-27 RX ADMIN — GABAPENTIN 100 MG: 100 CAPSULE ORAL at 16:45

## 2020-08-27 RX ADMIN — GABAPENTIN 100 MG: 100 CAPSULE ORAL at 21:10

## 2020-08-27 RX ADMIN — SUCRALFATE 1 G: 1 TABLET ORAL at 21:10

## 2020-08-27 RX ADMIN — SUCRALFATE 1 G: 1 TABLET ORAL at 16:45

## 2020-08-27 RX ADMIN — PANTOPRAZOLE SODIUM 40 MG: 40 TABLET, DELAYED RELEASE ORAL at 16:45

## 2020-08-27 NOTE — PROGRESS NOTES
08/27/20 1030   Activity/Group Checklist   Group Wellness   Attendance Attended   Attendance Duration (min) Greater than 60   Interactions Interacted appropriately   Affect/Mood Appropriate   Goals Achieved Identified feelings; Discussed coping strategies; Able to listen to others; Able to engage in interactions; Able to reflect/comment on own behavior;Able to self-disclose; Able to recieve feedback

## 2020-08-27 NOTE — PROGRESS NOTES
Pt present in milieu for groups and meals  Affect brightens upon approach  Mood is depressed  Unable to rate anxiety this morning  Rates depression 7/10  Feels improved overall  Denies SI/HI/AH/VH  Relief obtained from methadone this morning  No acute behaviors noted  Q 7 min checks maintained

## 2020-08-27 NOTE — PROGRESS NOTES
Progress Note - Josi Fernandez 78 y o  male MRN: [id number]    Unit/Bed#: [facility] 201-01 Encounter: [id number]        Subjective:   Patient seen and examined at bedside after reviewing the chart and discussing the case with the caring staff  Patient examined at bedside  Patient reports that his epigastric pain is much better    Patient has no acute concerns at this time  Patient was evaluated by gastroenterology and patient has given his verbal consent to them for EGD which will be scheduled on the day of discharge from [facility] (09/02/2020)  Patient is possible discharge for Wednesday, 09/02/2020  Physical Exam   Vitals: Blood pressure 128/59, pulse 75, temperature 98 2 °F (36 8 °C), temperature source Temporal, resp  rate 18, height 5' 10" (1 778 m), weight 78 7 kg (173 lb 6 4 oz), SpO2 99 %  ,Body mass index is 24 88 kg/m²  Constitutional: He appears well-developed  HEENT: PERR, EOMI, MMM  Cardiovascular: Normal rate and regular rhythm  Pulmonary/Chest: Effort normal and breath sounds normal    Abdomen: Soft, + BS, tender to palpation in epigastric region    Assessment/Plan:  Josi Fernandez is a(n) 78y o  year old male with MDD     1  Asthma  Albuterol inhaler as needed  2  Chronic Pain Syndrome  Tylenol as needed for mild-moderate pains  Will restart Methadone liquid at 84mg once daily  3  Opiate Induced Constipation  Senokot-S PRN  4  Gait abnormality  Patient seems stable at this time  5  DJD/osteoarthritis  Patient may get Tylenol on as needed basis  6  Vitamin-D deficiency  I will start the patient on vitamin D2 weekly for 8 weeks and then vitamin D3 daily 1000 units daily  7  Epigastric pain/nausea  CT scan on 08/18/2020 was unable to exclude gastritis/gastric pathology  Continue Protonix and Carafate  Jovanny Garcia PA-C GI has seen the patient,  patient needs to be scheduled for EGD on the day of discharge from [facility] (09/02/2020)      The patient was discussed with Dr Leanne Johnson and he is in agreement with the above note

## 2020-08-27 NOTE — PROGRESS NOTES
Progress Note - Behavioral Health   Rajni Naik 78 y o  male MRN: [id number]  Unit/Bed#: [facility]-01 Encounter: [id number]    Assessment/Plan   Principal Problem:    MDD (major depressive disorder), recurrent episode, severe ([facility] [address] )  Active Problems:    H/O opioid abuse (HCC)    Asthma    Chronic pain    DJD (degenerative joint disease)    GERD (gastroesophageal reflux disease)    Vitamin D deficiency      Behavior over the last 24 hours: Slowly improving  Sleep: normal  Appetite: normal  Medication side effects: No  ROS: no complaints, all other systems are negative for acute changes    Mental Status Evaluation:  Appearance:  age appropriate   Behavior:  cooperative   Speech:  normal volume   Mood:  depressed   Affect:  mood-congruent   Thought Process:  goal directed   Thought Content:  no overt delusions   Perceptual Disturbances: None   Risk Potential: Suicidal Ideations without plan  Homicidal Ideations none  Potential for Aggression No   Sensorium:  person, place and time/date   Memory:  recent and remote memory grossly intact   Consciousness:  alert and awake    Attention: attention span and concentration were age appropriate   Insight:  limited   Judgment: fair   Gait/Station: normal gait/station   Motor Activity: no abnormal movements     Progress Toward Goals: Patient reports gradual improvement with reduction of depressed mood and anxiety  He is somatically preoccupied about ongoing GI issue but feel relief after learning that his appointment was scheduled for next week  Patient has been compliant with medication and denies any current side effects  Recommended Treatment: Continue with group therapy, milieu therapy and occupational therapy  Risks, benefits and possible side effects of Medications:   Risks, benefits, and possible side effects of medications explained to patient and patient verbalizes understanding  Medications: all current active meds have been reviewed  Labs:  I have personally reviewed all pertinent laboratory/tests results  Most Recent Labs:   Lab Results   Component Value Date    WBC 8 21 08/18/2020    RBC 4 58 08/18/2020    HGB 14 9 08/18/2020    HCT 44 5 08/18/2020     08/18/2020    RDW 13 2 08/18/2020    NEUTROABS 4 51 08/18/2020    SODIUM 143 08/18/2020    K 3 9 08/18/2020     08/18/2020    CO2 27 08/18/2020    BUN 15 08/18/2020    CREATININE 1 24 08/18/2020    GLUC 92 08/18/2020    CALCIUM 8 9 08/18/2020    AST 40 08/18/2020    ALT 54 08/18/2020    ALKPHOS 72 08/18/2020    TP 7 0 08/18/2020    ALB 3 6 08/18/2020    TBILI 0 50 08/18/2020    NKQ2VTXDEKST 3 889 (H) 08/18/2020    FREET4 1 16 08/18/2020       Counseling / Coordination of Care  Total floor / unit time spent today 20 minutes  Greater than 50% of total time was spent with the patient and / or family counseling and / or coordination of care

## 2020-08-27 NOTE — SOCIAL WORK
CM placed return call to Matthew Bui with [facility]  She indicated that they can actually do the procedure on Wednesday of next week the 2nd in the hospital  We will D/C from our services and walk downstairs for procedure in which he will then leave hospital from home  Jyoti will fax team instructions for prep  Jyoti scheduled PT for follow up on 10/6/2020 at 4pm  No food after 8pm, no drink after midnight, any BP medication may be taken per Matthew Bui will have short procedure call CM day before with time ([phone])

## 2020-08-27 NOTE — PROGRESS NOTES
Patient appears to be sleeping without difficulty throughout the night  No behavioral issues  Will continue to monitor safety and behaviors every 7 minutes

## 2020-08-27 NOTE — PROGRESS NOTES
08/27/20 0788   Activity/Group Checklist   Group Community meeting   Attendance Attended   Attendance Duration (min) Greater than 60   Interactions Interacted appropriately   Affect/Mood Appropriate   Goals Achieved Identified feelings; Able to listen to others; Able to engage in interactions; Able to self-disclose; Able to recieve feedback

## 2020-08-27 NOTE — PROGRESS NOTES
Patient out in the milieu social with peers and staff  Ate HS snack and attended group  Upon approach patient's mood and affect is blunted and anxious  Rates anxiety 6-7/10 and depression 4-5/10  Denies SI/HI/hallucinations  Patient made no complaints of pain  Took medications without difficulty  Will continue to monitor safety and behaviors every 7 minutes

## 2020-08-28 PROCEDURE — 99232 SBSQ HOSP IP/OBS MODERATE 35: CPT | Performed by: HOSPITALIST

## 2020-08-28 RX ADMIN — GABAPENTIN 100 MG: 100 CAPSULE ORAL at 21:11

## 2020-08-28 RX ADMIN — GABAPENTIN 100 MG: 100 CAPSULE ORAL at 16:32

## 2020-08-28 RX ADMIN — SUCRALFATE 1 G: 1 TABLET ORAL at 12:30

## 2020-08-28 RX ADMIN — SUCRALFATE 1 G: 1 TABLET ORAL at 21:11

## 2020-08-28 RX ADMIN — PANTOPRAZOLE SODIUM 40 MG: 40 TABLET, DELAYED RELEASE ORAL at 06:16

## 2020-08-28 RX ADMIN — SUCRALFATE 1 G: 1 TABLET ORAL at 16:32

## 2020-08-28 RX ADMIN — SUCRALFATE 1 G: 1 TABLET ORAL at 08:34

## 2020-08-28 RX ADMIN — TRAZODONE HYDROCHLORIDE 50 MG: 50 TABLET ORAL at 21:11

## 2020-08-28 RX ADMIN — METHADONE HYDROCHLORIDE 85 MG: 10 TABLET ORAL at 08:35

## 2020-08-28 RX ADMIN — PANTOPRAZOLE SODIUM 40 MG: 40 TABLET, DELAYED RELEASE ORAL at 16:32

## 2020-08-28 RX ADMIN — ERGOCALCIFEROL 50000 UNITS: 1.25 CAPSULE, LIQUID FILLED ORAL at 08:45

## 2020-08-28 RX ADMIN — GABAPENTIN 100 MG: 100 CAPSULE ORAL at 08:34

## 2020-08-28 NOTE — PROGRESS NOTES
Progress Note - Moraima Fisher 78 y o  male MRN: [id number]    Unit/Bed#: [facility] 201-01 Encounter: [id number]        Subjective:   Patient seen and examined at bedside after reviewing the chart and discussing the case with the caring staff  Patient examined at bedside  Patient complains of pain in his left heel from pins" that he has  Patient states that the pain is made worse by not being able to wear supportive shoes  Patient was evaluated by gastroenterology and patient has given his verbal consent to them for EGD which will be scheduled on the day of discharge from [facility] (09/02/2020)  Patient is possible discharge for Wednesday, 09/02/2020  Physical Exam   Vitals: Blood pressure 110/63, pulse 63, temperature 97 7 °F (36 5 °C), temperature source Temporal, resp  rate 18, height 5' 10" (1 778 m), weight 78 7 kg (173 lb 6 4 oz), SpO2 97 %  ,Body mass index is 24 88 kg/m²  Constitutional: He appears well-developed  HEENT: PERR, EOMI, MMM  Cardiovascular: Normal rate and regular rhythm  Pulmonary/Chest: Effort normal and breath sounds normal    Abdomen: Soft, + BS, tender to palpation in epigastric region    Assessment/Plan:  Moraima Fisher is a(n) 78y o  year old male with MDD     1  Asthma  Albuterol inhaler as needed  2  Chronic Pain Syndrome  Tylenol as needed for mild-moderate pains  Will restart Methadone liquid at 84mg once daily  Encouraged patient to continue using PRN Tylenol and resting and elevating his foot for heel pain  3  Opiate Induced Constipation  Senokot-S PRN  4  Gait abnormality  Patient seems stable at this time  5  DJD/osteoarthritis  Patient may get Tylenol on as needed basis  6  Vitamin-D deficiency  I will start the patient on vitamin D2 weekly for 8 weeks and then vitamin D3 daily 1000 units daily  7  Epigastric pain/nausea  CT scan on 08/18/2020 was unable to exclude gastritis/gastric pathology  Continue Protonix and Carafate   Eileen Boyce PA-C GI has seen the patient,  patient is scheduled for EGD on the day of discharge from [facility] (09/02/2020)  The patient was discussed with Dr Ariana Burgos and he is in agreement with the above note

## 2020-08-28 NOTE — PROGRESS NOTES
Progress Note - Behavioral Health     Jami Davey 78 y o  male MRN: [id number]   Unit/Bed#: [facility] 201-01 Encounter: [id number]    Behavior over the last 24 hours: improving  Makayla Graves seen today, appears less depressed and reports he feels better He states he was given a date for EGD and this is a relief for him as he struggles with GI issues on a regular basis  Reports feeling more hopeful and less anxious  Denies any suicidal homicidal ideations  There is no evidence of psychosis  Reports compliance with medication and denies any side effects  Staff report pleasant in groups and on the unit  Sleep: improved  Appetite: normal  Medication side effects:  Denied  ROS: no complaints, all other systems are negative    Mental Status Evaluation:    Appearance:  age appropriate, casually dressed, adequate grooming   Behavior:  pleasant   Speech:  normal rate, normal volume   Mood:  euthymic   Affect:  slightly brighter, more appropriate   Thought Process:  organized   Associations: intact associations   Thought Content:  normal   Perceptual Disturbances: none   Risk Potential: Suicidal ideation - None  Homicidal ideation - None   Sensorium:  oriented to person, place and time/date   Memory:  recent and remote memory grossly intact   Consciousness:  alert and awake   Attention: attention span and concentration are age appropriate   Insight:  age appropriate   Judgment: age appropriate   Gait/Station: normal gait/station   Motor Activity: no abnormal movements     Vital signs in last 24 hours:    Temp:  [97 7 °F (36 5 °C)-99 6 °F (37 6 °C)] 97 7 °F (36 5 °C)  HR:  [61-66] 63  Resp:  [18-20] 18  BP: (109-118)/(59-70) 110/63    Laboratory results: I have personally reviewed all pertinent laboratory/tests results          Assessment/Plan   Principal Problem:    MDD (major depressive disorder), recurrent episode, severe (HCC)  Active Problems:    H/O opioid abuse (HCC)    Asthma    Chronic pain    DJD (degenerative joint disease)    GERD (gastroesophageal reflux disease)    Vitamin D deficiency    Recommended Treatment:     Planned medication and treatment changes:  Continue gabapentin 100 mg 3 times a day  Continue methadone 85 mg daily  Continue trazodone 50 mg daily for sleep  All current active medications have been reviewed  Encourage group therapy, milieu therapy and occupational therapy  Behavioral Health checks every 7 minutes  Current Facility-Administered Medications   Medication Dose Route Frequency Provider Last Rate    acetaminophen  650 mg Oral Q6H PRN Jolie Eli, CRNP      acetaminophen  650 mg Oral Q4H PRN Jolie Eli, CRNP      acetaminophen  975 mg Oral Q6H PRN Jolie Eli, CRNP      albuterol  2 puff Inhalation Q4H PRN Shaheed Turk MD      aluminum-magnesium hydroxide-simethicone  30 mL Oral Q4H PRN MICKY Toro      [START ON 9/11/2020] cholecalciferol  1,000 Units Oral Daily Shaheed Turk MD      ergocalciferol  50,000 Units Oral Weekly Shaheed Turk MD      gabapentin  100 mg Oral TID Zeina Carroll MD      hydrOXYzine HCL  25 mg Oral Q4H PRN Jolie Eli, CRNP      hydrOXYzine HCL  50 mg Oral Q6H PRN Jolie Eli, CRNP      methadone  85 mg Oral Daily Jolie Eli, CRNP      OLANZapine  5 mg Intramuscular Q6H PRN Jolie Eli, CRNP      OLANZapine  5 mg Oral Q8H PRN Jolie Eli, CRNP      ondansetron  4 mg Oral Q6H PRN Jolie Eli, CRNP      pantoprazole  40 mg Oral BID AC Shaheed Turk MD      polyethylene glycol  17 g Oral Daily PRN Jolie Eli, SAGENP      senna-docusate sodium  1 tablet Oral Daily PRN Jolie Eli, CRNP      sucralfate  1 g Oral 4x Daily Shaheed Turk MD      traZODone  50 mg Oral HS PRN Jolie Benitez, MICKY      traZODone  50 mg Oral HS Zeina Carroll MD         Risks / Benefits of Treatment:    Risks, benefits, and possible side effects of medications explained to patient and patient verbalizes understanding and agreement for treatment      Counseling / Coordination of Care:    Total floor / unit time spent today 20 minutes  Greater than 50% of total time was spent with the patient and / or family counseling and / or coordination of care  A description of counseling / coordination of care:  Patient's progress discussed with staff in treatment team meeting  Medications, treatment progress and treatment plan reviewed with patient      Jace Estes MD 08/28/20

## 2020-08-28 NOTE — SOCIAL WORK
CM received voicemail from [facility] with [facility]  CM returned call reviewing D/C plan and follow up care, left message requesting return call

## 2020-08-28 NOTE — PROGRESS NOTES
No noted suicidal ideations or homicidal behaviors  Fluids at bedside to promote hydration  No aspiration risks noted  Patient observed sleeping during most q 7 minute safety checks  No observable distress or changes in medical condition  No complaints of pain  Will continue to monitor

## 2020-08-28 NOTE — PLAN OF CARE
Problem: Ineffective Coping  Goal: Participates in unit activities  Description: Interventions:  - Provide therapeutic environment   - Provide required programming   - Redirect inappropriate behaviors   Outcome: Progressing     Problem: Risk for Self Injury/Neglect  Goal: Attend and participate in unit activities, including therapeutic, recreational, and educational groups  Description: Interventions:  - Provide therapeutic and educational activities daily, encourage attendance and participation, and document same in the medical record  - Obtain collateral information, encourage visitation and family involvement in care   Outcome: Progressing

## 2020-08-28 NOTE — PROGRESS NOTES
Very pleasant during assessment tonight  Withdrawn to room mostly, reading  Talked at length about his GI issues  Requested and received Trazodone 50 mg at HS  At present it appears effective  No suicidal or homicidal ideations currently  Safety maintained via clutter-free environment, ID band, and given non-skid socks  No change in medical condition  Compliant with medications and snacks  Hygiene is good  Maintained on q 7 minute checks  Will continue to monitor

## 2020-08-28 NOTE — PROGRESS NOTES
08/28/20 0775   Activity/Group Checklist   Group Community meeting   Attendance Attended   Attendance Duration (min) 46-60   Interactions Interacted appropriately   Affect/Mood Appropriate;Normal range   Goals Achieved Identified feelings; Able to listen to others; Able to engage in interactions; Able to self-disclose

## 2020-08-28 NOTE — PROGRESS NOTES
Pt present in milieu for groups and meals  Compliant with medications  Pt rates anxiety 5/10 and depression 4/10 today  Denies SI/HI/AH/VH  Pt rates back, knee, and heel pain 6/10 this morning  Received scheduled methadone  No acute behaviors noted  Q 7 min checks maintained

## 2020-08-28 NOTE — PROGRESS NOTES
08/28/20 1030   Activity/Group Checklist   Group Wellness  (WRAP PLAN)   Attendance Attended   Attendance Duration (min) Greater than 60   Interactions Interacted appropriately   Affect/Mood Appropriate;Bright;Normal range;Calm   Goals Achieved Identified feelings; Identified triggers; Identified relapse prevention strategies; Discussed coping strategies; Discussed discharge plans; Increased hopefulness;Verbalized increased hopefulness; Able to reflect/comment on own behavior;Able to engage in interactions; Able to listen to others

## 2020-08-28 NOTE — SOCIAL WORK
CM met with PT and reviewed change in plans for D/C that he will D/C from unit and go down to short procedure on Wednesday and have his egd completed with "[employer] [place] [facility], PT was very happy to hear this and grateful for accommodation  PT denies SI/HI/AH/VH, and reports anxiety and depression are improving  PT in agreement to follow up with [facility] and outpatient psych in [facility] with Alis Johnson as well as with referral made for PCP

## 2020-08-28 NOTE — PROGRESS NOTES
08/28/20 0929   Team Meeting   Meeting Type Daily Rounds   Team Members Present   Team Members Present Physician;Nurse;   Physician Team Member Dr Adrien Landaverde MD; Alissa Acosta, [address] [place] [address]   Nursing Team Member Omero Kay, SONG   Care Management Team Member MS Leanna, [facility], [facility]   OT Team Member GILL Jones   Patient/Family Present   Patient Present No   Patient's Family Present No   PT has been pleasant, cooperative, social, attending group, continues on dose of methadone  Will D/C direct to EGD procedure on Wednesday, will follow up with [facility] outpatient [place], follow up with [facility]  Referral made for pcp

## 2020-08-29 PROCEDURE — 99232 SBSQ HOSP IP/OBS MODERATE 35: CPT | Performed by: PSYCHIATRY & NEUROLOGY

## 2020-08-29 RX ORDER — DIAPER,BRIEF,INFANT-TODD,DISP
EACH MISCELLANEOUS 4 TIMES DAILY PRN
Status: DISCONTINUED | OUTPATIENT
Start: 2020-08-29 | End: 2020-09-02 | Stop reason: HOSPADM

## 2020-08-29 RX ADMIN — SUCRALFATE 1 G: 1 TABLET ORAL at 08:42

## 2020-08-29 RX ADMIN — SUCRALFATE 1 G: 1 TABLET ORAL at 21:15

## 2020-08-29 RX ADMIN — SUCRALFATE 1 G: 1 TABLET ORAL at 12:03

## 2020-08-29 RX ADMIN — GABAPENTIN 100 MG: 100 CAPSULE ORAL at 21:15

## 2020-08-29 RX ADMIN — SUCRALFATE 1 G: 1 TABLET ORAL at 17:01

## 2020-08-29 RX ADMIN — TRAZODONE HYDROCHLORIDE 50 MG: 50 TABLET ORAL at 21:15

## 2020-08-29 RX ADMIN — HYDROCORTISONE: 10 CREAM TOPICAL at 13:22

## 2020-08-29 RX ADMIN — PANTOPRAZOLE SODIUM 40 MG: 40 TABLET, DELAYED RELEASE ORAL at 15:47

## 2020-08-29 RX ADMIN — PANTOPRAZOLE SODIUM 40 MG: 40 TABLET, DELAYED RELEASE ORAL at 08:42

## 2020-08-29 RX ADMIN — GABAPENTIN 100 MG: 100 CAPSULE ORAL at 08:43

## 2020-08-29 RX ADMIN — METHADONE HYDROCHLORIDE 85 MG: 10 TABLET ORAL at 08:42

## 2020-08-29 RX ADMIN — GABAPENTIN 100 MG: 100 CAPSULE ORAL at 15:47

## 2020-08-29 NOTE — PROGRESS NOTES
Pt c/o depression 4/10 & anxiety 6/10  Pt is pleasant & cooperative  Q 7 min checks maintained to monitor pt's behavior & safety

## 2020-08-29 NOTE — PROGRESS NOTES
Progress Note - Behavioral Health     Rizwana Osborne 78 y o  male MRN: [id number]   Unit/Bed#: [facility] 201-01 Encounter: [id number]    Behavior over the last 24 hours: some improvement  Ioana Ochoa seen in office  States he is feeling better with GI meds  States his skin is no longer burning and is stomach is not in knots  Continues to report anxiety and difficulty coping with anxiety  Says his anxiety has been worse since last winter  Mood is better  Sleep: improved  Appetite: normal  Medication side effects: denies  ROS: reports rash from soap which medical staff has addressed  States he gets mildly dizzy if he turns his head too fast  Denies chest pain, abdominal pain  Mental Status Evaluation:    Appearance:  casually dressed, adequate grooming   Behavior:  pleasant, cooperative   Speech:  normal rate and volume   Mood:  anxious   Affect:  normal range and intensity   Thought Process:  goal directed   Associations: intact associations   Thought Content:  ruminations   Perceptual Disturbances: none   Risk Potential: Suicidal ideation - None  Homicidal ideation - None   Sensorium:  oriented to person, place and time/date   Memory:  recent and remote memory grossly intact   Consciousness:  alert and awake   Attention: attention span and concentration are age appropriate   Insight:  good   Judgment: good   Gait/Station: normal gait/station   Motor Activity: no abnormal movements     Vital signs in last 24 hours:    Temp:  [97 8 °F (36 6 °C)-98 7 °F (37 1 °C)] 97 8 °F (36 6 °C)  HR:  [63-79] 79  Resp:  [18-20] 18  BP: (112-120)/(61-74) 120/74    Laboratory results: I have personally reviewed all pertinent laboratory/tests results          Assessment/Plan   Principal Problem:    MDD (major depressive disorder), recurrent episode, severe (HCC)  Active Problems:    H/O opioid abuse (HCC)    Asthma    Chronic pain    DJD (degenerative joint disease)    GERD (gastroesophageal reflux disease)    Vitamin D deficiency    Recommended Treatment:     Planned medication and treatment changes: All current active medications have been reviewed  Encourage group therapy, milieu therapy and occupational therapy  Behavioral Health checks every 7 minutes  Current Facility-Administered Medications   Medication Dose Route Frequency Provider Last Rate    acetaminophen  650 mg Oral Q6H PRN Mario Nalini, CRNP      acetaminophen  650 mg Oral Q4H PRN Mario Gayle, CRNP      acetaminophen  975 mg Oral Q6H PRN Mario Gayle, CRNP      albuterol  2 puff Inhalation Q4H PRN Laura Azul MD      aluminum-magnesium hydroxide-simethicone  30 mL Oral Q4H PRN Mario Gayle, CRNP      [START ON 9/11/2020] cholecalciferol  1,000 Units Oral Daily Laura Azul MD      ergocalciferol  50,000 Units Oral Weekly Laura Azul MD      gabapentin  100 mg Oral TID Miguel Busby MD      hydrocortisone   Topical 4x Daily PRN Shon Hester PA-C      hydrOXYzine HCL  25 mg Oral Q4H PRN Mario Gayle, CRNP      hydrOXYzine HCL  50 mg Oral Q6H PRN Mario Gayle, CRNP      methadone  85 mg Oral Daily Mario Nalini, CRNP      OLANZapine  5 mg Intramuscular Q6H PRN Mario Gayle, CRNP      OLANZapine  5 mg Oral Q8H PRN Mario Gayle, CRNP      ondansetron  4 mg Oral Q6H PRN Mario Gayle, CRNP      pantoprazole  40 mg Oral BID AC Laura Azul MD      polyethylene glycol  17 g Oral Daily PRN Mario Gayle, CRNP      senna-docusate sodium  1 tablet Oral Daily PRN Mario Gayle, CRNP      sucralfate  1 g Oral 4x Daily Laura Azul MD      traZODone  50 mg Oral HS PRN Mario Gayle, CRNP      traZODone  50 mg Oral HS Miguel Busby MD         Risks / Benefits of Treatment:    Risks, benefits, and possible side effects of medications explained to patient and patient verbalizes understanding and agreement for treatment  Counseling / Coordination of Care: Total floor / unit time spent today 15 minutes   Greater than 50% of total time was spent with the patient and / or family counseling and / or coordination of care  A description of counseling / coordination of care:  Patient's progress discussed with staff in treatment team meeting  Medications, treatment progress and treatment plan reviewed with patient      Joo Askew PA-C 08/29/20

## 2020-08-29 NOTE — PROGRESS NOTES
Patient out in the milieu social with peers and staff  Ate HS snack and attended group  Upon approach patient's mood and affect is appropriate and euthymic  Endorses depression and anxiety rating average  Calm and cooperative  Denies  pain/SI/HI/hallucinations  Took medications without difficulty  No behavioral issues  No complaints or concerns  Will continue to monitor safety and behaviors every 7 minutes

## 2020-08-29 NOTE — PROGRESS NOTES
Patient visible in milieu  He is social with staff and peers  Attended group  C/O rash and itchiness to top of bilateral feet and lower extremities  Medical PA made aware and PRN hydrocortisone ordered  Stated of pain in back and bilateral knees  Denied need for PRN pain medication  Rated depression #4 and anxiety #7  Denies SI,HI, or hallucinations  Patient is calm and cooperative  Medication compliant  Q 7 minute safety checks maintained

## 2020-08-29 NOTE — PROGRESS NOTES
Progress Note - Savage Zamora 78 y o  male MRN: [id number]    Unit/Bed#: [facility] 201-01 Encounter: [id number]        Subjective:   Patient seen and examined at bedside after reviewing the chart and discussing the case with the caring staff  Patient examined at bedside  Patient complains of an itchy rash on his bilateral feet and lower extremities  Patient states that he had a similar rash the last time that he was admitted to our unit and he believes it is from the laundry detergent  Patient states that at home he uses clear, fragrance-free laundry detergent for his sensitive skin  Patient is requesting topical hydrocortisone cream for his feet and legs  Patient is possible discharge for Wednesday, 09/02/2020  Physical Exam   Vitals: Blood pressure 120/74, pulse 79, temperature 97 8 °F (36 6 °C), temperature source Temporal, resp  rate 18, height 5' 10" (1 778 m), weight 78 7 kg (173 lb 6 4 oz), SpO2 99 %  ,Body mass index is 24 88 kg/m²  Constitutional: He appears well-developed  HEENT: PERR, EOMI, MMM  Cardiovascular: Normal rate and regular rhythm  Pulmonary/Chest: Effort normal and breath sounds normal    Abdomen: Soft, + BS, tender to palpation in epigastric region  Skin:  Faint patches of erythema with excoriations on the dorsum of bilateral feet and anterior lower extremities with clear margins over areas where socks are  Assessment/Plan:  Savage Zamora is a(n) 78y o  year old male with MDD     1  Asthma  Seems stable at this time  Albuterol inhaler as needed  2  Chronic Pain Syndrome  Tylenol as needed for mild-moderate pains  Will restart Methadone liquid at 84mg once daily  Encouraged patient to continue using PRN Tylenol and resting and elevating his foot for heel pain  3  Opiate Induced Constipation  Senokot-S PRN  4  Gait abnormality  Patient seems stable at this time  5  DJD/osteoarthritis  Patient may get Tylenol on as needed basis  6  Vitamin-D deficiency   I will start the patient on vitamin D2 weekly for 8 weeks and then vitamin D3 daily 1000 units daily  7  Epigastric pain/nausea  CT scan on 08/18/2020 was unable to exclude gastritis/gastric pathology  Continue Protonix and Carafate  OTONIEL Sanches has seen the patient, patient is scheduled for EGD on the day of discharge from [facility] (09/02/2020)  8  Allergic dermatitis  Patient's dermatitis is localized to his feet and lower extremities and seems to be from the socks he has been using from our unit  I will start patient on hydrocortisone 1% cream to be applied 4 times daily as needed  Will monitor patient's rash  The patient was discussed with Dr Beverly Garcia and he is in agreement with the above note

## 2020-08-30 PROCEDURE — 99232 SBSQ HOSP IP/OBS MODERATE 35: CPT | Performed by: PSYCHIATRY & NEUROLOGY

## 2020-08-30 RX ADMIN — GABAPENTIN 100 MG: 100 CAPSULE ORAL at 08:34

## 2020-08-30 RX ADMIN — SUCRALFATE 1 G: 1 TABLET ORAL at 17:02

## 2020-08-30 RX ADMIN — SUCRALFATE 1 G: 1 TABLET ORAL at 12:03

## 2020-08-30 RX ADMIN — PANTOPRAZOLE SODIUM 40 MG: 40 TABLET, DELAYED RELEASE ORAL at 06:41

## 2020-08-30 RX ADMIN — TRAZODONE HYDROCHLORIDE 50 MG: 50 TABLET ORAL at 22:00

## 2020-08-30 RX ADMIN — PANTOPRAZOLE SODIUM 40 MG: 40 TABLET, DELAYED RELEASE ORAL at 15:57

## 2020-08-30 RX ADMIN — METHADONE HYDROCHLORIDE 85 MG: 10 TABLET ORAL at 08:33

## 2020-08-30 RX ADMIN — GABAPENTIN 100 MG: 100 CAPSULE ORAL at 22:00

## 2020-08-30 RX ADMIN — GABAPENTIN 100 MG: 100 CAPSULE ORAL at 15:57

## 2020-08-30 RX ADMIN — SUCRALFATE 1 G: 1 TABLET ORAL at 22:00

## 2020-08-30 RX ADMIN — SUCRALFATE 1 G: 1 TABLET ORAL at 08:34

## 2020-08-30 RX ADMIN — HYDROCORTISONE: 10 CREAM TOPICAL at 13:07

## 2020-08-30 NOTE — PROGRESS NOTES
Pt slept through the night without awakening  No sign of distress or labored breathing  Continuous visual checks performed the night q7 minutes  Safety precautions maintained  Will continue to monitor

## 2020-08-30 NOTE — PROGRESS NOTES
Progress Note - Rizwana Osborne 78 y o  male MRN: [id number]    Unit/Bed#: [facility] 201[facility]01 Encounter: [id number]        Subjective:   Patient seen and examined at bedside after reviewing the chart and discussing the case with the caring staff  Patient examined at bedside  Patient reports that the rash on his feet and legs has improved with hydrocortisone cream   Patient reports that the rash does continue up his legs and he thinks that is due to the detergent they used to wash his clothes  Patient is possible discharge for Wednesday, 09/02/2020  Physical Exam   Vitals: Blood pressure 127/60, pulse 67, temperature 97 5 °F (36 4 °C), temperature source Temporal, resp  rate 18, height 5' 10" (1 778 m), weight 81 7 kg (180 lb 3 2 oz), SpO2 99 %  ,Body mass index is 25 86 kg/m²  Constitutional: He appears well-developed  HEENT: PERR, EOMI, MMM  Cardiovascular: Normal rate and regular rhythm  Pulmonary/Chest: Effort normal and breath sounds normal    Abdomen: Soft, + BS, tender to palpation in epigastric region  Skin:  Faint patches of erythema with excoriations on the dorsum of bilateral feet and lower extremities  Assessment/Plan:  Rizwana Osborne is a(n) 78y o  year old male with MDD     1  Asthma  Seems stable at this time  Albuterol inhaler as needed  2  Chronic Pain Syndrome  Tylenol as needed for mild-moderate pains  Will restart Methadone liquid at 84mg once daily  Encouraged patient to continue using PRN Tylenol and resting and elevating his foot for heel pain  3  Opiate Induced Constipation  Senokot-S PRN  4  Gait abnormality  Patient seems stable at this time  5  DJD/osteoarthritis  Patient may get Tylenol on as needed basis  6  Vitamin-D deficiency  I will start the patient on vitamin D2 weekly for 8 weeks and then vitamin D3 daily 1000 units daily  7  Epigastric pain/nausea  CT scan on 08/18/2020 was unable to exclude gastritis/gastric pathology  Continue Protonix and Carafate   Bertha Line OTONIEL Perez has seen the patient, patient is scheduled for EGD on the day of discharge from [address] [place] [address] (09/02/2020)  8  Allergic dermatitis  Patient's dermatitis is localized to his feet and lower extremities and seems to be in areas that are in contact with his clothing that has been washed on the unit  Continue hydrocortisone 1% cream to be applied 4 times daily as needed and monitor patient's rash  The patient was discussed with Dr Catie Cerna and he is in agreement with the above note

## 2020-08-30 NOTE — PLAN OF CARE
Problem: Depression  Goal: Treatment Goal: Demonstrate behavioral control of depressive symptoms, verbalize feelings of improved mood/affect, and adopt new coping skills prior to discharge  Outcome: Progressing  Goal: Verbalize thoughts and feelings  Description: Interventions:  - Assess and re-assess patient's level of risk   - Engage patient in 1:1 interactions, daily, for a minimum of 15 minutes   - Encourage patient to express feelings, fears, frustrations, hopes   Outcome: Progressing  Goal: Refrain from harming self  Description: Interventions:  - Monitor patient closely, per order   - Supervise medication ingestion, monitor effects and side effects   Outcome: Progressing  Goal: Refrain from isolation  Description: Interventions:  - Develop a trusting relationship   - Encourage socialization   Outcome: Progressing  Goal: Attend and participate in unit activities, including therapeutic, recreational, and educational groups  Description: Interventions:  - Provide therapeutic and educational activities daily, encourage attendance and participation, and document same in the medical record   Outcome: Progressing  Goal: Complete daily ADLs, including personal hygiene independently, as able  Description: Interventions:  - Observe, teach, and assist patient with ADLS  -  Monitor and promote a balance of rest/activity, with adequate nutrition and elimination   Outcome: Progressing

## 2020-08-30 NOTE — PROGRESS NOTES
Progress Note - Behavioral Health     Tiana Whitmore 78 y o  male MRN: [id number]   Unit/Bed#: [facility]01 Encounter: [id number]    Behavior over the last 24 hours: unchanged  Odilon Sepulveda reports anxiety about discharge  Thinking a lot of "everything I have to do"  No other concerns or complaints  Sleep: normal  Appetite: normal  Medication side effects: denies  ROS: dry skin; generalized pruritis, rash top of feet - medicine aware    Mental Status Evaluation:    Appearance:  dressed appropriately, adequate grooming   Behavior:  cooperative   Speech:  normal rate and volume   Mood:  anxious   Affect:  tense   Thought Process:  goal directed   Associations: intact associations   Thought Content:  ruminations   Perceptual Disturbances: none   Risk Potential: Suicidal ideation - None  Homicidal ideation - None   Sensorium:  oriented to person, place and time/date   Memory:  recent and remote memory grossly intact   Consciousness:  alert and awake   Attention: attention span and concentration are age appropriate   Insight:  good   Judgment: good   Gait/Station: normal gait/station   Motor Activity: no abnormal movements     Vital signs in last 24 hours:    Temp:  [97 5 °F (36 4 °C)-97 7 °F (36 5 °C)] 97 5 °F (36 4 °C)  HR:  [64-77] 67  Resp:  [18-20] 18  BP: (101-127)/(60-72) 127/60    Laboratory results: I have personally reviewed all pertinent laboratory/tests results  Assessment/Plan   Principal Problem:    MDD (major depressive disorder), recurrent episode, severe (HCC)  Active Problems:    H/O opioid abuse (HCC)    Asthma    Chronic pain    DJD (degenerative joint disease)    GERD (gastroesophageal reflux disease)    Vitamin D deficiency    Recommended Treatment: No med change  Continue present treatment  Planned medication and treatment changes:     All current active medications have been reviewed  Encourage group therapy, milieu therapy and occupational therapy  Behavioral Health checks every 7 minutes  Current Facility-Administered Medications   Medication Dose Route Frequency Provider Last Rate    acetaminophen  650 mg Oral Q6H PRN [place] Piper, CRNP      acetaminophen  650 mg Oral Q4H PRN Ashanti Mayer, CRNP      acetaminophen  975 mg Oral Q6H PRN [place] Mayer, CRNP      albuterol  2 puff Inhalation Q4H PRN Shen Boswell MD      aluminum-magnesium hydroxide-simethicone  30 mL Oral Q4H PRN Ashanti Piper, CRSAMIA      [START ON 9/11/2020] cholecalciferol  1,000 Units Oral Daily Shen Boswell MD      ergocalciferol  50,000 Units Oral Weekly Shen Boswell MD      gabapentin  100 mg Oral TID Gumaro Larios MD      hydrocortisone   Topical 4x Daily PRN Lisa Cox PA-C      hydrOXYzine HCL  25 mg Oral Q4H PRN Ashanti Mayer, CRNP      hydrOXYzine HCL  50 mg Oral Q6H PRN [place] Mayer, CRNP      methadone  85 mg Oral Daily [place] Mayer, CRNP      OLANZapine  5 mg Intramuscular Q6H PRN Ashantia Mayer, CRNP      OLANZapine  5 mg Oral Q8H PRN [place] Mayer, CRNP      ondansetron  4 mg Oral Q6H PRN [place] Mayer, CRNP      pantoprazole  40 mg Oral BID AC Shen Boswell MD      polyethylene glycol  17 g Oral Daily PRN Ashanti Mayer, CRNP      senna-docusate sodium  1 tablet Oral Daily PRN [place] Mayer, CRNP      sucralfate  1 g Oral 4x Daily Shen Boswell MD      traZODone  50 mg Oral HS PRN [place] Mayer, MICKY      traZODone  50 mg Oral HS Gumaro Larios MD         Risks / Benefits of Treatment:    Risks, benefits, and possible side effects of medications explained to patient and patient verbalizes understanding and agreement for treatment  Counseling / Coordination of Care: Total floor / unit time spent today 15 minutes  Greater than 50% of total time was spent with the patient and / or family counseling and / or coordination of care  A description of counseling / coordination of care:  Patient's progress discussed with staff in treatment team meeting    Medications, treatment progress and treatment plan reviewed with patient      Papi Pi, OTONIEL 08/30/20

## 2020-08-30 NOTE — PROGRESS NOTES
Pt was present in his room, pleasant and cooperative  Pt talked  to RN about how he was feeling and stated he wasn't anxious or depressed and was feeling much better  Pt was upbeat and optimistic about his pending discharge next week and expressed concern only over how his transportation would be arranged after getting EGD performed  Pt was upbeat and optimistic  Pt denied hallucinations and pain  Safety precautions maintained  Continuous visual checks performed q7 minutes throughout the shift  Will continue to monitor

## 2020-08-30 NOTE — PROGRESS NOTES
Patient has been visible in milieu  He is pleasant and cooperative  Rated anxiety #5 and depression #3  Stated of pain in back and knees  Denied need for PRN pain medication  Denies SI,HI, or hallucinations  Continues with rash to top of bilateral feet and at times area itches  Denies need for PRN hydrocortisone  Aware to ask for hydrocortisone if needed  Q 7 minute safety checks maintained

## 2020-08-31 PROCEDURE — 99232 SBSQ HOSP IP/OBS MODERATE 35: CPT | Performed by: PSYCHIATRY & NEUROLOGY

## 2020-08-31 RX ORDER — BUSPIRONE HYDROCHLORIDE 15 MG/1
7.5 TABLET ORAL 2 TIMES DAILY
Status: DISCONTINUED | OUTPATIENT
Start: 2020-08-31 | End: 2020-09-01

## 2020-08-31 RX ORDER — ALBUTEROL SULFATE 90 UG/1
2 AEROSOL, METERED RESPIRATORY (INHALATION) EVERY 4 HOURS PRN
Qty: 1 INHALER | Refills: 0 | Status: SHIPPED | OUTPATIENT
Start: 2020-08-31

## 2020-08-31 RX ORDER — DIAPER,BRIEF,INFANT-TODD,DISP
EACH MISCELLANEOUS 4 TIMES DAILY PRN
Qty: 30 G | Refills: 0 | Status: SHIPPED | OUTPATIENT
Start: 2020-08-31

## 2020-08-31 RX ORDER — POLYETHYLENE GLYCOL 3350 17 G/17G
17 POWDER, FOR SOLUTION ORAL DAILY PRN
Qty: 30 PACKET | Refills: 0 | Status: SHIPPED | OUTPATIENT
Start: 2020-08-31

## 2020-08-31 RX ORDER — GABAPENTIN 100 MG/1
100 CAPSULE ORAL 3 TIMES DAILY
Qty: 90 CAPSULE | Refills: 0 | Status: SHIPPED | OUTPATIENT
Start: 2020-08-31 | End: 2021-10-06 | Stop reason: DRUGHIGH

## 2020-08-31 RX ORDER — ERGOCALCIFEROL 1.25 MG/1
50000 CAPSULE ORAL WEEKLY
Qty: 4 CAPSULE | Refills: 0 | Status: SHIPPED | OUTPATIENT
Start: 2020-09-04 | End: 2020-10-10

## 2020-08-31 RX ORDER — MELATONIN
1000 DAILY
Qty: 30 TABLET | Refills: 0 | Status: SHIPPED | OUTPATIENT
Start: 2020-09-11

## 2020-08-31 RX ORDER — AMOXICILLIN 250 MG
1 CAPSULE ORAL DAILY PRN
Qty: 30 TABLET | Refills: 0 | Status: SHIPPED | OUTPATIENT
Start: 2020-08-31

## 2020-08-31 RX ORDER — ONDANSETRON 4 MG/1
4 TABLET, ORALLY DISINTEGRATING ORAL EVERY 6 HOURS PRN
Qty: 30 TABLET | Refills: 0 | Status: SHIPPED | OUTPATIENT
Start: 2020-08-31 | End: 2022-06-02 | Stop reason: SDUPTHER

## 2020-08-31 RX ORDER — SUCRALFATE 1 G/1
1 TABLET ORAL 4 TIMES DAILY
Qty: 120 TABLET | Refills: 0 | Status: SHIPPED | OUTPATIENT
Start: 2020-08-31

## 2020-08-31 RX ORDER — PANTOPRAZOLE SODIUM 40 MG/1
40 TABLET, DELAYED RELEASE ORAL
Qty: 60 TABLET | Refills: 0 | Status: SHIPPED | OUTPATIENT
Start: 2020-08-31

## 2020-08-31 RX ORDER — METHADONE HYDROCHLORIDE 10 MG/5ML
84 SOLUTION ORAL DAILY
Qty: 420 ML | Refills: 0 | Status: SHIPPED | OUTPATIENT
Start: 2020-08-31 | End: 2020-09-10

## 2020-08-31 RX ADMIN — SUCRALFATE 1 G: 1 TABLET ORAL at 17:24

## 2020-08-31 RX ADMIN — GABAPENTIN 100 MG: 100 CAPSULE ORAL at 08:05

## 2020-08-31 RX ADMIN — BUSPIRONE HYDROCHLORIDE 7.5 MG: 15 TABLET ORAL at 17:24

## 2020-08-31 RX ADMIN — PANTOPRAZOLE SODIUM 40 MG: 40 TABLET, DELAYED RELEASE ORAL at 06:10

## 2020-08-31 RX ADMIN — SUCRALFATE 1 G: 1 TABLET ORAL at 21:58

## 2020-08-31 RX ADMIN — SUCRALFATE 1 G: 1 TABLET ORAL at 08:06

## 2020-08-31 RX ADMIN — PANTOPRAZOLE SODIUM 40 MG: 40 TABLET, DELAYED RELEASE ORAL at 16:08

## 2020-08-31 RX ADMIN — SUCRALFATE 1 G: 1 TABLET ORAL at 11:36

## 2020-08-31 RX ADMIN — TRAZODONE HYDROCHLORIDE 50 MG: 50 TABLET ORAL at 21:58

## 2020-08-31 RX ADMIN — HYDROCORTISONE: 10 CREAM TOPICAL at 15:21

## 2020-08-31 RX ADMIN — GABAPENTIN 100 MG: 100 CAPSULE ORAL at 21:58

## 2020-08-31 RX ADMIN — METHADONE HYDROCHLORIDE 85 MG: 10 TABLET ORAL at 08:05

## 2020-08-31 RX ADMIN — GABAPENTIN 100 MG: 100 CAPSULE ORAL at 16:08

## 2020-08-31 NOTE — PLAN OF CARE
Problem: Ineffective Coping  Goal: Cooperates with admission process  Description: Interventions:   - Complete admission process  Outcome: Progressing  Goal: Identifies ineffective coping skills  Outcome: Progressing  Goal: Identifies healthy coping skills  Outcome: Progressing  Goal: Demonstrates healthy coping skills  Outcome: Progressing  Goal: Participates in unit activities  Description: Interventions:  - Provide therapeutic environment   - Provide required programming   - Redirect inappropriate behaviors   Outcome: Progressing  Goal: Patient/Family participate in treatment and DC plans  Description: Interventions:  - Provide therapeutic environment  Outcome: Progressing  Goal: Patient/Family verbalizes awareness of resources  Outcome: Progressing  Goal: Understands least restrictive measures  Description: Interventions:  - Utilize least restrictive behavior  Outcome: Progressing  Goal: Free from restraint events  Description: - Utilize least restrictive measures   - Provide behavioral interventions   - Redirect inappropriate behaviors   Outcome: Progressing     Problem: Risk for Self Injury/Neglect  Goal: Treatment Goal: Remain safe during length of stay, learn and adopt new coping skills, and be free of self-injurious ideation, impulses and acts at the time of discharge  Outcome: Progressing  Goal: Verbalize thoughts and feelings  Description: Interventions:  - Assess and re-assess patient's lethality and potential for self-injury  - Engage patient in 1:1 interactions, daily, for a minimum of 15 minutes  - Encourage patient to express feelings, fears, frustrations, hopes  - Establish rapport/trust with patient   Outcome: Progressing  Goal: Refrain from harming self  Description: Interventions:  - Monitor patient closely, per order  - Develop a trusting relationship  - Supervise medication ingestion, monitor effects and side effects   Outcome: Progressing  Goal: Attend and participate in unit activities, including therapeutic, recreational, and educational groups  Description: Interventions:  - Provide therapeutic and educational activities daily, encourage attendance and participation, and document same in the medical record  - Obtain collateral information, encourage visitation and family involvement in care   Outcome: Progressing  Goal: Recognize maladaptive responses and adopt new coping mechanisms  Outcome: Progressing  Goal: Complete daily ADLs, including personal hygiene independently, as able  Description: Interventions:  - Observe, teach, and assist patient with ADLS  - Monitor and promote a balance of rest/activity, with adequate nutrition and elimination  Outcome: Progressing     Problem: Depression  Goal: Treatment Goal: Demonstrate behavioral control of depressive symptoms, verbalize feelings of improved mood/affect, and adopt new coping skills prior to discharge  Outcome: Progressing  Goal: Verbalize thoughts and feelings  Description: Interventions:  - Assess and re-assess patient's level of risk   - Engage patient in 1:1 interactions, daily, for a minimum of 15 minutes   - Encourage patient to express feelings, fears, frustrations, hopes   Outcome: Progressing  Goal: Refrain from harming self  Description: Interventions:  - Monitor patient closely, per order   - Supervise medication ingestion, monitor effects and side effects   Outcome: Progressing  Goal: Refrain from isolation  Description: Interventions:  - Develop a trusting relationship   - Encourage socialization   Outcome: Progressing  Goal: Refrain from self-neglect  Outcome: Progressing  Goal: Attend and participate in unit activities, including therapeutic, recreational, and educational groups  Description: Interventions:  - Provide therapeutic and educational activities daily, encourage attendance and participation, and document same in the medical record   Outcome: Progressing  Goal: Complete daily ADLs, including personal hygiene independently, as able  Description: Interventions:  - Observe, teach, and assist patient with ADLS  -  Monitor and promote a balance of rest/activity, with adequate nutrition and elimination   Outcome: Progressing     Problem: Anxiety  Goal: Anxiety is at manageable level  Description: Interventions:  - Assess and monitor patient's anxiety level  - Monitor for signs and symptoms (heart palpitations, chest pain, shortness of breath, headaches, nausea, feeling jumpy, restlessness, irritable, apprehensive)  - Collaborate with interdisciplinary team and initiate plan and interventions as ordered    - [place] patient to unit/surroundings  - Explain treatment plan  - Encourage participation in care  - Encourage verbalization of concerns/fears  - Identify coping mechanisms  - Assist in developing anxiety-reducing skills  - Administer/offer alternative therapies  - Limit or eliminate stimulants  Outcome: Progressing

## 2020-08-31 NOTE — PROGRESS NOTES
Pt was present in the milieu watching movies and socializing with other patients  Pt was pleasant, cooperative and upbeat  Pt stated he had no anxiety because he's been laughing all day watching movies but he is concerned after discharge that his anxiety will present again and he won't be medicated for it  Pt encouraged to share his fears with provider on Monday  Pt verbalized his depression has reduced to nothing and the only thing he is concerned about is whether the electricity has been turned off in his house when he leaves  Pt appeared well kempt with good hygiene  Pt denies hallucinations or pain  Continuous visual checks performed throughout the shift q7 min  Safety precautions maintained  Will continue to monitor

## 2020-08-31 NOTE — PROGRESS NOTES
Progress Note - Behavioral Health   Claude Gerber 78 y o  male MRN: [id number]  Unit/Bed#: [facility] Encounter: [id number]    Assessment/Plan   Principal Problem:    MDD (major depressive disorder), recurrent episode, severe ([facility] [address] )  Active Problems:    H/O opioid abuse (HCC)    Asthma    Chronic pain    DJD (degenerative joint disease)    GERD (gastroesophageal reflux disease)    Vitamin D deficiency      Behavior over the last 24 hours: Some improvement  Sleep: slept better  Appetite: normal  Medication side effects: No  ROS: no complaints, all other systems are negative for acute changes    Mental Status Evaluation:  Appearance:  age appropriate   Behavior:  cooperative   Speech:  normal volume   Mood:  anxious   Affect:  mood-congruent   Thought Process:  goal directed   Thought Content:  no overt delusions   Perceptual Disturbances: None   Risk Potential: Suicidal Ideations none  Homicidal Ideations none  Potential for Aggression No   Sensorium:  person, place and time/date   Memory:  recent and remote memory grossly intact   Consciousness:  alert and awake    Attention: attention span and concentration were age appropriate   Insight:  fair   Judgment: fair   Gait/Station: normal gait/station   Motor Activity: no abnormal movements     Progress Toward Goals:  Patient reports reduced depressed mood but continues anxious and preoccupied about his coming GI study  He admits ongoing rumination but denies any current suicidal ideation or intention to hurt himself  Patient has been compliant with medication and denies any current side effects  Recommended Treatment: Continue with group therapy, milieu therapy and occupational therapy  Risks, benefits and possible side effects of Medications:   Risks, benefits, and possible side effects of medications explained to patient and patient verbalizes understanding  Medications: all current active meds have been reviewed  Begin Buspar 7 5 mg tid  Labs:  I have personally reviewed all pertinent laboratory/tests results  Most Recent Labs:   Lab Results   Component Value Date    WBC 8 21 08/18/2020    RBC 4 58 08/18/2020    HGB 14 9 08/18/2020    HCT 44 5 08/18/2020     08/18/2020    RDW 13 2 08/18/2020    NEUTROABS 4 51 08/18/2020    SODIUM 143 08/18/2020    K 3 9 08/18/2020     08/18/2020    CO2 27 08/18/2020    BUN 15 08/18/2020    CREATININE 1 24 08/18/2020    GLUC 92 08/18/2020    CALCIUM 8 9 08/18/2020    AST 40 08/18/2020    ALT 54 08/18/2020    ALKPHOS 72 08/18/2020    TP 7 0 08/18/2020    ALB 3 6 08/18/2020    TBILI 0 50 08/18/2020    [id number] 3 889 (H) 08/18/2020    FREET4 1 16 08/18/2020       Counseling / Coordination of Care  Total floor / unit time spent today 25 minutes  Greater than 50% of total time was spent with the patient and / or family counseling and / or coordination of care

## 2020-08-31 NOTE — PROGRESS NOTES
08/31/20 1030   Activity/Group Checklist   Group Wellness   Attendance Attended   Attendance Duration (min) Greater than 60   Interactions Interacted appropriately   Affect/Mood Appropriate   Goals Achieved Identified feelings; Discussed coping strategies; Able to listen to others; Able to engage in interactions; Able to reflect/comment on own behavior;Able to self-disclose; Able to recieve feedback

## 2020-08-31 NOTE — PROGRESS NOTES
08/31/20 0773   Activity/Group Checklist   Group Community meeting   Attendance Attended   Attendance Duration (min) Greater than 60   Interactions Interacted appropriately   Affect/Mood Appropriate   Goals Achieved Identified feelings; Able to listen to others; Able to engage in interactions; Able to self-disclose; Able to recieve feedback

## 2020-08-31 NOTE — PROGRESS NOTES
Pt slept through the night without awakening  No sign of distress or labored breathing  Continuous visual safety checks performed throughout the night q7 minutes  Safety precautions maintained  Will continue to monitor

## 2020-08-31 NOTE — PROGRESS NOTES
Progress Note - Irish Miller 78 y o  male MRN: [id number]    Unit/Bed#: [facility] 201-01 Encounter: [id number]        Subjective:   Patient seen and examined at bedside after reviewing the chart and discussing the case with the caring staff  Patient examined at bedside  Patient has no acute issues  Patient is possible discharge for Wednesday, 09/02/2020  Patient request all his prescriptions  I reviewed and reconciled patient's problem list and medications  Patient is scheduled to receive EGD on 09/02/2020 as an outpatient on the day of his discharge  Physical Exam   Vitals: Blood pressure 113/57, pulse 72, temperature 97 6 °F (36 4 °C), temperature source Temporal, resp  rate 18, height 5' 10" (1 778 m), weight 81 7 kg (180 lb 3 2 oz), SpO2 96 %  ,Body mass index is 25 86 kg/m²  Constitutional: He appears well-developed  HEENT: PERR, EOMI, MMM  Cardiovascular: Normal rate and regular rhythm  Pulmonary/Chest: Effort normal and breath sounds normal    Abdomen: Soft, + BS, tender to palpation in epigastric region  Skin:  Faint patches of erythema with excoriations on the dorsum of bilateral feet and lower extremities  Assessment/Plan:  Irish Miller is a(n) 78y o  year old male with MDD     MEDICAL CLEARANCE  Patient is medically cleared for discharge  All scripts have been written for the patient  1  Asthma  Seems stable at this time  Albuterol inhaler as needed  2  Chronic Pain Syndrome  Tylenol as needed for mild-moderate pains  Will restart Methadone liquid at 84mg once daily  Encouraged patient to continue using PRN Tylenol and resting and elevating his foot for heel pain  3  Opiate Induced Constipation  Senokot-S PRN  4  Gait abnormality  Patient seems stable at this time  5  DJD/osteoarthritis  Patient may get Tylenol on as needed basis  6  Vitamin-D deficiency  I will start the patient on vitamin D2 weekly for 8 weeks and then vitamin D3 daily 1000 units daily    7  Epigastric pain/nausea  CT scan on 08/18/2020 was unable to exclude gastritis/gastric pathology  Continue Protonix and Carafate  Elana Vu PA-C GI has seen the patient, patient is scheduled for EGD on the day of discharge [facility] (09/02/2020)  8  Allergic dermatitis  Patient's dermatitis is localized to his feet and lower extremities and seems to be in areas that are in contact with his clothing that has been washed on the unit  Continue hydrocortisone 1% cream to be applied 4 times daily as needed and monitor patient's rash

## 2020-08-31 NOTE — PROGRESS NOTES
Patient visible in milieu, pleasant and cooperative in interaction  Social with staff and peers  Patient rates anxiety/depression at 5/10, denies SI/HI, hallucinations  Affect appropriate to circumstances  Attends groups as scheduled  Remains medication compliant and on 7" checks for safety and behaviors

## 2020-08-31 NOTE — PROGRESS NOTES
08/31/20 0939   Team Meeting   Meeting Type Daily Rounds   Team Members Present   Team Members Present Physician;Nurse;; Occupational Therapist   Physician Team Member Dr Sae Coley MD; MICKY Shirley   Nursing Team Member Daron Camarillo RN   Social Work Team Member Tiffanie Nathan [facility]   OT Team Member Shilo Junghmann [place]   Patient/Family Present   Patient Present No   Patient's Family Present No     Slept last night; social with peers; dep/anx "ok"; worried about dc [place] transport; pt dcing to [facility] [place] for EGD

## 2020-08-31 NOTE — PROGRESS NOTES
Last BM documented to be on 8/28, then 8/25  Miralax and senokot ordered PRN, may benefit from scheduled bowel regimen  Per RN, no present suicidal ideation, fingerfoods/no silverware not indicated at this time  Continue with diet as ordered  No supplementation indicated at this time

## 2020-09-01 PROCEDURE — 99232 SBSQ HOSP IP/OBS MODERATE 35: CPT | Performed by: PSYCHIATRY & NEUROLOGY

## 2020-09-01 RX ORDER — BUSPIRONE HYDROCHLORIDE 5 MG/1
10 TABLET ORAL 2 TIMES DAILY
Status: DISCONTINUED | OUTPATIENT
Start: 2020-09-01 | End: 2020-09-02 | Stop reason: HOSPADM

## 2020-09-01 RX ORDER — TRAZODONE HYDROCHLORIDE 50 MG/1
50 TABLET ORAL
Qty: 30 TABLET | Refills: 1 | Status: SHIPPED | OUTPATIENT
Start: 2020-09-01 | End: 2020-09-24

## 2020-09-01 RX ORDER — BUSPIRONE HYDROCHLORIDE 10 MG/1
10 TABLET ORAL 2 TIMES DAILY
Qty: 90 TABLET | Refills: 1 | Status: ON HOLD | OUTPATIENT
Start: 2020-09-01 | End: 2021-10-14 | Stop reason: SDUPTHER

## 2020-09-01 RX ADMIN — GABAPENTIN 100 MG: 100 CAPSULE ORAL at 08:19

## 2020-09-01 RX ADMIN — TRAZODONE HYDROCHLORIDE 50 MG: 50 TABLET ORAL at 21:01

## 2020-09-01 RX ADMIN — PANTOPRAZOLE SODIUM 40 MG: 40 TABLET, DELAYED RELEASE ORAL at 06:02

## 2020-09-01 RX ADMIN — GABAPENTIN 100 MG: 100 CAPSULE ORAL at 17:13

## 2020-09-01 RX ADMIN — PANTOPRAZOLE SODIUM 40 MG: 40 TABLET, DELAYED RELEASE ORAL at 17:13

## 2020-09-01 RX ADMIN — SUCRALFATE 1 G: 1 TABLET ORAL at 21:01

## 2020-09-01 RX ADMIN — METHADONE HYDROCHLORIDE 85 MG: 10 TABLET ORAL at 08:19

## 2020-09-01 RX ADMIN — GABAPENTIN 100 MG: 100 CAPSULE ORAL at 21:00

## 2020-09-01 RX ADMIN — SUCRALFATE 1 G: 1 TABLET ORAL at 17:12

## 2020-09-01 RX ADMIN — SUCRALFATE 1 G: 1 TABLET ORAL at 12:14

## 2020-09-01 RX ADMIN — BUSPIRONE HYDROCHLORIDE 10 MG: 5 TABLET ORAL at 17:13

## 2020-09-01 RX ADMIN — BUSPIRONE HYDROCHLORIDE 7.5 MG: 15 TABLET ORAL at 08:19

## 2020-09-01 RX ADMIN — SUCRALFATE 1 G: 1 TABLET ORAL at 08:19

## 2020-09-01 NOTE — PROGRESS NOTES
Pt present in milieu for groups sad meals  Affect is bright and mood is anxious  Pt rates anxiety 8/10 and depression 3/10  Denies SI/HI/AH/VH  Pt complains of left heel pain and back pain 7/10, methadone relieved pain  Pt was upset about another peer getting in his face last night  Pt reassured and validated  Educated pt with instructions to prepare for EGD GI procedure tomorrow afternoon  Pt verbalized understanding  No acute behaviors noted  Q 7 min checks maintained

## 2020-09-01 NOTE — SOCIAL WORK
CM met with PT for PT check in  PT reported that his friend Myles Yeager will be able to pick him up tomorrow from his short procedure for EGD and take him for medications there after and home  PT denies SI/HI/AH/VH, anxiety and depression  PT expressed his appreciation for being able to get the EGD tomorrow before going home  Reviewed with PT that he will be going to short procedure tomorrow vonnie  Early afternoon  CM reviewed with PT IMM< PT declined right to appeal, feels he is ready, PT signed IMM

## 2020-09-01 NOTE — PROGRESS NOTES
Pt was present in the milieu during group, participated in unit activities and compliant with meds and meals  Pt was social with staff and peers, stating he feels better and he still has concerns about his discharge but realizes he needs to focus on what he can control and not worry about everything outside of it  Pt discussed his concern about anxiety post discharge but thinks he can manage it better  Pt is brighter and more social  Pt denies hallucinations, reported 5/10 anxiety, denies depression and pain  Continuous visual safety checks performed q7 minutes through the shift  Safety precautions maintained  Will continue to monitor

## 2020-09-01 NOTE — PROGRESS NOTES
09/01/20 0908   Team Meeting   Meeting Type Daily Rounds   Team Members Present   Team Members Present Physician;Nurse;;Occupational Therapist   Physician Team Member Dr Matthew Vann MD; Mora Ivey[address]   Nursing Team Member Martha Simpson, SONG   Care Management Team Member Kathy Calvert MS, [facility], [facility]   OT Team Member Alicia Garza, [place]   Patient/Family Present   Patient Present No   Patient's Family Present No   PT to D/C tomorrow to short procedure unit in hospital for EGD, slept, pleasant, and cooperative, medication compliant, NPO after midnight  PT to follow up with [address] [place] [address] outpatient psych, and referral was made for PCP with [facility], and he has a follow up [facility]

## 2020-09-01 NOTE — PROGRESS NOTES
09/01/20 0730   Activity/Group Checklist   Group Community meeting   Attendance Attended   Attendance Duration (min) 46-60  (Pt joined grp late)   Interactions Interacted appropriately   Affect/Mood Appropriate   Goals Achieved Identified feelings; Able to listen to others; Able to engage in interactions; Able to self-disclose; Able to recieve feedback

## 2020-09-01 NOTE — PROGRESS NOTES
Pt slept through the night without awakening  No sign of distress or labored breathing  Continuous visual checks performed q7 minutes throughout the night  Safety precautions maintained  Will continue to monitor

## 2020-09-01 NOTE — PROGRESS NOTES
Progress Note - Behavioral Health   Rajni Naik 78 y o  male MRN: [id number]  Unit/Bed#: [facility] Encounter: [id number]    Assessment/Plan   Principal Problem:    MDD (major depressive disorder), recurrent episode, severe ([address] )  Active Problems:    H/O opioid abuse (HCC)    Asthma    Chronic pain    DJD (degenerative joint disease)    GERD (gastroesophageal reflux disease)    Vitamin D deficiency      Behavior over the last 24 hours:  improved  Sleep: normal  Appetite: normal  Medication side effects: No  ROS: chornic pain, all other systems are negative for acute changes    Mental Status Evaluation:  Appearance:  age appropriate   Behavior:  cooperative   Speech:  normal volume   Mood:  anxious   Affect:  mood-congruent   Thought Process:  goal directed   Thought Content:  no overt delusions   Perceptual Disturbances: None   Risk Potential: Suicidal Ideations none  Homicidal Ideations none  Potential for Aggression No   Sensorium:  person, place and time/date   Memory:  recent and remote memory grossly intact   Consciousness:  alert and awake    Attention: attention span and concentration were age appropriate   Insight:  fair   Judgment: fair   Gait/Station: normal gait/station   Motor Activity: no abnormal movements     Progress Toward Goals: Patient reports reduced depressed mood, improved sleep, appetite and good participation in group and milieu  He admits feeling anxious about his endoscopy study following his discharge tomorrow  Medication education and discharge plan was discussed  He is in agreement to follow up his outpatient treatment regularly  Recommended Treatment: Continue with group therapy, milieu therapy and occupational therapy  Risks, benefits and possible side effects of Medications:   Risks, benefits, and possible side effects of medications explained to patient and patient verbalizes understanding  Medications: all current active meds have been reviewed    Increased Buspar to 10 mg tid  Labs: I have personally reviewed all pertinent laboratory/tests results  Most Recent Labs:   Lab Results   Component Value Date    WBC 8 21 08/18/2020    RBC 4 58 08/18/2020    HGB 14 9 08/18/2020    HCT 44 5 08/18/2020     08/18/2020    RDW 13 2 08/18/2020    NEUTROABS 4 51 08/18/2020    SODIUM 143 08/18/2020    K 3 9 08/18/2020     08/18/2020    CO2 27 08/18/2020    BUN 15 08/18/2020    CREATININE 1 24 08/18/2020    GLUC 92 08/18/2020    CALCIUM 8 9 08/18/2020    AST 40 08/18/2020    ALT 54 08/18/2020    ALKPHOS 72 08/18/2020    TP 7 0 08/18/2020    ALB 3 6 08/18/2020    TBILI 0 50 08/18/2020    IPU0EXEXHDTB 3 889 (H) 08/18/2020    FREET4 1 16 08/18/2020       Counseling / Coordination of Care  Total floor / unit time spent today 25 minutes  Greater than 50% of total time was spent with the patient and / or family counseling and / or coordination of care  A description of the counseling / coordination of care: medication education, supportive therapy discharged plan was discussed

## 2020-09-01 NOTE — PROGRESS NOTES
Progress Note - Rajni Naik 78 y o  male MRN: [id number]    Unit/Bed#: [facility] 201-01 Encounter: [id number]        Subjective:   Patient seen and examined at bedside after reviewing the chart and discussing the case with the caring staff  Patient examined at bedside  Patient has no acute issues  Patient is possible discharge for Wednesday, 09/02/2020  Patient request all his prescriptions  I reviewed and reconciled patient's problem list and medications  Patient is scheduled to receive EGD on 09/02/2020 as an outpatient on the day of his discharge  Physical Exam   Vitals: Blood pressure 117/55, pulse 59, temperature 98 2 °F (36 8 °C), temperature source Temporal, resp  rate 18, height 5' 10" (1 778 m), weight 81 7 kg (180 lb 3 2 oz), SpO2 99 %  ,Body mass index is 25 86 kg/m²  Constitutional: He appears well-developed  HEENT: PERR, EOMI, MMM  Cardiovascular: Normal rate and regular rhythm  Pulmonary/Chest: Effort normal and breath sounds normal    Abdomen: Soft, + BS, tender to palpation in epigastric region    Assessment/Plan:  Rajni Naik is a(n) 78y o  year old male with MDD     MEDICAL CLEARANCE  Patient is medically cleared for discharge  All scripts have been written for the patient  1  Asthma  Seems stable at this time  Albuterol inhaler as needed  2  Chronic Pain Syndrome  Tylenol as needed for mild-moderate pains  Will restart Methadone liquid at 84mg once daily  Encouraged patient to continue using PRN Tylenol and resting and elevating his foot for heel pain  3  Opiate Induced Constipation  Senokot-S PRN  4  Gait abnormality  Patient seems stable at this time  5  DJD/osteoarthritis  Patient may get Tylenol on as needed basis  6  Vitamin-D deficiency  I will start the patient on vitamin D2 weekly for 8 weeks and then vitamin D3 daily 1000 units daily  7  Epigastric pain/nausea  CT scan on 08/18/2020 was unable to exclude gastritis/gastric pathology   Continue Protonix and Jyoti Slade PA-C GI has seen the patient, patient is scheduled for EGD on the day of discharge from [facility] (09/02/2020)  8  Allergic dermatitis  Patient is being treated with hydrocortisone 1% cream to be applied 4 times daily as needed and monitor patient's rash

## 2020-09-01 NOTE — PROGRESS NOTES
09/01/20 1030   Activity/Group Checklist   Group Life Skills   Attendance Attended   Attendance Duration (min) Greater than 60   Interactions Interacted appropriately   Affect/Mood Appropriate   Goals Achieved Identified feelings; Discussed coping strategies; Able to listen to others; Able to engage in interactions; Able to reflect/comment on own behavior;Able to self-disclose; Able to recieve feedback

## 2020-09-02 ENCOUNTER — APPOINTMENT (OUTPATIENT)
Dept: GASTROENTEROLOGY | Facility: HOSPITAL | Age: 70
End: 2020-09-02
Attending: INTERNAL MEDICINE
Payer: MEDICARE

## 2020-09-02 ENCOUNTER — ANESTHESIA (OUTPATIENT)
Dept: GASTROENTEROLOGY | Facility: HOSPITAL | Age: 70
End: 2020-09-02

## 2020-09-02 ENCOUNTER — ANESTHESIA EVENT (OUTPATIENT)
Dept: GASTROENTEROLOGY | Facility: HOSPITAL | Age: 70
End: 2020-09-02

## 2020-09-02 VITALS
BODY MASS INDEX: 25.77 KG/M2 | HEART RATE: 80 BPM | HEIGHT: 70 IN | RESPIRATION RATE: 18 BRPM | WEIGHT: 180 LBS | OXYGEN SATURATION: 99 % | TEMPERATURE: 96.1 F | DIASTOLIC BLOOD PRESSURE: 78 MMHG | SYSTOLIC BLOOD PRESSURE: 144 MMHG

## 2020-09-02 PROBLEM — E55.9 VITAMIN D DEFICIENCY: Status: RESOLVED | Noted: 2020-08-21 | Resolved: 2020-09-02

## 2020-09-02 PROBLEM — T40.3X2A: Status: RESOLVED | Noted: 2020-06-05 | Resolved: 2020-09-02

## 2020-09-02 PROBLEM — F11.11 H/O OPIOID ABUSE (HCC): Status: RESOLVED | Noted: 2020-06-08 | Resolved: 2020-09-02

## 2020-09-02 PROBLEM — F33.2 MDD (MAJOR DEPRESSIVE DISORDER), RECURRENT EPISODE, SEVERE (HCC): Status: RESOLVED | Noted: 2020-08-21 | Resolved: 2020-09-02

## 2020-09-02 PROCEDURE — 88305 TISSUE EXAM BY PATHOLOGIST: CPT | Performed by: PATHOLOGY

## 2020-09-02 PROCEDURE — 99238 HOSP IP/OBS DSCHRG MGMT 30/<: CPT | Performed by: PSYCHIATRY & NEUROLOGY

## 2020-09-02 RX ORDER — LIDOCAINE HYDROCHLORIDE 10 MG/ML
INJECTION, SOLUTION EPIDURAL; INFILTRATION; INTRACAUDAL; PERINEURAL AS NEEDED
Status: DISCONTINUED | OUTPATIENT
Start: 2020-09-02 | End: 2020-09-02

## 2020-09-02 RX ORDER — PROPOFOL 10 MG/ML
INJECTION, EMULSION INTRAVENOUS AS NEEDED
Status: DISCONTINUED | OUTPATIENT
Start: 2020-09-02 | End: 2020-09-02

## 2020-09-02 RX ORDER — SODIUM CHLORIDE, SODIUM LACTATE, POTASSIUM CHLORIDE, CALCIUM CHLORIDE 600; 310; 30; 20 MG/100ML; MG/100ML; MG/100ML; MG/100ML
125 INJECTION, SOLUTION INTRAVENOUS CONTINUOUS
Status: DISCONTINUED | OUTPATIENT
Start: 2020-09-02 | End: 2020-09-02 | Stop reason: HOSPADM

## 2020-09-02 RX ORDER — EPHEDRINE SULFATE 50 MG/ML
INJECTION INTRAVENOUS AS NEEDED
Status: DISCONTINUED | OUTPATIENT
Start: 2020-09-02 | End: 2020-09-02

## 2020-09-02 RX ADMIN — EPHEDRINE SULFATE 10 MG: 50 INJECTION, SOLUTION INTRAVENOUS at 14:00

## 2020-09-02 RX ADMIN — SODIUM CHLORIDE, SODIUM LACTATE, POTASSIUM CHLORIDE, AND CALCIUM CHLORIDE 125 ML/HR: .6; .31; .03; .02 INJECTION, SOLUTION INTRAVENOUS at 12:57

## 2020-09-02 RX ADMIN — LIDOCAINE HYDROCHLORIDE 100 MG: 10 INJECTION, SOLUTION EPIDURAL; INFILTRATION; INTRACAUDAL; PERINEURAL at 13:49

## 2020-09-02 RX ADMIN — GABAPENTIN 100 MG: 100 CAPSULE ORAL at 08:31

## 2020-09-02 RX ADMIN — METHADONE HYDROCHLORIDE 85 MG: 10 TABLET ORAL at 08:31

## 2020-09-02 RX ADMIN — SODIUM CHLORIDE, SODIUM LACTATE, POTASSIUM CHLORIDE, AND CALCIUM CHLORIDE: .6; .31; .03; .02 INJECTION, SOLUTION INTRAVENOUS at 13:34

## 2020-09-02 RX ADMIN — PROPOFOL 50 MG: 10 INJECTION, EMULSION INTRAVENOUS at 13:52

## 2020-09-02 RX ADMIN — PANTOPRAZOLE SODIUM 40 MG: 40 TABLET, DELAYED RELEASE ORAL at 08:31

## 2020-09-02 RX ADMIN — BUSPIRONE HYDROCHLORIDE 10 MG: 5 TABLET ORAL at 08:31

## 2020-09-02 RX ADMIN — PROPOFOL 150 MG: 10 INJECTION, EMULSION INTRAVENOUS at 13:48

## 2020-09-02 NOTE — INTERVAL H&P NOTE
H&P reviewed  After examining the patient I find no changes in the patients condition since the H&P had been written      Vitals:    09/02/20 1244   BP: 136/71   Pulse: 94   Resp: 16   Temp: (!) 96 5 °F (35 8 °C)   SpO2:

## 2020-09-02 NOTE — PROGRESS NOTES
The patient noted to be visible resting quietly in room at time of initial assessment  Patient npo except medications with sips of water maintained in preparation for egd  Morning dose of Carafate held as per short procedure RN instructions provided 9/1/2020  The patient noted to be pleasant and bright upon approach  The patient states that he is anxious for upcoming procedure, denies depression, si, hi, and hallucinations  Will continue to monitor

## 2020-09-02 NOTE — PROGRESS NOTES
09/02/20 0943   Team Meeting   Meeting Type Daily Rounds   Team Members Present   Team Members Present Physician;Nurse;; Occupational Therapist   Physician Team Member Dr Tania Gunderson MD   Nursing Team Member Cheyenne Marcos RN   Social Work Team Member Luis Enrique [facility]   OT Team Member Raymundo Hollingsworth [place]   Patient/Family Present   Patient Present No   Patient's Family Present No     Dc today at 1pm; EGD scheduled 1pm; med adjustments yesterday; endorses anxiety re: procedure; friend will provide dc transport after procedure

## 2020-09-02 NOTE — PROGRESS NOTES
09/02/20 1015   Activity/Group Checklist   Group Personal control   Attendance Attended   Attendance Duration (min) Greater than 60   Interactions Interacted appropriately   Affect/Mood Appropriate   Goals Achieved Identified feelings; Identified triggers; Able to listen to others; Able to engage in interactions; Able to reflect/comment on own behavior;Able to manage/cope with feelings; Able to self-disclose; Able to recieve feedback

## 2020-09-02 NOTE — DISCHARGE INSTRUCTIONS
Abdominal Pain   WHAT YOU NEED TO KNOW:   Abdominal pain can be dull, achy, or sharp  You may have pain in one area of your abdomen, or in your entire abdomen  Your pain may be caused by a condition such as constipation, food sensitivity or poisoning, infection, or a blockage  Abdominal pain can also be from a hernia, appendicitis, or an ulcer  Liver, gallbladder, or kidney conditions can also cause abdominal pain  The cause of your abdominal pain may be unknown  DISCHARGE INSTRUCTIONS:   Return to the emergency department if:   · You have new chest pain or shortness of breath  · You have pulsing pain in your upper abdomen or lower back that suddenly becomes constant  · Your pain is in the right lower abdominal area and worsens with movement  · You have a fever over 100 4°F (38°C) or shaking chills  · You are vomiting and cannot keep food or liquids down  · Your pain does not improve or gets worse over the next 8 to 12 hours  · You see blood in your vomit or bowel movements, or they look black and tarry  · Your skin or the whites of your eyes turn yellow  · You are a woman and have a large amount of vaginal bleeding that is not your monthly period  Contact your healthcare provider if:   · You have pain in your lower back  · You are a man and have pain in your testicles  · You have pain when you urinate  · You have questions or concerns about your condition or care  Follow up with your healthcare provider within 24 hours or as directed:  Write down your questions so you remember to ask them during your visits  Medicines:   · Medicines  may be given to calm your stomach and prevent vomiting or to decrease pain  Ask how to take pain medicine safely  · Take your medicine as directed  Contact your healthcare provider if you think your medicine is not helping or if you have side effects  Tell him of her if you are allergic to any medicine   Keep a list of the medicines, vitamins, and herbs you take  Include the amounts, and when and why you take them  Bring the list or the pill bottles to follow-up visits  Carry your medicine list with you in case of an emergency  © 2017 [address] Information is for End User's use only and may not be sold, redistributed or otherwise used for commercial purposes  All illustrations and images included in CareNotes® are the copyrighted property of A D A M [employer] Inc  or Paul Patten  The above information is an  only  It is not intended as medical advice for individual conditions or treatments  Talk to your doctor, nurse or pharmacist before following any medical regimen to see if it is safe and effective for you  Acute Nausea and Vomiting, Ambulatory Care   GENERAL INFORMATION:   Acute nausea and vomiting  starts suddenly, gets worse quickly, and lasts a short time  Nausea and vomiting may be caused by pregnancy, alcohol, infection, or medicines  Common related symptoms include the following:   · Fever    · Abdominal swelling    · Pain, tenderness, or a lump in the abdomen    · Splashing sounds heard in your stomach when you move  Seek immediate care for the following symptoms:   · Blood in your vomit or bowel movements    · Sudden, severe pain in your chest and upper abdomen after hard vomiting    · Dizziness, dry mouth, and thirst    · Urinating very little or not at all    · Muscle weakness, leg cramps, and trouble breathing    · A heart beat that is faster than normal    · Vomiting for more than 48 hours  Treatment for acute nausea and vomiting  may include medicines to calm your stomach and stop the vomiting  You may need IV fluids if you are dehydrated  Manage your nausea and vomiting:   · Drink liquids as directed to prevent dehydration  Ask how much liquid to drink each day and which liquids are best for you  You may need to drink an oral rehydration solution (ORS)   ORS contains water, salts, and sugar that are needed to replace the lost body fluids  Ask what kind of ORS to use, how much to drink, and where to get it  · Eat smaller meals, more often  Eat small amounts of food every 2 to 3 hours, even if you are not hungry  Food in your stomach may help decrease your nausea  · Avoid stress  Find ways to relax and manage your stress  Headaches due to stress may cause nausea and vomiting  Get more rest and sleep  Follow up with your healthcare provider as directed:  Write down your questions so you remember to ask them during your visits  CARE AGREEMENT:   You have the right to help plan your care  Learn about your health condition and how it may be treated  Discuss treatment options with your caregivers to decide what care you want to receive  You always have the right to refuse treatment  The above information is an  only  It is not intended as medical advice for individual conditions or treatments  Talk to your doctor, nurse or pharmacist before following any medical regimen to see if it is safe and effective for you  © 2014 [address] is for End User's use only and may not be sold, redistributed or otherwise used for commercial purposes  All illustrations and images included in CareNotes® are the copyrighted property of [employer]  or [employer]  Buspirone (By mouth)   Buspirone ([id number])  Treats anxiety  Brand Name(s):   There may be other brand names for this medicine  When This Medicine Should Not Be Used: You should not use this medicine if you have had an allergic reaction to buspirone  How to Use This Medicine:   Tablet  · Your doctor will tell you how much of this medicine to take and how often  Do not take more medicine or take it more often than your doctor tells you to  · You may take this medicine with or without food, but take it the same way each time    · You may need to take this medicine for 1 or 2 weeks before you begin to feel better  If a dose is missed:   · If you miss a dose or forget to take your medicine, take it as soon as you can  If it is almost time for your next dose, wait until then to take the medicine and skip the missed dose  · Do not use extra medicine to make up for a missed dose  How to Store and Dispose of This Medicine:   · Store the medicine at room temperature, away from heat, moisture, and direct light  · Keep all medicine out of the reach of children and never share your medicine with anyone  Drugs and Foods to Avoid:   Ask your doctor or pharmacist before using any other medicine, including over-the-counter medicines, vitamins, and herbal products  · You should not use buspirone when you are also using an MAO inhibitor (such as Eldepryl®, Marplan®, Nardil®, Parnate®)  · Do not eat grapefruit, drink grapefruit juice, or drink alcohol while you are using buspirone  · Make sure your doctor knows if you are also using cimetidine (Hilroblesd Horse), dexamethasone (Decadron®), diltiazem (Rosanna Josh), erythromycin (Erythro-Tab®), itraconazole (Sporanox®), nefazodone (Serzone®), rifampin (Rifadin®, Rifamate®, Rifater®), verapamil (Alean Child), or medicine for seizures (such as Dilantin®, Luminal®, Tegretol®)  · Make sure your doctor knows if you are using any medicines that make you sleepy (such as sleeping pills, cold and allergy medicine, narcotic pain relievers, or sedatives)  Warnings While Using This Medicine:   · Make sure your doctor knows if you are pregnant or breastfeeding, or if you have kidney or liver disease  · Do not stop using this medicine suddenly without asking your doctor  You may need to slowly decrease your dose before stopping it completely  · This medicine may make you dizzy or drowsy  Avoid driving, using machines, or doing anything else that could be dangerous if you are not alert    Possible Side Effects While Using This Medicine: Call your doctor right away if you notice any of these side effects:  · Fast or pounding heartbeat  · Numbness or tingling feeling  · Tremors or shaking  If you notice these less serious side effects, talk with your doctor:   · Drowsiness or weakness  · Dry mouth  · Feeling restless or nervous, trouble sleeping  · Headache  · Nausea, constipation, upset stomach  If you notice other side effects that you think are caused by this medicine, tell your doctor  Call your doctor for medical advice about side effects  You may report side effects to FDA at 6-791-FDA-2673  © 2017 [address] Information is for End User's use only and may not be sold, redistributed or otherwise used for commercial purposes  The above information is an  only  It is not intended as medical advice for individual conditions or treatments  Talk to your doctor, nurse or pharmacist before following any medical regimen to see if it is safe and effective for you  Trazodone (By mouth)   Trazodone (TRAZ-oh-done)  Treats depression  Brand Name(s): Oleptro   There may be other brand names for this medicine  When This Medicine Should Not Be Used: This medicine is not right for everyone  Do not use it if you had an allergic reaction to trazodone  How to Use This Medicine:   Tablet, Long Acting Tablet  · Take your medicine as directed  Your dose may need to be changed several times to find what works best for you  · Regular tablet: Take it with or shortly after a meal or light snack  · Extended-release tablet: Take it at the same time each day, preferably at bedtime, without food  · The tablet can be swallowed whole, or you may break the tablet in half along the score line  Do not break the tablet unless your doctor tells you to  Do not crush or chew the tablet  · This medicine should come with a Medication Guide  Ask your pharmacist for a copy if you do not have one  · Missed dose:  Take a dose as soon as you remember  If it is almost time for your next dose, wait until then and take a regular dose  Do not take extra medicine to make up for a missed dose  · Store the medicine in a closed container at room temperature, away from heat, moisture, and direct light  Drugs and Foods to Avoid:   Ask your doctor or pharmacist before using any other medicine, including over-the-counter medicines, vitamins, and herbal products  · Do not use trazodone if you currently take an MAO inhibitor (MAOI) or have used an MAOI in the past 14 days  · Tell your doctor if you also use any of the following:  ¨ Carbamazepine, digoxin, phenytoin, indinavir, ritonavir, buspirone, fentanyl, lithium, tryptophan, Edvin's wort, tramadol  ¨ Medicine to treat a fungal infection (such as itraconazole, ketoconazole), a diuretic (water pill), blood pressure medicine, an NSAID pain or arthritis medicine (such as aspirin, celecoxib, diclofenac, ibuprofen, naproxen), a blood thinner (such as warfarin), other medicine for depression, or triptan medicine to treat migraine headaches  · Do not drink alcohol while you are using this medicine  · Tell your doctor if you use anything else that makes you sleepy  Some examples are allergy medicine, narcotic pain medicine, and alcohol  Warnings While Using This Medicine:   · Tell your doctor if you are pregnant or breastfeeding, or if you have kidney disease, liver disease, bleeding problems, glaucoma, heart disease, heart rhythm problems, or low blood pressure  Tell your doctor if you recently had a heart attack  · For some children, teenagers, and young adults, this medicine may increase mental or emotional problems  This may lead to thoughts of suicide and violence  Talk with your doctor right away if you have any thoughts or behavior changes that concern you  Tell your doctor if you or anyone in your family has a history of bipolar disorder or suicide attempts    · This medicine may cause the following problems:  ¨ Serotonin syndrome (more likely when used with certain other medicines)  ¨ Heart rhythm problems (QT prolongation)  ¨ Low sodium levels  ¨ Higher risk of bleeding  · Do not stop using this medicine suddenly  Your doctor will need to slowly decrease your dose before you stop it completely  · This medicine may make you dizzy or drowsy  Do not drive or do anything that could be dangerous until you know how this medicine affects you  Stand or sit up slowly if you are dizzy  · Tell any doctor or dentist who treats you that you are using this medicine  You may need to stop using this medicine several days before you have surgery or medical tests  · Your doctor will check your progress and the effects of this medicine at regular visits  Keep all appointments  · Keep all medicine out of the reach of children  Never share your medicine with anyone  Possible Side Effects While Using This Medicine:   Call your doctor right away if you notice any of these side effects:  · Allergic reaction: Itching or hives, swelling in your face or hands, swelling or tingling in your mouth or throat, chest tightness, trouble breathing  · Anxiety, restlessness, fever, sweating, muscle spasms, nausea, vomiting, diarrhea, seeing or hearing things that are not there  · Confusion, weakness, muscle twitching  · Fast, pounding, or uneven heartbeat  · Lightheadedness, dizziness, fainting  · Painful, prolonged erection of your penis  · Sudden increase in energy, feeling irritable, trouble sleeping  · Thoughts of hurting yourself or others, unusual behavior  · Unusual bleeding or bruising  If you notice these less serious side effects, talk with your doctor:   · Constipation, mild nausea  · Dry mouth  · Eye pain, vision changes, seeing halos around lights  · Headache  · Sleepiness or unusual drowsiness  If you notice other side effects that you think are caused by this medicine, tell your doctor     Call your doctor for medical advice about side effects  You may report side effects to FDA at 3-099-FDA-6317  © 2017 [address] Information is for End User's use only and may not be sold, redistributed or otherwise used for commercial purposes  The above information is an  only  It is not intended as medical advice for individual conditions or treatments  Talk to your doctor, nurse or pharmacist before following any medical regimen to see if it is safe and effective for you  Upper Endoscopy   WHAT YOU NEED TO KNOW:   An upper endoscopy is also called an upper gastrointestinal (GI) endoscopy, or an esophagogastroduodenoscopy (EGD)  You may feel bloated, gassy, or have some abdominal discomfort after your procedure  Your throat may be sore for 24 to 36 hours  You may burp or pass gas from air that is still inside your body  DISCHARGE INSTRUCTIONS:   Call 911 for any of the following:   · You have sudden chest pain or trouble breathing  Seek care immediately if:   · You feel dizzy or faint  · You have trouble swallowing  · Your bowel movements are very dark or black  · Your abdomen is hard and firm and you have severe pain  · You vomit blood  Contact your healthcare provider if:   · You feel full or bloated and cannot burp or pass gas  · You have not had a bowel movement for 3 days after your procedure  · You have neck pain  · You have a fever or chills  · You have nausea or are vomiting  · You have a rash or hives  · You have questions or concerns about your endoscopy  Relieve a sore throat:  Suck on throat lozenges or crushed ice  Gargle with a small amount of warm salt water  Mix 1 teaspoon of salt and 1 cup of warm water to make salt water  Relieve gas and discomfort from bloating:  Lie on your right side with a heating pad on your abdomen  Take short walks to help pass gas  Eat small meals until bloating is relieved  Rest after your procedure:   You have been given medicine to relax you  Do not  drive or make important decisions until the day after your procedure  Return to your normal activity as directed  You can usually return to work the day after your procedure  Follow up with your healthcare provider as directed:  Write down your questions so you remember to ask them during your visits  © 2017 [address] is for End User's use only and may not be sold, redistributed or otherwise used for commercial purposes  All illustrations and images included in CareNotes® are the copyrighted property of [employer]  or Paul Patten  The above information is an  only  It is not intended as medical advice for individual conditions or treatments  Talk to your doctor, nurse or pharmacist before following any medical regimen to see if it is safe and effective for you

## 2020-09-02 NOTE — PLAN OF CARE
Problem: Ineffective Coping  Goal: Cooperates with admission process  Description: Interventions:   - Complete admission process  Outcome: Completed  Goal: Identifies ineffective coping skills  9/2/2020 0904 by Matthieu Thacker RN  Outcome: Completed  9/2/2020 0843 by Matthieu Thacker RN  Outcome: Adequate for Discharge  Goal: Identifies healthy coping skills  9/2/2020 0904 by Matthieu Thacker RN  Outcome: Completed  9/2/2020 0843 by Matthieu Thacker RN  Outcome: Adequate for Discharge  Goal: Demonstrates healthy coping skills  9/2/2020 0904 by Matthieu Thacker RN  Outcome: Completed  9/2/2020 0843 by Matthieu Thacker RN  Outcome: Adequate for Discharge  Goal: Patient/Family participate in treatment and DC plans  Description: Interventions:  - Provide therapeutic environment  9/2/2020 0904 by Matthieu Thacker RN  Outcome: Completed  9/2/2020 0843 by Matthieu Thacker RN  Outcome: Adequate for Discharge  Goal: Patient/Family verbalizes awareness of resources  9/2/2020 0904 by Matthieu Thacker RN  Outcome: Completed  9/2/2020 0843 by Matthieu Thacker RN  Outcome: Adequate for Discharge  Goal: Understands least restrictive measures  Description: Interventions:  - Utilize least restrictive behavior  9/2/2020 0904 by Matthieu Thacker RN  Outcome: Completed  9/2/2020 0843 by Matthieu Thacker RN  Outcome: Adequate for Discharge  Goal: Free from restraint events  Description: - Utilize least restrictive measures   - Provide behavioral interventions   - Redirect inappropriate behaviors   9/2/2020 0904 by Matthieu Thacker RN  Outcome: Completed  9/2/2020 0843 by Matthieu Thacker RN  Outcome: Adequate for Discharge     Problem: Risk for Self Injury/Neglect  Goal: Treatment Goal: Remain safe during length of stay, learn and adopt new coping skills, and be free of self-injurious ideation, impulses and acts at the time of discharge  9/2/2020 0904 by Matthieu Thacker RN  Outcome: Completed  9/2/2020 0843 by Lynnette Garvey RN  Outcome: Adequate for Discharge  Goal: Verbalize thoughts and feelings  Description: Interventions:  - Assess and re-assess patient's lethality and potential for self-injury  - Engage patient in 1:1 interactions, daily, for a minimum of 15 minutes  - Encourage patient to express feelings, fears, frustrations, hopes  - Establish rapport/trust with patient   9/2/2020 0904 by Lynnette Garvey RN  Outcome: Completed  9/2/2020 0843 by Lynnette Garvey RN  Outcome: Adequate for Discharge  Goal: Refrain from harming self  Description: Interventions:  - Monitor patient closely, per order  - Develop a trusting relationship  - Supervise medication ingestion, monitor effects and side effects   9/2/2020 0904 by Lynnette Garvey RN  Outcome: Completed  9/2/2020 0843 by Lynnette Garvey RN  Outcome: Adequate for Discharge  Goal: Attend and participate in unit activities, including therapeutic, recreational, and educational groups  Description: Interventions:  - Provide therapeutic and educational activities daily, encourage attendance and participation, and document same in the medical record  - Obtain collateral information, encourage visitation and family involvement in care   9/2/2020 0904 by Lynnette Garvey RN  Outcome: Completed  9/2/2020 0843 by Lynnette Garvey RN  Outcome: Adequate for Discharge  Goal: Recognize maladaptive responses and adopt new coping mechanisms  9/2/2020 0904 by Lynnette Garvey RN  Outcome: Completed  9/2/2020 0843 by Lynnette Garvey RN  Outcome: Adequate for Discharge  Goal: Complete daily ADLs, including personal hygiene independently, as able  Description: Interventions:  - Observe, teach, and assist patient with ADLS  - Monitor and promote a balance of rest/activity, with adequate nutrition and elimination  9/2/2020 0904 by Lynnette Garvey RN  Outcome: Completed  9/2/2020 0843 by Lynnette Garvey RN  Outcome: Adequate for Discharge     Problem: Depression  Goal: Treatment Goal: Demonstrate behavioral control of depressive symptoms, verbalize feelings of improved mood/affect, and adopt new coping skills prior to discharge  9/2/2020 0904 by Khushi Mccarthy RN  Outcome: Completed  9/2/2020 0843 by Khushi Mccarthy RN  Outcome: Adequate for Discharge  Goal: Verbalize thoughts and feelings  Description: Interventions:  - Assess and re-assess patient's level of risk   - Engage patient in 1:1 interactions, daily, for a minimum of 15 minutes   - Encourage patient to express feelings, fears, frustrations, hopes   9/2/2020 0904 by Khushi Mccarthy RN  Outcome: Completed  9/2/2020 0843 by Khushi Mccarthy RN  Outcome: Adequate for Discharge  Goal: Refrain from harming self  Description: Interventions:  - Monitor patient closely, per order   - Supervise medication ingestion, monitor effects and side effects   9/2/2020 0904 by Khushi Mccarthy RN  Outcome: Completed  9/2/2020 0843 by Khushi Mccarthy RN  Outcome: Adequate for Discharge  Goal: Refrain from isolation  Description: Interventions:  - Develop a trusting relationship   - Encourage socialization   9/2/2020 0904 by Khushi Mccarthy RN  Outcome: Completed  9/2/2020 0843 by Khushi Mccarthy RN  Outcome: Adequate for Discharge  Goal: Refrain from self-neglect  9/2/2020 0904 by Khushi Mccarthy RN  Outcome: Completed  9/2/2020 0843 by Khushi Mccarthy RN  Outcome: Adequate for Discharge  Goal: Attend and participate in unit activities, including therapeutic, recreational, and educational groups  Description: Interventions:  - Provide therapeutic and educational activities daily, encourage attendance and participation, and document same in the medical record   9/2/2020 0904 by Khushi Mccarthy RN  Outcome: Completed  9/2/2020 0843 by Khushi Mccarthy RN  Outcome: Adequate for Discharge  Goal: Complete daily ADLs, including personal hygiene independently, as able  Description: Interventions:  - Observe, teach, and assist patient with ADLS  -  Monitor and promote a balance of rest/activity, with adequate nutrition and elimination   9/2/2020 0904 by Tuan Potts RN  Outcome: Completed  9/2/2020 0843 by Tuan Potts RN  Outcome: Adequate for Discharge     Problem: Anxiety  Goal: Anxiety is at manageable level  Description: Interventions:  - Assess and monitor patient's anxiety level  - Monitor for signs and symptoms (heart palpitations, chest pain, shortness of breath, headaches, nausea, feeling jumpy, restlessness, irritable, apprehensive)  - Collaborate with interdisciplinary team and initiate plan and interventions as ordered    - [place] patient to unit/surroundings  - Explain treatment plan  - Encourage participation in care  - Encourage verbalization of concerns/fears  - Identify coping mechanisms  - Assist in developing anxiety-reducing skills  - Administer/offer alternative therapies  - Limit or eliminate stimulants  9/2/2020 0904 by Tuan Potts RN  Outcome: Completed  9/2/2020 0843 by Tuan Potts RN  Outcome: Adequate for Discharge

## 2020-09-02 NOTE — PROGRESS NOTES
09/02/20 3310   Activity/Group Checklist   Group Community meeting   Attendance Did not attend  (Pt offered and encouraged to join grp; pt remained in bed)

## 2020-09-02 NOTE — BH TRANSITION RECORD
Contact Information: If you have any questions, concerns, pended studies, tests and/or procedures, or emergencies regarding your inpatient behavioral health visit  Please contact [facility] older adult behavioral health unit [phone] and ask to speak to a , nurse or physician  A contact is available 24 hours/ 7 days a week at this number  Summary of Procedures Performed During your Stay:  Below is a list of major procedures performed during your hospital stay and a summary of results:  - No major procedures performed  Pending Studies (From admission, onward)    None        If studies are pending at discharge, follow up with your PCP and/or referring provider

## 2020-09-02 NOTE — PROGRESS NOTES
Pt discharged with list of belongings:    Pair of jeans  Black t-shirt  1 button-up jalen t-shirt  Pair of white socks  Pair of black underwear  Belt  Black leather jacket  cellphone    Pair of brown boots  camo baseball cap  Long metal necklace with 2 rings and pendant on it  3 pocket knives  keychain with watch  Medicare card  $6 00 cash  $3 07 in change  2 [place]- [place], [place]  1 V[place] card  1 black wallet

## 2020-09-02 NOTE — ANESTHESIA PREPROCEDURE EVALUATION
Procedure:  EGD    Relevant Problems   GI/HEPATIC   (+) GERD (gastroesophageal reflux disease)      MUSCULOSKELETAL   (+) DJD (degenerative joint disease)      NEURO/PSYCH   (+) Chronic pain      PULMONARY   (+) Asthma        Physical Exam    Airway    Mallampati score: II  TM Distance: >3 FB  Neck ROM: full     Dental       Cardiovascular  Rhythm: regular,     Pulmonary  Breath sounds clear to auscultation,     Other Findings        Anesthesia Plan  ASA Score- 2     Anesthesia Type- IV sedation with anesthesia with ASA Monitors  Additional Monitors:   Airway Plan:           Plan Factors-Exercise tolerance (METS): >4 METS  Induction- intravenous  Postoperative Plan-     Informed Consent- Anesthetic plan and risks discussed with patient  I personally reviewed this patient with the CRNA  Discussed and agreed on the Anesthesia Plan with the CRNA  Duy Alfaro

## 2020-09-02 NOTE — DISCHARGE SUMMARY
Discharge Summary - [address] [facility] 78 y o  male MRN: [id number]  Unit/Bed#: [facility] 255-40 Encounter: [id number]     Admission Date: 8/20/2020         Discharge Date: 9/2/2020 12:58 PM    Attending Psychiatrist: Rito Hilario MD    Reason for Admission/HPI: Major depressive disorder, single episode, severe without psychotic features [F32 2]    History of Present Illness     Fernando Burgos is a 78 y o  male with a history of depression, anxiety and substance use who was admitted to the inpatient older adult psychiatric unit on a voluntary 12 commitment basis due to significant worsening of depression, anxiety, unstable mood and suicidal ideation  Patient initially presented to ED because of abdominal pain, nausea, sweats and occasions chills  Abdominal CT scan did not show any acute change  UDS positive for Methadone and THC  EKG with no acute changes  On evaluation in the inpatient psychiatric unit Joe Kathleen reports that he has been feeling increasingly depressed, anxious and suicidal for the past several weeks  He also  endorses feeling hopeless, helpless, poor sleep and appetite with some weight loss  Patient admits intermittent passive wish to die but denies any active suicide plan or intent to hurt himself presently  He denies any homicidal ideation, delusion, hallucination or acute withdrawal symptoms  He has been taking his Methadone which he gets from the Methadone clinic  Patient agrees to be compliant with medication and treatment plan in the unit  Hospital Course: The patient was admitted to the inpatient psychiatric unit and started on every 7 minutes precautions  During the hospitalization the patient was attending individual therapy, group therapy, milieu therapy and occupational therapy  Psychiatric medications were titrated over the hospital stay   To address mood instability, anxiety symptoms and insomnia the patient was started on mood stabilizer Neurontin, anxiolytic medication Buspar and hypnotic medication Trazodone  Medication doses were titrated during the hospital course  Prior to beginning of treatment medications risks and benefits and possible side effects including risk of cardiovascular events in elderly related to treatment with antipsychotic medications and risk of suicidality and serotonin syndrome related to treatment with antidepressants were reviewed with the patient  The patient verbalized understanding and agreement for treatment  Patient's symptoms improved gradually over the hospital course  At the end of treatment the patient was doing well  Mood was stable at the time of discharge  The patient denied suicidal ideation, intent or plan at the time of discharge and denied homicidal ideation, intent or plan at the time of discharge  There was no overt psychosis at the time of discharge  Sleep and appetite were improved  The patient was tolerating medications and was not reporting any significant side effects at the time of discharge  Since the patient was doing well at the end of the hospitalization, treatment team felt that the patient could be safely discharged to get EGD and follow up outpatient care  The outpatient follow up was arranged by the unit  upon discharge      Mental Status at time of Discharge:     Appearance:  age appropriate   Behavior:  normal   Speech:  normal volume   Mood:  euthymic   Affect:  mood-congruent   Thought Process:  goal directed   Thought Content:  normal   Perceptual Disturbances: None   Risk Potential: Suicidal Ideations none   Sensorium:  person, place and time/date   Cognition:  recent and remote memory grossly intact   Consciousness:  alert and awake    Attention: attention span and concentration were age appropriate   Insight:  fair   Judgment: good   Gait/Station: normal gait/station   Motor Activity: no abnormal movements     Admission Diagnosis:Major depressive disorder, single episode, severe without psychotic features [F32 2]    Discharge Diagnosis:   Principal Problem (Resolved):    MDD (major depressive disorder), recurrent episode, severe ([place] [address] )  Active Problems:    Asthma    Chronic pain    DJD (degenerative joint disease)    GERD (gastroesophageal reflux disease)  Resolved Problems:    H/O opioid abuse ([place] [address] )    Vitamin D deficiency    Lab results:  Admission on 08/20/2020, Discharged on 09/02/2020   Component Date Value    Vitamin B-12 08/20/2020 685     Vit D, 25-Hydroxy 08/20/2020 22 1*    Folate 08/20/2020 12 5        Discharge Medications:  Discharge Medication List as of 9/2/2020 10:55 AM      START taking these medications    Details   busPIRone (BUSPAR) 10 mg tablet Take 1 tablet (10 mg total) by mouth 2 (two) times a day, Starting Tue 9/1/2020, Normal      cholecalciferol (VITAMIN D3) 1,000 units tablet Take 1 tablet (1,000 Units total) by mouth daily, Starting Fri 9/11/2020, Normal      ergocalciferol (VITAMIN D2) 50,000 units Take 1 capsule (50,000 Units total) by mouth once a week for 6 doses, Starting Fri 9/4/2020, Until Sat 10/10/2020, Normal      hydrocortisone 1 % cream Apply topically 4 (four) times a day as needed for irritation or rash, Starting Mon 8/31/2020, Normal      pantoprazole (PROTONIX) 40 mg tablet Take 1 tablet (40 mg total) by mouth 2 (two) times a day before meals, Starting Mon 8/31/2020, Normal      polyethylene glycol (MIRALAX) 17 g Take 1 packet (17 g total) by mouth daily as needed (constipation refractory to Senokot-S), Starting Mon 8/31/2020, Normal      senna-docusate sodium (SENOKOT S) 8 6-50 mg per tablet Take 1 tablet by mouth daily as needed for constipation, Starting Mon 8/31/2020, Normal      traZODone (DESYREL) 50 mg tablet Take 1 tablet (50 mg total) by mouth daily at bedtime, Starting Tue 9/1/2020, Normal            Discharge Medication List as of 9/2/2020 10:55 AM      STOP taking these medications       famotidine (PEPCID) 20 mg tablet Comments:   Reason for Stopping:         mirtazapine (REMERON) 30 mg tablet Comments:   Reason for Stopping:              Discharge Medication List as of 9/2/2020 10:55 AM      CONTINUE these medications which have CHANGED    Details   albuterol (PROVENTIL HFA,VENTOLIN HFA) 90 mcg/act inhaler Inhale 2 puffs every 4 (four) hours as needed for wheezing, Starting Mon 8/31/2020, Normal      gabapentin (NEURONTIN) 100 mg capsule Take 1 capsule (100 mg total) by mouth 3 (three) times a day, Starting Mon 8/31/2020, Normal      methadone (DOLOPHINE) 10 mg/5mL solution Take 42 mL (84 mg total) by mouth daily for 10 daysMax Daily Amount: 84 mg, Starting Mon 8/31/2020, Until Thu 9/10/2020, Normal      ondansetron (ZOFRAN-ODT) 4 mg disintegrating tablet Take 1 tablet (4 mg total) by mouth every 6 (six) hours as needed for nausea or vomiting, Starting Mon 8/31/2020, Print      sucralfate (CARAFATE) 1 g tablet Take 1 tablet (1 g total) by mouth 4 (four) times a day, Starting Mon 8/31/2020, Normal            Discharge Medication List as of 9/2/2020 10:55 AM           Discharge instructions/Information to patient and family:   See after visit summary for information provided to patient and family  Provisions for Follow-Up Care:  See after visit summary for information related to follow-up care and any pertinent home health orders  Discharge Statement   I spent 30 minutes discharging the patient  This time was spent on the day of discharge  I had direct contact with the patient on the day of discharge  Additional documentation is required if more than 30 minutes were spent on discharge

## 2020-09-02 NOTE — PROGRESS NOTES
Progress Note - Sary Atwood 78 y o  male MRN: [id number]    Unit/Bed#: [facility] 201-01 Encounter: [id number]        Subjective:   Patient seen and examined at bedside after reviewing the chart and discussing the case with the caring staff  Patient examined at bedside  Patient has no acute issues  Patient is scheduled for discharge today  Patient request all his prescriptions  I reviewed and reconciled patient's problem list and medications  Patient is scheduled to receive EGD on 09/02/2020 as an outpatient on the day of his discharge  Physical Exam   Vitals: Blood pressure 114/60, pulse 59, temperature 97 6 °F (36 4 °C), temperature source Temporal, resp  rate 16, height 5' 10" (1 778 m), weight 81 7 kg (180 lb 3 2 oz), SpO2 98 %  ,Body mass index is 25 86 kg/m²  Constitutional: He appears well-developed  HEENT: PERR, EOMI, MMM  Cardiovascular: Normal rate and regular rhythm  Pulmonary/Chest: Effort normal and breath sounds normal    Abdomen: Soft, + BS, tender to palpation in epigastric region    Assessment/Plan:  Sary Atwood is a(n) 78y o  year old male with MDD     MEDICAL CLEARANCE  Patient is medically cleared for discharge  All scripts have been written for the patient  1  Asthma  Seems stable at this time  Albuterol inhaler as needed  2  Chronic Pain Syndrome  Tylenol as needed for mild-moderate pains  Will restart Methadone liquid at 84mg once daily  Encouraged patient to continue using PRN Tylenol and resting and elevating his foot for heel pain  3  Opiate Induced Constipation  Senokot-S PRN  4  Gait abnormality  Patient seems stable at this time  5  DJD/osteoarthritis  Patient may get Tylenol on as needed basis  6  Vitamin-D deficiency  I will start the patient on vitamin D2 weekly for 8 weeks and then vitamin D3 daily 1000 units daily  7  Epigastric pain/nausea  CT scan on 08/18/2020 was unable to exclude gastritis/gastric pathology  Continue Protonix and Carafate  OTONIEL Jaimes has seen the patient, patient is scheduled for EGD on the day of discharge from [facility] (09/02/2020)  8  Allergic dermatitis  Patient is being treated with hydrocortisone 1% cream to be applied 4 times daily as needed and monitor patient's rash

## 2020-09-02 NOTE — ANESTHESIA POSTPROCEDURE EVALUATION
Post-Op Assessment Note    CV Status:  Stable  Pain Score: 0    Pain management: adequate     Mental Status:  Alert and awake   Hydration Status:  Euvolemic   PONV Controlled:  Controlled   Airway Patency:  Patent      Post Op Vitals Reviewed: Yes      Staff: CRNA         No complications documented      BP      Temp     Pulse     Resp      SpO2

## 2020-09-02 NOTE — NURSING NOTE
Reviewed AVS/discharge packet with patient  No further questions  Belongings, scripts, and AVS/discharge packet given to patient upon discharge  PCA escorted pt to [facility]

## 2020-09-02 NOTE — PROGRESS NOTES
09/02/20 1154   Activity/Group Checklist   Group Admission/Discharge  (Discharge Plan)   Attendance Attended   Attendance Duration (min) 0-15   Interactions Interacted appropriately   Affect/Mood Appropriate   Goals Achieved Identified feelings; Identified triggers; Identified relapse prevention strategies; Discussed coping strategies; Discussed discharge plans; Able to listen to others; Able to engage in interactions; Able to reflect/comment on own behavior;Able to manage/cope with feelings;Verbalized increased hopefulness; Able to self-disclose; Able to recieve feedback; Able to experience relief/decrease in symptoms   Patient shared that he is anxious over procedure upon discharge; but otherwise feels safe and is ready for discharge  He has been attending groups and open with his participation  He has been more bright with interactions and is willing to help peers

## 2020-09-02 NOTE — PROGRESS NOTES
Patient out in the milieu social with peers and staff  Ate HS snack and attended group  Upon approach patient's mood and affect is constricted and anxious  Patient endorses depression 3/10 and anxiety 6-7/10  Patient stated he is "a little nervous about the EGD but can not wait to have some answers " Denies pain/hallucinations/SI/HI  Took HS medications without difficulty  Will continue to monitor safety and behaviors every 7 minutes

## 2020-09-02 NOTE — PLAN OF CARE
Problem: Ineffective Coping  Goal: Cooperates with admission process  Description: Interventions:   - Complete admission process  Outcome: Completed  Goal: Identifies ineffective coping skills  Outcome: Adequate for Discharge  Goal: Identifies healthy coping skills  Outcome: Adequate for Discharge  Goal: Demonstrates healthy coping skills  Outcome: Adequate for Discharge  Goal: Patient/Family participate in treatment and DC plans  Description: Interventions:  - Provide therapeutic environment  Outcome: Adequate for Discharge  Goal: Patient/Family verbalizes awareness of resources  Outcome: Adequate for Discharge  Goal: Understands least restrictive measures  Description: Interventions:  - Utilize least restrictive behavior  Outcome: Adequate for Discharge  Goal: Free from restraint events  Description: - Utilize least restrictive measures   - Provide behavioral interventions   - Redirect inappropriate behaviors   Outcome: Adequate for Discharge     Problem: Risk for Self Injury/Neglect  Goal: Treatment Goal: Remain safe during length of stay, learn and adopt new coping skills, and be free of self-injurious ideation, impulses and acts at the time of discharge  Outcome: Adequate for Discharge  Goal: Verbalize thoughts and feelings  Description: Interventions:  - Assess and re-assess patient's lethality and potential for self-injury  - Engage patient in 1:1 interactions, daily, for a minimum of 15 minutes  - Encourage patient to express feelings, fears, frustrations, hopes  - Establish rapport/trust with patient   Outcome: Adequate for Discharge  Goal: Refrain from harming self  Description: Interventions:  - Monitor patient closely, per order  - Develop a trusting relationship  - Supervise medication ingestion, monitor effects and side effects   Outcome: Adequate for Discharge  Goal: Attend and participate in unit activities, including therapeutic, recreational, and educational groups  Description: Interventions:  - Provide therapeutic and educational activities daily, encourage attendance and participation, and document same in the medical record  - Obtain collateral information, encourage visitation and family involvement in care   Outcome: Adequate for Discharge  Goal: Recognize maladaptive responses and adopt new coping mechanisms  Outcome: Adequate for Discharge  Goal: Complete daily ADLs, including personal hygiene independently, as able  Description: Interventions:  - Observe, teach, and assist patient with ADLS  - Monitor and promote a balance of rest/activity, with adequate nutrition and elimination  Outcome: Adequate for Discharge     Problem: Depression  Goal: Treatment Goal: Demonstrate behavioral control of depressive symptoms, verbalize feelings of improved mood/affect, and adopt new coping skills prior to discharge  Outcome: Adequate for Discharge  Goal: Verbalize thoughts and feelings  Description: Interventions:  - Assess and re-assess patient's level of risk   - Engage patient in 1:1 interactions, daily, for a minimum of 15 minutes   - Encourage patient to express feelings, fears, frustrations, hopes   Outcome: Adequate for Discharge  Goal: Refrain from harming self  Description: Interventions:  - Monitor patient closely, per order   - Supervise medication ingestion, monitor effects and side effects   Outcome: Adequate for Discharge  Goal: Refrain from isolation  Description: Interventions:  - Develop a trusting relationship   - Encourage socialization   Outcome: Adequate for Discharge  Goal: Refrain from self-neglect  Outcome: Adequate for Discharge  Goal: Attend and participate in unit activities, including therapeutic, recreational, and educational groups  Description: Interventions:  - Provide therapeutic and educational activities daily, encourage attendance and participation, and document same in the medical record   Outcome: Adequate for Discharge  Goal: Complete daily ADLs, including personal hygiene independently, as able  Description: Interventions:  - Observe, teach, and assist patient with ADLS  -  Monitor and promote a balance of rest/activity, with adequate nutrition and elimination   Outcome: Adequate for Discharge     Problem: Anxiety  Goal: Anxiety is at manageable level  Description: Interventions:  - Assess and monitor patient's anxiety level  - Monitor for signs and symptoms (heart palpitations, chest pain, shortness of breath, headaches, nausea, feeling jumpy, restlessness, irritable, apprehensive)  - Collaborate with interdisciplinary team and initiate plan and interventions as ordered    - Dana patient to unit/surroundings  - Explain treatment plan  - Encourage participation in care  - Encourage verbalization of concerns/fears  - Identify coping mechanisms  - Assist in developing anxiety-reducing skills  - Administer/offer alternative therapies  - Limit or eliminate stimulants  Outcome: Adequate for Discharge

## 2020-09-24 DIAGNOSIS — F33.2 SEVERE EPISODE OF RECURRENT MAJOR DEPRESSIVE DISORDER, WITHOUT PSYCHOTIC FEATURES (HCC): ICD-10-CM

## 2020-09-24 RX ORDER — TRAZODONE HYDROCHLORIDE 50 MG/1
TABLET ORAL
Qty: 30 TABLET | Refills: 1 | Status: SHIPPED | OUTPATIENT
Start: 2020-09-24

## 2020-10-13 PROBLEM — F32.2 SEVERE MAJOR DEPRESSION, SINGLE EPISODE (HCC): Status: ACTIVE | Noted: 2020-10-13

## 2020-10-26 ENCOUNTER — TELEPHONE (OUTPATIENT)
Dept: PSYCHIATRY | Facility: CLINIC | Age: 70
End: 2020-10-26

## 2020-10-31 ENCOUNTER — HOSPITAL ENCOUNTER (EMERGENCY)
Facility: HOSPITAL | Age: 70
Discharge: HOME | End: 2020-10-31
Attending: EMERGENCY MEDICINE | Admitting: EMERGENCY MEDICINE
Payer: MEDICARE

## 2020-10-31 VITALS
WEIGHT: 189.38 LBS | HEIGHT: 70 IN | DIASTOLIC BLOOD PRESSURE: 88 MMHG | HEART RATE: 85 BPM | TEMPERATURE: 99.2 F | BODY MASS INDEX: 27.11 KG/M2 | SYSTOLIC BLOOD PRESSURE: 153 MMHG | RESPIRATION RATE: 18 BRPM | OXYGEN SATURATION: 96 %

## 2020-10-31 DIAGNOSIS — K21.9 GERD (GASTROESOPHAGEAL REFLUX DISEASE): ICD-10-CM

## 2020-10-31 DIAGNOSIS — F41.9 ANXIETY: Primary | ICD-10-CM

## 2020-10-31 LAB
ALBUMIN SERPL BCP-MCNC: 4.2 G/DL (ref 3.5–5)
ALP SERPL-CCNC: 78 U/L (ref 46–116)
ALT SERPL W P-5'-P-CCNC: 37 U/L (ref 12–78)
ANION GAP SERPL CALCULATED.3IONS-SCNC: 11 MMOL/L (ref 4–13)
AST SERPL W P-5'-P-CCNC: 29 U/L (ref 5–45)
ATRIAL RATE: 79 BPM
BASOPHILS # BLD AUTO: 0.07 THOUSANDS/ΜL (ref 0–0.1)
BASOPHILS NFR BLD AUTO: 1 % (ref 0–1)
BILIRUB SERPL-MCNC: 0.6 MG/DL (ref 0.2–1)
BUN SERPL-MCNC: 14 MG/DL (ref 5–25)
CALCIUM SERPL-MCNC: 10.3 MG/DL (ref 8.3–10.1)
CHLORIDE SERPL-SCNC: 99 MMOL/L (ref 100–108)
CHOLEST SERPL-MCNC: 224 MG/DL (ref 50–200)
CO2 SERPL-SCNC: 28 MMOL/L (ref 21–32)
CREAT SERPL-MCNC: 1.26 MG/DL (ref 0.6–1.3)
EOSINOPHIL # BLD AUTO: 0.09 THOUSAND/ΜL (ref 0–0.61)
EOSINOPHIL NFR BLD AUTO: 1 % (ref 0–6)
ERYTHROCYTE [DISTWIDTH] IN BLOOD BY AUTOMATED COUNT: 12.9 % (ref 11.6–15.1)
ETHANOL SERPL-MCNC: <3 MG/DL (ref 0–3)
GFR SERPL CREATININE-BSD FRML MDRD: 58 ML/MIN/1.73SQ M
GLUCOSE SERPL-MCNC: 123 MG/DL (ref 65–140)
HCT VFR BLD AUTO: 48.6 % (ref 36.5–49.3)
HDLC SERPL-MCNC: 73 MG/DL
HGB BLD-MCNC: 16 G/DL (ref 12–17)
HOLD SPECIMEN: NORMAL
IMM GRANULOCYTES # BLD AUTO: 0.03 THOUSAND/UL (ref 0–0.2)
IMM GRANULOCYTES NFR BLD AUTO: 0 % (ref 0–2)
LDLC SERPL CALC-MCNC: 136 MG/DL (ref 0–100)
LIPASE SERPL-CCNC: 198 U/L (ref 73–393)
LYMPHOCYTES # BLD AUTO: 2.49 THOUSANDS/ΜL (ref 0.6–4.47)
LYMPHOCYTES NFR BLD AUTO: 27 % (ref 14–44)
MCH RBC QN AUTO: 31.3 PG (ref 26.8–34.3)
MCHC RBC AUTO-ENTMCNC: 32.9 G/DL (ref 31.4–37.4)
MCV RBC AUTO: 95 FL (ref 82–98)
MONOCYTES # BLD AUTO: 0.61 THOUSAND/ΜL (ref 0.17–1.22)
MONOCYTES NFR BLD AUTO: 7 % (ref 4–12)
NEUTROPHILS # BLD AUTO: 6.04 THOUSANDS/ΜL (ref 1.85–7.62)
NEUTS SEG NFR BLD AUTO: 64 % (ref 43–75)
NONHDLC SERPL-MCNC: 151 MG/DL
NRBC BLD AUTO-RTO: 0 /100 WBCS
P AXIS: 74 DEGREES
PLATELET # BLD AUTO: 271 THOUSANDS/UL (ref 149–390)
PMV BLD AUTO: 8.6 FL (ref 8.9–12.7)
POTASSIUM SERPL-SCNC: 4.2 MMOL/L (ref 3.5–5.3)
PR INTERVAL: 158 MS
PROT SERPL-MCNC: 8.2 G/DL (ref 6.4–8.2)
QRS AXIS: 41 DEGREES
QRSD INTERVAL: 98 MS
QT INTERVAL: 376 MS
QTC INTERVAL: 431 MS
RBC # BLD AUTO: 5.12 MILLION/UL (ref 3.88–5.62)
SARS-COV-2 RNA RESP QL NAA+PROBE: NEGATIVE
SODIUM SERPL-SCNC: 138 MMOL/L (ref 136–145)
T WAVE AXIS: 55 DEGREES
TRIGL SERPL-MCNC: 76 MG/DL
TSH SERPL DL<=0.05 MIU/L-ACNC: 1.94 UIU/ML (ref 0.36–3.74)
VENTRICULAR RATE: 79 BPM
WBC # BLD AUTO: 9.33 THOUSAND/UL (ref 4.31–10.16)

## 2020-10-31 PROCEDURE — 80061 LIPID PANEL: CPT | Performed by: NURSE PRACTITIONER

## 2020-10-31 PROCEDURE — 93010 ELECTROCARDIOGRAM REPORT: CPT | Performed by: INTERNAL MEDICINE

## 2020-10-31 PROCEDURE — 83690 ASSAY OF LIPASE: CPT | Performed by: EMERGENCY MEDICINE

## 2020-10-31 PROCEDURE — 85025 COMPLETE CBC W/AUTO DIFF WBC: CPT | Performed by: EMERGENCY MEDICINE

## 2020-10-31 PROCEDURE — 84443 ASSAY THYROID STIM HORMONE: CPT | Performed by: NURSE PRACTITIONER

## 2020-10-31 PROCEDURE — 93005 ELECTROCARDIOGRAM TRACING: CPT

## 2020-10-31 PROCEDURE — 96361 HYDRATE IV INFUSION ADD-ON: CPT

## 2020-10-31 PROCEDURE — 96374 THER/PROPH/DIAG INJ IV PUSH: CPT

## 2020-10-31 PROCEDURE — 80320 DRUG SCREEN QUANTALCOHOLS: CPT | Performed by: NURSE PRACTITIONER

## 2020-10-31 PROCEDURE — 99284 EMERGENCY DEPT VISIT MOD MDM: CPT | Performed by: NURSE PRACTITIONER

## 2020-10-31 PROCEDURE — 80053 COMPREHEN METABOLIC PANEL: CPT | Performed by: EMERGENCY MEDICINE

## 2020-10-31 PROCEDURE — 99285 EMERGENCY DEPT VISIT HI MDM: CPT

## 2020-10-31 PROCEDURE — 87635 SARS-COV-2 COVID-19 AMP PRB: CPT | Performed by: NURSE PRACTITIONER

## 2020-10-31 PROCEDURE — 36415 COLL VENOUS BLD VENIPUNCTURE: CPT

## 2020-10-31 RX ORDER — SUCRALFATE ORAL 1 G/10ML
1000 SUSPENSION ORAL ONCE
Status: DISCONTINUED | OUTPATIENT
Start: 2020-10-31 | End: 2020-10-31

## 2020-10-31 RX ORDER — PANTOPRAZOLE SODIUM 40 MG/1
40 TABLET, DELAYED RELEASE ORAL DAILY
Qty: 30 TABLET | Refills: 0 | OUTPATIENT
Start: 2020-10-31 | End: 2021-10-07

## 2020-10-31 RX ORDER — LIDOCAINE HYDROCHLORIDE 20 MG/ML
10 SOLUTION OROPHARYNGEAL ONCE
Status: DISCONTINUED | OUTPATIENT
Start: 2020-10-31 | End: 2020-10-31 | Stop reason: HOSPADM

## 2020-10-31 RX ORDER — GABAPENTIN 100 MG/1
100 CAPSULE ORAL 3 TIMES DAILY
Qty: 90 CAPSULE | Refills: 0 | OUTPATIENT
Start: 2020-10-31 | End: 2021-10-07

## 2020-10-31 RX ORDER — LORAZEPAM 2 MG/ML
1 INJECTION INTRAMUSCULAR ONCE
Status: COMPLETED | OUTPATIENT
Start: 2020-10-31 | End: 2020-10-31

## 2020-10-31 RX ORDER — PANTOPRAZOLE SODIUM 40 MG/1
40 TABLET, DELAYED RELEASE ORAL ONCE
Status: COMPLETED | OUTPATIENT
Start: 2020-10-31 | End: 2020-10-31

## 2020-10-31 RX ORDER — SUCRALFATE 1 G/1
1 TABLET ORAL ONCE
Status: COMPLETED | OUTPATIENT
Start: 2020-10-31 | End: 2020-10-31

## 2020-10-31 RX ORDER — SUCRALFATE 1 G/1
1 TABLET ORAL 4 TIMES DAILY
Qty: 56 TABLET | Refills: 0 | OUTPATIENT
Start: 2020-10-31 | End: 2021-10-07

## 2020-10-31 RX ADMIN — SODIUM CHLORIDE 1000 ML: 0.9 INJECTION, SOLUTION INTRAVENOUS at 11:26

## 2020-10-31 RX ADMIN — PANTOPRAZOLE SODIUM 40 MG: 40 TABLET, DELAYED RELEASE ORAL at 12:29

## 2020-10-31 RX ADMIN — SUCRALFATE 1 G: 1 TABLET ORAL at 12:58

## 2020-10-31 RX ADMIN — LORAZEPAM 1 MG: 2 INJECTION INTRAMUSCULAR; INTRAVENOUS at 12:30

## 2021-07-10 ENCOUNTER — HOSPITAL ENCOUNTER (EMERGENCY)
Facility: HOSPITAL | Age: 71
Discharge: HOME | End: 2021-07-11
Attending: EMERGENCY MEDICINE
Payer: COMMERCIAL

## 2021-07-10 DIAGNOSIS — K29.70 GASTRITIS: ICD-10-CM

## 2021-07-10 DIAGNOSIS — F32.A DEPRESSION: Primary | ICD-10-CM

## 2021-07-10 DIAGNOSIS — F41.9 ANXIETY: ICD-10-CM

## 2021-07-10 LAB — ETHANOL EXG-MCNC: 0 MG/DL

## 2021-07-10 PROCEDURE — 82075 ASSAY OF BREATH ETHANOL: CPT | Performed by: EMERGENCY MEDICINE

## 2021-07-10 PROCEDURE — 99284 EMERGENCY DEPT VISIT MOD MDM: CPT

## 2021-07-10 PROCEDURE — 99284 EMERGENCY DEPT VISIT MOD MDM: CPT | Performed by: EMERGENCY MEDICINE

## 2021-07-10 RX ORDER — SUCRALFATE 1 G/1
1 TABLET ORAL 4 TIMES DAILY
Qty: 120 TABLET | Refills: 0 | OUTPATIENT
Start: 2021-07-10 | End: 2021-10-07

## 2021-07-10 RX ORDER — PANTOPRAZOLE SODIUM 20 MG/1
40 TABLET, DELAYED RELEASE ORAL DAILY
Qty: 60 TABLET | Refills: 0 | OUTPATIENT
Start: 2021-07-10 | End: 2021-10-07

## 2021-07-11 VITALS
DIASTOLIC BLOOD PRESSURE: 72 MMHG | WEIGHT: 185 LBS | RESPIRATION RATE: 18 BRPM | OXYGEN SATURATION: 100 % | TEMPERATURE: 98 F | HEART RATE: 80 BPM | BODY MASS INDEX: 26.54 KG/M2 | SYSTOLIC BLOOD PRESSURE: 118 MMHG

## 2021-07-11 RX ORDER — LIDOCAINE HYDROCHLORIDE 20 MG/ML
15 SOLUTION OROPHARYNGEAL ONCE
Status: COMPLETED | OUTPATIENT
Start: 2021-07-11 | End: 2021-07-11

## 2021-07-11 RX ORDER — MAGNESIUM HYDROXIDE/ALUMINUM HYDROXICE/SIMETHICONE 120; 1200; 1200 MG/30ML; MG/30ML; MG/30ML
30 SUSPENSION ORAL ONCE
Status: COMPLETED | OUTPATIENT
Start: 2021-07-11 | End: 2021-07-11

## 2021-07-11 RX ADMIN — LIDOCAINE HYDROCHLORIDE 15 ML: 20 SOLUTION ORAL; TOPICAL at 00:10

## 2021-07-11 RX ADMIN — ALUMINA, MAGNESIA, AND SIMETHICONE ORAL SUSPENSION REGULAR STRENGTH 30 ML: 1200; 1200; 120 SUSPENSION ORAL at 00:10

## 2021-07-11 NOTE — ED NOTES
Patient brought to emergency room via EMS due to abdominal pain past 3-4 days, and increased anxiety and depression  [facility] met with patient when medically clear  Patient is alert and oriented to person, place, time and situation  Patient reports increased anxiety past couple days due to stomach flare up and depression due to relationship breakup six weeks ago  Patient denies suicidal and homicidal ideation, visual hallucinations and delusions  Patient reports poor appetite due to nausea and reports that he has not been taking his medication for stomach ulcer  Patient experiencing poor sleep due to restlessness and auditory hallucinations of "static " Patient reports use of marijuana once/week  Patient currently has no outpatient services  Patient requesting medication for stomach pain and is interested in seeking outpatient treatment  Patient is able to contract for safety and does not meet criteria for inpatient behavioral health treatment  Provider in agreement with discharge with outpatient mental health resources

## 2021-07-11 NOTE — ED PROVIDER NOTES
History  Chief Complaint   Patient presents with    Psychiatric Evaluation     c/o anxiety and depression and would like to talk to crisis     72 yo male with h/o anxiety and depression who presents to ED for evaluation of anxiety and depression and requesting discussion w crisis  Denies SI and HI  Also c/o chronic abd pain from gastric ulcer  Was taking carafate which helped but stopped taking the other prescribed medicine for his gastritis/ulcer (does not recall the name of this medication)  Denies GI bleeding  Denies fever and vomiting  Denies back pain  Pain describes as aching, constant, chronic and epigastric in location  Prior to Admission Medications   Prescriptions Last Dose Informant Patient Reported? Taking?    albuterol (PROVENTIL HFA,VENTOLIN HFA) 90 mcg/act inhaler   No No   Sig: Inhale 2 puffs every 4 (four) hours as needed for wheezing   busPIRone (BUSPAR) 10 mg tablet   No No   Sig: Take 1 tablet (10 mg total) by mouth 2 (two) times a day   cholecalciferol (VITAMIN D3) 1,000 units tablet   No No   Sig: Take 1 tablet (1,000 Units total) by mouth daily   ergocalciferol (VITAMIN D2) 50,000 units   No No   Sig: Take 1 capsule (50,000 Units total) by mouth once a week for 6 doses   gabapentin (NEURONTIN) 100 mg capsule   No No   Sig: Take 1 capsule (100 mg total) by mouth 3 (three) times a day   gabapentin (NEURONTIN) 100 mg capsule   No No   Sig: Take 1 capsule (100 mg total) by mouth 3 (three) times a day   hydrocortisone 1 % cream   No No   Sig: Apply topically 4 (four) times a day as needed for irritation or rash   ondansetron (ZOFRAN-ODT) 4 mg disintegrating tablet   No No   Sig: Take 1 tablet (4 mg total) by mouth every 6 (six) hours as needed for nausea or vomiting   pantoprazole (PROTONIX) 40 mg tablet   No No   Sig: Take 1 tablet (40 mg total) by mouth 2 (two) times a day before meals   pantoprazole (PROTONIX) 40 mg tablet   No No   Sig: Take 1 tablet (40 mg total) by mouth daily polyethylene glycol (MIRALAX) 17 g   No No   Sig: Take 1 packet (17 g total) by mouth daily as needed (constipation refractory to Senokot-S)   senna-docusate sodium (SENOKOT S) 8 6-50 mg per tablet   No No   Sig: Take 1 tablet by mouth daily as needed for constipation   sucralfate (CARAFATE) 1 g tablet   No No   Sig: Take 1 tablet (1 g total) by mouth 4 (four) times a day   sucralfate (CARAFATE) 1 g tablet   No No   Sig: Take 1 tablet (1 g total) by mouth 4 (four) times a day for 14 days   traZODone (DESYREL) 50 mg tablet   No No   Sig: TAKE 1 TABLET BY MOUTH DAILY AT BEDTIME      Facility-Administered Medications: None       Past Medical History:   Diagnosis Date    Addiction to drug ([facility] [address] )     Asthma        Past Surgical History:   Procedure Laterality Date    ANKLE SURGERY      KNEE SURGERY      RIB FRACTURE SURGERY         History reviewed  No pertinent family history  I have reviewed and agree with the history as documented  E-Cigarette/Vaping    E-Cigarette Use Former User      E-Cigarette/Vaping Substances    Nicotine No     THC No     CBD No     Flavoring No     Other No     Unknown No      Social History     Tobacco Use    Smoking status: Former Smoker    Smokeless tobacco: Never Used   Vaping Use    Vaping Use: Former   Substance Use Topics    Alcohol use: Not Currently    Drug use: Yes     Types: Marijuana     Comment: Pt denies use of substances although ED UDS resulted +THC       Review of Systems   Gastrointestinal: Positive for abdominal pain  Psychiatric/Behavioral: Negative for self-injury and suicidal ideas  The patient is nervous/anxious  All other systems reviewed and are negative  Physical Exam  Physical Exam  Vitals and nursing note reviewed  Constitutional:       General: He is not in acute distress  Appearance: Normal appearance  He is well-developed  He is not ill-appearing, toxic-appearing or diaphoretic  HENT:      Head: Normocephalic and atraumatic  Eyes:      General:         Right eye: No discharge  Left eye: No discharge  Conjunctiva/sclera: Conjunctivae normal       Pupils: Pupils are equal, round, and reactive to light  Neck:      Vascular: No JVD  Pulmonary:      Effort: Pulmonary effort is normal  No respiratory distress  Breath sounds: No stridor  Abdominal:      General: Abdomen is flat  There is no distension  Palpations: There is no mass  Tenderness: There is no abdominal tenderness  There is no guarding or rebound  Hernia: No hernia is present  Musculoskeletal:         General: No swelling, tenderness, deformity or signs of injury  Normal range of motion  Cervical back: Normal range of motion and neck supple  No rigidity  Skin:     General: Skin is warm and dry  Capillary Refill: Capillary refill takes less than 2 seconds  Coloration: Skin is not pale  Findings: No erythema or rash  Neurological:      General: No focal deficit present  Mental Status: He is alert and oriented to person, place, and time  Cranial Nerves: No cranial nerve deficit  Sensory: No sensory deficit  Motor: No weakness or abnormal muscle tone        Coordination: Coordination normal          Vital Signs  ED Triage Vitals [07/10/21 2303]   Temperature Pulse Respirations Blood Pressure SpO2   98 °F (36 7 °C) 70 18 129/60 98 %      Temp Source Heart Rate Source Patient Position - Orthostatic VS BP Location FiO2 (%)   Oral Monitor Sitting Right arm --      Pain Score       --           Vitals:    07/10/21 2303 07/11/21 0035   BP: 129/60 118/72   Pulse: 70 80   Patient Position - Orthostatic VS: Sitting          Visual Acuity      ED Medications  Medications   aluminum-magnesium hydroxide-simethicone (MYLANTA) oral suspension 30 mL (30 mL Oral Given 7/11/21 0010)   Lidocaine Viscous HCl (XYLOCAINE) 2 % mucosal solution 15 mL (15 mL Swish & Swallow Given 7/11/21 0010)       Diagnostic Studies  Results Reviewed     Procedure Component Value Units Date/Time    POCT alcohol breath test [[id number]]  (Normal) Resulted: 07/10/21 2303    Lab Status: Final result Updated: 07/10/21 2303     EXTBreath Alcohol 0 00    CBC and differential [[id number]]     Lab Status: No result Specimen: Blood     Comprehensive metabolic panel [[id number]]     Lab Status: No result Specimen: Blood     TSH [[id number]]     Lab Status: No result Specimen: Blood     UA w Reflex to Microscopic w Reflex to Culture [[id number]]     Lab Status: No result Specimen: Urine     Rapid drug screen, urine [[id number]]     Lab Status: No result Specimen: Urine     Novel Coronavirus (Covid-19),PCR [facility] - 2 hour stat [[id number]]     Lab Status: No result Specimen: Nares from Nose                  No orders to display              Procedures  Procedures         ED Course  ED Course as of Jul 11 0147   Sat Jul 10, 2021   2338 Seen by crisis, does not want to sign in  Wants to go home  No SI or HI  Will d/c home, outpt resources provided  MDM  Number of Diagnoses or Management Options  Diagnosis management comments: Pt is medically cleared for inpatient psychiatric admission  Disposition  Final diagnoses:   Depression   Anxiety   Gastritis     Time reflects when diagnosis was documented in both MDM as applicable and the Disposition within this note     Time User Action Codes Description Comment    7/10/2021 11:41 PM Windy Field Add [F32 9] Depression     7/10/2021 11:41 PM Windy Field Add [F41 9] Anxiety     7/10/2021 11:41 PM Windy Field Add [K29 70] Gastritis       ED Disposition     ED Disposition Condition Date/Time Comment    Discharge Stable Sat Jul 10, 2021 11:41 PM Tuan Hernandez discharge to home/self care              MD Documentation      Most Recent Value   Sending MD  Dr Nya Echeverria MD      Follow-up Information     Follow up With Specialties Details Why Contact Info Additional [address] [facility] Emergency Department Emergency Medicine  If symptoms worsen [facility] [address] [place] [address] Emergency Department, [address] [place] [address], [place], [place], [address]          Discharge Medication List as of 7/10/2021 11:43 PM      CONTINUE these medications which have CHANGED    Details   ! ! pantoprazole (PROTONIX) 20 mg tablet Take 2 tablets (40 mg total) by mouth daily, Starting Sat 7/10/2021, Print      !! sucralfate (CARAFATE) 1 g tablet Take 1 tablet (1 g total) by mouth 4 (four) times a day, Starting Sat 7/10/2021, Print       !! - Potential duplicate medications found  Please discuss with provider        CONTINUE these medications which have NOT CHANGED    Details   albuterol (PROVENTIL HFA,VENTOLIN HFA) 90 mcg/act inhaler Inhale 2 puffs every 4 (four) hours as needed for wheezing, Starting Mon 8/31/2020, Normal      busPIRone (BUSPAR) 10 mg tablet Take 1 tablet (10 mg total) by mouth 2 (two) times a day, Starting Tue 9/1/2020, Normal      cholecalciferol (VITAMIN D3) 1,000 units tablet Take 1 tablet (1,000 Units total) by mouth daily, Starting Fri 9/11/2020, Normal      ergocalciferol (VITAMIN D2) 50,000 units Take 1 capsule (50,000 Units total) by mouth once a week for 6 doses, Starting Fri 9/4/2020, Until Sat 10/10/2020, Normal      gabapentin (NEURONTIN) 100 mg capsule Take 1 capsule (100 mg total) by mouth 3 (three) times a day, Starting Mon 8/31/2020, Normal      hydrocortisone 1 % cream Apply topically 4 (four) times a day as needed for irritation or rash, Starting Mon 8/31/2020, Normal      ondansetron (ZOFRAN-ODT) 4 mg disintegrating tablet Take 1 tablet (4 mg total) by mouth every 6 (six) hours as needed for nausea or vomiting, Starting Mon 8/31/2020, Print      !! pantoprazole (PROTONIX) 40 mg tablet Take 1 tablet (40 mg total) by mouth 2 (two) times a day before meals, Starting Mon 8/31/2020, Normal      polyethylene glycol (MIRALAX) 17 g Take 1 packet (17 g total) by mouth daily as needed (constipation refractory to Senokot-S), Starting Mon 8/31/2020, Normal      senna-docusate sodium (SENOKOT S) 8 6-50 mg per tablet Take 1 tablet by mouth daily as needed for constipation, Starting Mon 8/31/2020, Normal      !! sucralfate (CARAFATE) 1 g tablet Take 1 tablet (1 g total) by mouth 4 (four) times a day, Starting Mon 8/31/2020, Normal      traZODone (DESYREL) 50 mg tablet TAKE 1 TABLET BY MOUTH DAILY AT BEDTIME, Normal       !! - Potential duplicate medications found  Please discuss with provider  No discharge procedures on file      PDMP Review     None          ED Provider  Electronically Signed by           Ziyad Carr MD  07/11/21 5362

## 2021-07-26 NOTE — PROGRESS NOTES
How Severe Is Your Skin Lesion?: mild Pt up ad sally & gait is steady  Pt c/o occasional heartburn & received Carafate & Protonix po as ordered by MD  Pt denies any hallucinations, suicidal or homicidal ideations  Q 7 min checks maintained to monitor pt's behavior & safety  Pt c/o depression 10/10 & anxiety 10/10  Pt is cooperative & compliant with medications  Pt c/o occasional generalized back & bilateral knee pain relieved by daily dose of Methadone 85 mg po  Has Your Skin Lesion Been Treated?: not been treated Is This A New Presentation, Or A Follow-Up?: Skin Lesion

## 2021-10-06 ENCOUNTER — APPOINTMENT (EMERGENCY)
Dept: CT IMAGING | Facility: HOSPITAL | Age: 71
DRG: 177 | End: 2021-10-06
Payer: COMMERCIAL

## 2021-10-06 ENCOUNTER — HOSPITAL ENCOUNTER (INPATIENT)
Facility: HOSPITAL | Age: 71
LOS: 1 days | Discharge: HOME | DRG: 177 | End: 2021-10-07
Attending: EMERGENCY MEDICINE | Admitting: STUDENT IN AN ORGANIZED HEALTH CARE EDUCATION/TRAINING PROGRAM
Payer: COMMERCIAL

## 2021-10-06 ENCOUNTER — APPOINTMENT (EMERGENCY)
Dept: RADIOLOGY | Facility: HOSPITAL | Age: 71
DRG: 177 | End: 2021-10-06
Payer: COMMERCIAL

## 2021-10-06 ENCOUNTER — APPOINTMENT (EMERGENCY)
Dept: ULTRASOUND IMAGING | Facility: HOSPITAL | Age: 71
DRG: 177 | End: 2021-10-06
Payer: COMMERCIAL

## 2021-10-06 DIAGNOSIS — K80.20 GALLSTONES: ICD-10-CM

## 2021-10-06 DIAGNOSIS — J45.909 ASTHMA: ICD-10-CM

## 2021-10-06 DIAGNOSIS — J12.82 PNEUMONIA DUE TO COVID-19 VIRUS: Primary | ICD-10-CM

## 2021-10-06 DIAGNOSIS — U07.1 PNEUMONIA DUE TO COVID-19 VIRUS: Primary | ICD-10-CM

## 2021-10-06 DIAGNOSIS — R42 VERTIGO: ICD-10-CM

## 2021-10-06 DIAGNOSIS — I63.9 CVA (CEREBRAL VASCULAR ACCIDENT) (HCC): ICD-10-CM

## 2021-10-06 PROBLEM — D69.6 THROMBOCYTOPENIA (HCC): Status: ACTIVE | Noted: 2021-10-06

## 2021-10-06 PROBLEM — R93.89 ABNORMAL CT SCAN: Status: ACTIVE | Noted: 2021-10-06

## 2021-10-06 PROBLEM — E87.2 LACTIC ACIDOSIS: Status: ACTIVE | Noted: 2021-10-06

## 2021-10-06 PROBLEM — R10.11 RIGHT UPPER QUADRANT ABDOMINAL PAIN: Status: ACTIVE | Noted: 2021-10-06

## 2021-10-06 LAB
ALBUMIN SERPL BCP-MCNC: 3.4 G/DL (ref 3.5–5)
ALP SERPL-CCNC: 78 U/L (ref 46–116)
ALT SERPL W P-5'-P-CCNC: 42 U/L (ref 12–78)
ANION GAP SERPL CALCULATED.3IONS-SCNC: 9 MMOL/L (ref 4–13)
APTT PPP: 30 SECONDS (ref 23–37)
AST SERPL W P-5'-P-CCNC: 34 U/L (ref 5–45)
ATRIAL RATE: 63 BPM
BACTERIA UR QL AUTO: NORMAL /HPF
BASOPHILS # BLD AUTO: 0.02 THOUSANDS/ΜL (ref 0–0.1)
BASOPHILS NFR BLD AUTO: 0 % (ref 0–1)
BILIRUB DIRECT SERPL-MCNC: 0.2 MG/DL (ref 0–0.2)
BILIRUB SERPL-MCNC: 0.61 MG/DL (ref 0.2–1)
BILIRUB UR QL STRIP: NEGATIVE
BUN SERPL-MCNC: 18 MG/DL (ref 5–25)
CALCIUM SERPL-MCNC: 8.8 MG/DL (ref 8.3–10.1)
CHLORIDE SERPL-SCNC: 97 MMOL/L (ref 100–108)
CLARITY UR: CLEAR
CO2 SERPL-SCNC: 29 MMOL/L (ref 21–32)
COLOR UR: YELLOW
CREAT SERPL-MCNC: 1.18 MG/DL (ref 0.6–1.3)
EOSINOPHIL # BLD AUTO: 0 THOUSAND/ΜL (ref 0–0.61)
EOSINOPHIL NFR BLD AUTO: 0 % (ref 0–6)
ERYTHROCYTE [DISTWIDTH] IN BLOOD BY AUTOMATED COUNT: 12.7 % (ref 11.6–15.1)
FLUAV RNA RESP QL NAA+PROBE: NEGATIVE
FLUBV RNA RESP QL NAA+PROBE: NEGATIVE
GFR SERPL CREATININE-BSD FRML MDRD: 63 ML/MIN/1.73SQ M
GLUCOSE SERPL-MCNC: 88 MG/DL (ref 65–140)
GLUCOSE UR STRIP-MCNC: NEGATIVE MG/DL
HCT VFR BLD AUTO: 44.4 % (ref 36.5–49.3)
HGB BLD-MCNC: 15.4 G/DL (ref 12–17)
HGB UR QL STRIP.AUTO: ABNORMAL
IMM GRANULOCYTES # BLD AUTO: 0.01 THOUSAND/UL (ref 0–0.2)
IMM GRANULOCYTES NFR BLD AUTO: 0 % (ref 0–2)
INR PPP: 0.99 (ref 0.84–1.19)
KETONES UR STRIP-MCNC: ABNORMAL MG/DL
LACTATE SERPL-SCNC: 1.3 MMOL/L (ref 0.5–2)
LACTATE SERPL-SCNC: 2.5 MMOL/L (ref 0.5–2)
LEUKOCYTE ESTERASE UR QL STRIP: NEGATIVE
LIPASE SERPL-CCNC: 84 U/L (ref 73–393)
LYMPHOCYTES # BLD AUTO: 0.91 THOUSANDS/ΜL (ref 0.6–4.47)
LYMPHOCYTES NFR BLD AUTO: 19 % (ref 14–44)
MAGNESIUM SERPL-MCNC: 1.8 MG/DL (ref 1.6–2.6)
MCH RBC QN AUTO: 32.6 PG (ref 26.8–34.3)
MCHC RBC AUTO-ENTMCNC: 34.7 G/DL (ref 31.4–37.4)
MCV RBC AUTO: 94 FL (ref 82–98)
MONOCYTES # BLD AUTO: 0.84 THOUSAND/ΜL (ref 0.17–1.22)
MONOCYTES NFR BLD AUTO: 17 % (ref 4–12)
NEUTROPHILS # BLD AUTO: 3.15 THOUSANDS/ΜL (ref 1.85–7.62)
NEUTS SEG NFR BLD AUTO: 64 % (ref 43–75)
NITRITE UR QL STRIP: NEGATIVE
NON-SQ EPI CELLS URNS QL MICRO: NORMAL /HPF
NRBC BLD AUTO-RTO: 0 /100 WBCS
NT-PROBNP SERPL-MCNC: 109 PG/ML
P AXIS: 83 DEGREES
PH UR STRIP.AUTO: 7 [PH]
PLATELET # BLD AUTO: 143 THOUSANDS/UL (ref 149–390)
PMV BLD AUTO: 8.9 FL (ref 8.9–12.7)
POTASSIUM SERPL-SCNC: 4.6 MMOL/L (ref 3.5–5.3)
PR INTERVAL: 152 MS
PROT SERPL-MCNC: 7.4 G/DL (ref 6.4–8.2)
PROT UR STRIP-MCNC: NEGATIVE MG/DL
PROTHROMBIN TIME: 12.7 SECONDS (ref 11.6–14.5)
QRS AXIS: 54 DEGREES
QRSD INTERVAL: 88 MS
QT INTERVAL: 434 MS
QTC INTERVAL: 444 MS
RBC # BLD AUTO: 4.73 MILLION/UL (ref 3.88–5.62)
RBC #/AREA URNS AUTO: NORMAL /HPF
RSV RNA RESP QL NAA+PROBE: NEGATIVE
SARS-COV-2 RNA RESP QL NAA+PROBE: POSITIVE
SODIUM SERPL-SCNC: 135 MMOL/L (ref 136–145)
SP GR UR STRIP.AUTO: <=1.005 (ref 1–1.03)
T WAVE AXIS: 50 DEGREES
TROPONIN I SERPL-MCNC: <0.02 NG/ML
UROBILINOGEN UR QL STRIP.AUTO: 1 E.U./DL
VENTRICULAR RATE: 63 BPM
WBC # BLD AUTO: 4.93 THOUSAND/UL (ref 4.31–10.16)
WBC #/AREA URNS AUTO: NORMAL /HPF

## 2021-10-06 PROCEDURE — 99285 EMERGENCY DEPT VISIT HI MDM: CPT

## 2021-10-06 PROCEDURE — 83605 ASSAY OF LACTIC ACID: CPT | Performed by: EMERGENCY MEDICINE

## 2021-10-06 PROCEDURE — 70496 CT ANGIOGRAPHY HEAD: CPT

## 2021-10-06 PROCEDURE — 84484 ASSAY OF TROPONIN QUANT: CPT | Performed by: PHYSICIAN ASSISTANT

## 2021-10-06 PROCEDURE — 96375 TX/PRO/DX INJ NEW DRUG ADDON: CPT

## 2021-10-06 PROCEDURE — 96374 THER/PROPH/DIAG INJ IV PUSH: CPT

## 2021-10-06 PROCEDURE — 76705 ECHO EXAM OF ABDOMEN: CPT

## 2021-10-06 PROCEDURE — 99223 1ST HOSP IP/OBS HIGH 75: CPT | Performed by: PHYSICIAN ASSISTANT

## 2021-10-06 PROCEDURE — 83690 ASSAY OF LIPASE: CPT | Performed by: EMERGENCY MEDICINE

## 2021-10-06 PROCEDURE — 70498 CT ANGIOGRAPHY NECK: CPT

## 2021-10-06 PROCEDURE — 71260 CT THORAX DX C+: CPT

## 2021-10-06 PROCEDURE — 84484 ASSAY OF TROPONIN QUANT: CPT | Performed by: EMERGENCY MEDICINE

## 2021-10-06 PROCEDURE — 36415 COLL VENOUS BLD VENIPUNCTURE: CPT | Performed by: EMERGENCY MEDICINE

## 2021-10-06 PROCEDURE — 85730 THROMBOPLASTIN TIME PARTIAL: CPT | Performed by: EMERGENCY MEDICINE

## 2021-10-06 PROCEDURE — 93005 ELECTROCARDIOGRAM TRACING: CPT

## 2021-10-06 PROCEDURE — 96361 HYDRATE IV INFUSION ADD-ON: CPT

## 2021-10-06 PROCEDURE — 85610 PROTHROMBIN TIME: CPT | Performed by: EMERGENCY MEDICINE

## 2021-10-06 PROCEDURE — 99285 EMERGENCY DEPT VISIT HI MDM: CPT | Performed by: EMERGENCY MEDICINE

## 2021-10-06 PROCEDURE — 80048 BASIC METABOLIC PNL TOTAL CA: CPT | Performed by: EMERGENCY MEDICINE

## 2021-10-06 PROCEDURE — 74177 CT ABD & PELVIS W/CONTRAST: CPT

## 2021-10-06 PROCEDURE — 83735 ASSAY OF MAGNESIUM: CPT | Performed by: EMERGENCY MEDICINE

## 2021-10-06 PROCEDURE — 93010 ELECTROCARDIOGRAM REPORT: CPT | Performed by: INTERNAL MEDICINE

## 2021-10-06 PROCEDURE — 85025 COMPLETE CBC W/AUTO DIFF WBC: CPT | Performed by: EMERGENCY MEDICINE

## 2021-10-06 PROCEDURE — 83880 ASSAY OF NATRIURETIC PEPTIDE: CPT | Performed by: EMERGENCY MEDICINE

## 2021-10-06 PROCEDURE — 71045 X-RAY EXAM CHEST 1 VIEW: CPT

## 2021-10-06 PROCEDURE — 80076 HEPATIC FUNCTION PANEL: CPT | Performed by: EMERGENCY MEDICINE

## 2021-10-06 PROCEDURE — 94640 AIRWAY INHALATION TREATMENT: CPT

## 2021-10-06 PROCEDURE — 0241U HB NFCT DS VIR RESP RNA 4 TRGT: CPT | Performed by: EMERGENCY MEDICINE

## 2021-10-06 PROCEDURE — 81001 URINALYSIS AUTO W/SCOPE: CPT | Performed by: EMERGENCY MEDICINE

## 2021-10-06 RX ORDER — PANTOPRAZOLE SODIUM 40 MG/1
40 TABLET, DELAYED RELEASE ORAL
Status: DISCONTINUED | OUTPATIENT
Start: 2021-10-07 | End: 2021-10-07 | Stop reason: HOSPADM

## 2021-10-06 RX ORDER — IBUPROFEN 400 MG/1
400 TABLET ORAL EVERY 6 HOURS PRN
Status: DISCONTINUED | OUTPATIENT
Start: 2021-10-07 | End: 2021-10-07 | Stop reason: HOSPADM

## 2021-10-06 RX ORDER — ONDANSETRON 2 MG/ML
4 INJECTION INTRAMUSCULAR; INTRAVENOUS EVERY 6 HOURS PRN
Status: DISCONTINUED | OUTPATIENT
Start: 2021-10-06 | End: 2021-10-07 | Stop reason: HOSPADM

## 2021-10-06 RX ORDER — LIDOCAINE HYDROCHLORIDE 20 MG/ML
15 SOLUTION OROPHARYNGEAL 3 TIMES DAILY PRN
Status: DISCONTINUED | OUTPATIENT
Start: 2021-10-06 | End: 2021-10-07 | Stop reason: HOSPADM

## 2021-10-06 RX ORDER — KETOROLAC TROMETHAMINE 30 MG/ML
15 INJECTION, SOLUTION INTRAMUSCULAR; INTRAVENOUS ONCE
Status: COMPLETED | OUTPATIENT
Start: 2021-10-06 | End: 2021-10-06

## 2021-10-06 RX ORDER — HEPARIN SODIUM 5000 [USP'U]/ML
5000 INJECTION, SOLUTION INTRAVENOUS; SUBCUTANEOUS EVERY 8 HOURS SCHEDULED
Status: DISCONTINUED | OUTPATIENT
Start: 2021-10-07 | End: 2021-10-07 | Stop reason: HOSPADM

## 2021-10-06 RX ORDER — MECLIZINE HYDROCHLORIDE 25 MG/1
25 TABLET ORAL ONCE
Status: COMPLETED | OUTPATIENT
Start: 2021-10-06 | End: 2021-10-06

## 2021-10-06 RX ORDER — ATORVASTATIN CALCIUM 40 MG/1
40 TABLET, FILM COATED ORAL EVERY EVENING
Status: DISCONTINUED | OUTPATIENT
Start: 2021-10-07 | End: 2021-10-07 | Stop reason: HOSPADM

## 2021-10-06 RX ORDER — TRAZODONE HYDROCHLORIDE 50 MG/1
50 TABLET ORAL
Status: DISCONTINUED | OUTPATIENT
Start: 2021-10-06 | End: 2021-10-07 | Stop reason: HOSPADM

## 2021-10-06 RX ORDER — GABAPENTIN 300 MG/1
300 CAPSULE ORAL 3 TIMES DAILY
COMMUNITY

## 2021-10-06 RX ORDER — METHYLPREDNISOLONE SODIUM SUCCINATE 125 MG/2ML
80 INJECTION, POWDER, LYOPHILIZED, FOR SOLUTION INTRAMUSCULAR; INTRAVENOUS ONCE
Status: COMPLETED | OUTPATIENT
Start: 2021-10-06 | End: 2021-10-06

## 2021-10-06 RX ORDER — MELATONIN
1000 DAILY
Status: DISCONTINUED | OUTPATIENT
Start: 2021-10-07 | End: 2021-10-07 | Stop reason: HOSPADM

## 2021-10-06 RX ORDER — IPRATROPIUM BROMIDE AND ALBUTEROL SULFATE 2.5; .5 MG/3ML; MG/3ML
3 SOLUTION RESPIRATORY (INHALATION) ONCE
Status: COMPLETED | OUTPATIENT
Start: 2021-10-06 | End: 2021-10-06

## 2021-10-06 RX ORDER — SUCRALFATE 1 G/1
1 TABLET ORAL 4 TIMES DAILY
Status: DISCONTINUED | OUTPATIENT
Start: 2021-10-06 | End: 2021-10-07 | Stop reason: HOSPADM

## 2021-10-06 RX ORDER — GABAPENTIN 100 MG/1
100 CAPSULE ORAL 3 TIMES DAILY
Status: DISCONTINUED | OUTPATIENT
Start: 2021-10-06 | End: 2021-10-07 | Stop reason: HOSPADM

## 2021-10-06 RX ORDER — MAGNESIUM HYDROXIDE/ALUMINUM HYDROXICE/SIMETHICONE 120; 1200; 1200 MG/30ML; MG/30ML; MG/30ML
30 SUSPENSION ORAL EVERY 6 HOURS PRN
Status: DISCONTINUED | OUTPATIENT
Start: 2021-10-06 | End: 2021-10-07 | Stop reason: HOSPADM

## 2021-10-06 RX ORDER — ALBUTEROL SULFATE 90 UG/1
2 AEROSOL, METERED RESPIRATORY (INHALATION) EVERY 4 HOURS PRN
Status: DISCONTINUED | OUTPATIENT
Start: 2021-10-06 | End: 2021-10-07 | Stop reason: HOSPADM

## 2021-10-06 RX ORDER — MECLIZINE HCL 12.5 MG/1
12.5 TABLET ORAL EVERY 8 HOURS PRN
Status: DISCONTINUED | OUTPATIENT
Start: 2021-10-07 | End: 2021-10-07 | Stop reason: HOSPADM

## 2021-10-06 RX ORDER — BUSPIRONE HYDROCHLORIDE 10 MG/1
10 TABLET ORAL 2 TIMES DAILY
Status: DISCONTINUED | OUTPATIENT
Start: 2021-10-06 | End: 2021-10-07 | Stop reason: HOSPADM

## 2021-10-06 RX ORDER — ONDANSETRON 2 MG/ML
4 INJECTION INTRAMUSCULAR; INTRAVENOUS ONCE
Status: COMPLETED | OUTPATIENT
Start: 2021-10-06 | End: 2021-10-06

## 2021-10-06 RX ORDER — METOCLOPRAMIDE HYDROCHLORIDE 5 MG/ML
5 INJECTION INTRAMUSCULAR; INTRAVENOUS EVERY 6 HOURS PRN
Status: DISCONTINUED | OUTPATIENT
Start: 2021-10-06 | End: 2021-10-07 | Stop reason: HOSPADM

## 2021-10-06 RX ORDER — AMOXICILLIN 250 MG
1 CAPSULE ORAL DAILY PRN
Status: DISCONTINUED | OUTPATIENT
Start: 2021-10-06 | End: 2021-10-07 | Stop reason: HOSPADM

## 2021-10-06 RX ADMIN — MECLIZINE HYDROCHLORIDE 25 MG: 25 TABLET ORAL at 16:21

## 2021-10-06 RX ADMIN — TRAZODONE HYDROCHLORIDE 50 MG: 50 TABLET ORAL at 21:28

## 2021-10-06 RX ADMIN — IPRATROPIUM BROMIDE AND ALBUTEROL SULFATE 3 ML: 2.5; .5 SOLUTION RESPIRATORY (INHALATION) at 16:21

## 2021-10-06 RX ADMIN — METHYLPREDNISOLONE SODIUM SUCCINATE 80 MG: 125 INJECTION, POWDER, FOR SOLUTION INTRAMUSCULAR; INTRAVENOUS at 16:22

## 2021-10-06 RX ADMIN — ONDANSETRON 4 MG: 2 INJECTION INTRAMUSCULAR; INTRAVENOUS at 16:21

## 2021-10-06 RX ADMIN — SODIUM CHLORIDE 1000 ML: 0.9 INJECTION, SOLUTION INTRAVENOUS at 16:25

## 2021-10-06 RX ADMIN — SUCRALFATE 1 G: 1 TABLET ORAL at 21:28

## 2021-10-06 RX ADMIN — BUSPIRONE HYDROCHLORIDE 10 MG: 10 TABLET ORAL at 21:28

## 2021-10-06 RX ADMIN — IOHEXOL 100 ML: 350 INJECTION, SOLUTION INTRAVENOUS at 17:00

## 2021-10-06 RX ADMIN — KETOROLAC TROMETHAMINE 15 MG: 30 INJECTION, SOLUTION INTRAMUSCULAR at 20:10

## 2021-10-07 ENCOUNTER — APPOINTMENT (INPATIENT)
Dept: NON INVASIVE DIAGNOSTICS | Facility: HOSPITAL | Age: 71
DRG: 177 | End: 2021-10-07
Payer: COMMERCIAL

## 2021-10-07 ENCOUNTER — TELEPHONE (OUTPATIENT)
Dept: FAMILY MEDICINE CLINIC | Facility: CLINIC | Age: 71
End: 2021-10-07

## 2021-10-07 VITALS
BODY MASS INDEX: 27.46 KG/M2 | DIASTOLIC BLOOD PRESSURE: 73 MMHG | TEMPERATURE: 99.2 F | HEART RATE: 75 BPM | WEIGHT: 191.36 LBS | SYSTOLIC BLOOD PRESSURE: 142 MMHG | RESPIRATION RATE: 19 BRPM | OXYGEN SATURATION: 98 %

## 2021-10-07 PROBLEM — R55 VASOVAGAL EPISODE: Status: ACTIVE | Noted: 2021-10-06

## 2021-10-07 LAB
ALBUMIN SERPL BCP-MCNC: 2.9 G/DL (ref 3.5–5)
ALP SERPL-CCNC: 70 U/L (ref 46–116)
ALT SERPL W P-5'-P-CCNC: 35 U/L (ref 12–78)
ANION GAP SERPL CALCULATED.3IONS-SCNC: 6 MMOL/L (ref 4–13)
AST SERPL W P-5'-P-CCNC: 30 U/L (ref 5–45)
ATRIAL RATE: 68 BPM
BASOPHILS # BLD AUTO: 0 THOUSANDS/ΜL (ref 0–0.1)
BASOPHILS NFR BLD AUTO: 0 % (ref 0–1)
BILIRUB SERPL-MCNC: 0.41 MG/DL (ref 0.2–1)
BUN SERPL-MCNC: 20 MG/DL (ref 5–25)
CALCIUM ALBUM COR SERPL-MCNC: 9.1 MG/DL (ref 8.3–10.1)
CALCIUM SERPL-MCNC: 8.2 MG/DL (ref 8.3–10.1)
CHLORIDE SERPL-SCNC: 99 MMOL/L (ref 100–108)
CHOLEST SERPL-MCNC: 195 MG/DL (ref 50–200)
CK SERPL-CCNC: 60 U/L (ref 39–308)
CO2 SERPL-SCNC: 31 MMOL/L (ref 21–32)
CREAT SERPL-MCNC: 1.1 MG/DL (ref 0.6–1.3)
CRP SERPL QL: 4.6 MG/L
EOSINOPHIL # BLD AUTO: 0 THOUSAND/ΜL (ref 0–0.61)
EOSINOPHIL NFR BLD AUTO: 0 % (ref 0–6)
ERYTHROCYTE [DISTWIDTH] IN BLOOD BY AUTOMATED COUNT: 12.7 % (ref 11.6–15.1)
EST. AVERAGE GLUCOSE BLD GHB EST-MCNC: 105 MG/DL
GFR SERPL CREATININE-BSD FRML MDRD: 68 ML/MIN/1.73SQ M
GLUCOSE SERPL-MCNC: 125 MG/DL (ref 65–140)
HBA1C MFR BLD: 5.3 %
HCT VFR BLD AUTO: 41.1 % (ref 36.5–49.3)
HDLC SERPL-MCNC: 45 MG/DL
HGB BLD-MCNC: 14.1 G/DL (ref 12–17)
IMM GRANULOCYTES # BLD AUTO: 0.01 THOUSAND/UL (ref 0–0.2)
IMM GRANULOCYTES NFR BLD AUTO: 0 % (ref 0–2)
LDLC SERPL CALC-MCNC: 133 MG/DL (ref 0–100)
LYMPHOCYTES # BLD AUTO: 0.62 THOUSANDS/ΜL (ref 0.6–4.47)
LYMPHOCYTES NFR BLD AUTO: 21 % (ref 14–44)
MCH RBC QN AUTO: 32.4 PG (ref 26.8–34.3)
MCHC RBC AUTO-ENTMCNC: 34.3 G/DL (ref 31.4–37.4)
MCV RBC AUTO: 95 FL (ref 82–98)
MONOCYTES # BLD AUTO: 0.22 THOUSAND/ΜL (ref 0.17–1.22)
MONOCYTES NFR BLD AUTO: 7 % (ref 4–12)
NEUTROPHILS # BLD AUTO: 2.14 THOUSANDS/ΜL (ref 1.85–7.62)
NEUTS SEG NFR BLD AUTO: 72 % (ref 43–75)
NRBC BLD AUTO-RTO: 0 /100 WBCS
P AXIS: 55 DEGREES
PLATELET # BLD AUTO: 127 THOUSANDS/UL (ref 149–390)
PMV BLD AUTO: 9 FL (ref 8.9–12.7)
POTASSIUM SERPL-SCNC: 5 MMOL/L (ref 3.5–5.3)
PR INTERVAL: 162 MS
PROT SERPL-MCNC: 6.7 G/DL (ref 6.4–8.2)
QRS AXIS: 73 DEGREES
QRSD INTERVAL: 94 MS
QT INTERVAL: 390 MS
QTC INTERVAL: 414 MS
RBC # BLD AUTO: 4.35 MILLION/UL (ref 3.88–5.62)
SODIUM SERPL-SCNC: 136 MMOL/L (ref 136–145)
T WAVE AXIS: 21 DEGREES
TRIGL SERPL-MCNC: 85 MG/DL
VENTRICULAR RATE: 68 BPM
WBC # BLD AUTO: 2.99 THOUSAND/UL (ref 4.31–10.16)

## 2021-10-07 PROCEDURE — 36415 COLL VENOUS BLD VENIPUNCTURE: CPT | Performed by: PHYSICIAN ASSISTANT

## 2021-10-07 PROCEDURE — 83036 HEMOGLOBIN GLYCOSYLATED A1C: CPT | Performed by: PHYSICIAN ASSISTANT

## 2021-10-07 PROCEDURE — 99222 1ST HOSP IP/OBS MODERATE 55: CPT | Performed by: PHYSICIAN ASSISTANT

## 2021-10-07 PROCEDURE — 82550 ASSAY OF CK (CPK): CPT | Performed by: PHYSICIAN ASSISTANT

## 2021-10-07 PROCEDURE — 93010 ELECTROCARDIOGRAM REPORT: CPT | Performed by: INTERNAL MEDICINE

## 2021-10-07 PROCEDURE — 80061 LIPID PANEL: CPT | Performed by: PHYSICIAN ASSISTANT

## 2021-10-07 PROCEDURE — 80053 COMPREHEN METABOLIC PANEL: CPT | Performed by: PHYSICIAN ASSISTANT

## 2021-10-07 PROCEDURE — 85025 COMPLETE CBC W/AUTO DIFF WBC: CPT | Performed by: PHYSICIAN ASSISTANT

## 2021-10-07 PROCEDURE — 86140 C-REACTIVE PROTEIN: CPT | Performed by: PHYSICIAN ASSISTANT

## 2021-10-07 PROCEDURE — 99239 HOSP IP/OBS DSCHRG MGMT >30: CPT | Performed by: STUDENT IN AN ORGANIZED HEALTH CARE EDUCATION/TRAINING PROGRAM

## 2021-10-07 RX ORDER — CLOPIDOGREL BISULFATE 75 MG/1
75 TABLET ORAL DAILY
Qty: 30 TABLET | Refills: 0 | Status: SHIPPED | OUTPATIENT
Start: 2021-10-07

## 2021-10-07 RX ORDER — BUDESONIDE 0.5 MG/2ML
0.5 INHALANT ORAL 2 TIMES DAILY
Qty: 60 ML | Refills: 0 | Status: SHIPPED | OUTPATIENT
Start: 2021-10-07 | End: 2021-10-22

## 2021-10-07 RX ORDER — ATORVASTATIN CALCIUM 40 MG/1
40 TABLET, FILM COATED ORAL EVERY EVENING
Qty: 30 TABLET | Refills: 0 | Status: SHIPPED | OUTPATIENT
Start: 2021-10-07

## 2021-10-07 RX ORDER — ALBUTEROL SULFATE 2.5 MG/3ML
2.5 SOLUTION RESPIRATORY (INHALATION) EVERY 6 HOURS PRN
Qty: 60 ML | Refills: 0 | Status: SHIPPED | OUTPATIENT
Start: 2021-10-07

## 2021-10-07 RX ADMIN — METHADONE HYDROCHLORIDE 85 MG: 10 TABLET ORAL at 11:19

## 2021-10-07 RX ADMIN — Medication 1000 UNITS: at 09:43

## 2021-10-07 RX ADMIN — BUSPIRONE HYDROCHLORIDE 10 MG: 10 TABLET ORAL at 09:43

## 2021-10-07 RX ADMIN — PANTOPRAZOLE SODIUM 40 MG: 40 TABLET, DELAYED RELEASE ORAL at 09:43

## 2021-10-07 RX ADMIN — HEPARIN SODIUM 5000 UNITS: 5000 INJECTION INTRAVENOUS; SUBCUTANEOUS at 09:43

## 2021-10-07 RX ADMIN — SUCRALFATE 1 G: 1 TABLET ORAL at 09:43

## 2021-10-07 RX ADMIN — ONDANSETRON 4 MG: 2 INJECTION INTRAMUSCULAR; INTRAVENOUS at 11:28

## 2021-10-10 ENCOUNTER — APPOINTMENT (INPATIENT)
Dept: CT IMAGING | Facility: HOSPITAL | Age: 71
DRG: 177 | End: 2021-10-10
Payer: COMMERCIAL

## 2021-10-10 ENCOUNTER — HOSPITAL ENCOUNTER (INPATIENT)
Facility: HOSPITAL | Age: 71
LOS: 4 days | Discharge: HOME HEALTH | DRG: 177 | End: 2021-10-14
Attending: EMERGENCY MEDICINE | Admitting: FAMILY MEDICINE
Payer: COMMERCIAL

## 2021-10-10 ENCOUNTER — APPOINTMENT (EMERGENCY)
Dept: CT IMAGING | Facility: HOSPITAL | Age: 71
DRG: 177 | End: 2021-10-10
Payer: COMMERCIAL

## 2021-10-10 ENCOUNTER — APPOINTMENT (EMERGENCY)
Dept: RADIOLOGY | Facility: HOSPITAL | Age: 71
DRG: 177 | End: 2021-10-10
Payer: COMMERCIAL

## 2021-10-10 DIAGNOSIS — F33.2 SEVERE EPISODE OF RECURRENT MAJOR DEPRESSIVE DISORDER, WITHOUT PSYCHOTIC FEATURES (HCC): ICD-10-CM

## 2021-10-10 DIAGNOSIS — J12.82 PNEUMONIA DUE TO COVID-19 VIRUS: Primary | ICD-10-CM

## 2021-10-10 DIAGNOSIS — U07.1 PNEUMONIA DUE TO COVID-19 VIRUS: Primary | ICD-10-CM

## 2021-10-10 DIAGNOSIS — F32.2 SEVERE MAJOR DEPRESSION, SINGLE EPISODE (HCC): ICD-10-CM

## 2021-10-10 DIAGNOSIS — R09.02 HYPOXIA: ICD-10-CM

## 2021-10-10 DIAGNOSIS — R42 VERTIGO: ICD-10-CM

## 2021-10-10 PROBLEM — J96.01 ACUTE RESPIRATORY FAILURE WITH HYPOXIA (HCC): Status: ACTIVE | Noted: 2021-10-10

## 2021-10-10 LAB
ALBUMIN SERPL BCP-MCNC: 3.2 G/DL (ref 3.5–5)
ALP SERPL-CCNC: 68 U/L (ref 46–116)
ALT SERPL W P-5'-P-CCNC: 24 U/L (ref 12–78)
ANION GAP SERPL CALCULATED.3IONS-SCNC: 10 MMOL/L (ref 4–13)
APTT PPP: 29 SECONDS (ref 23–37)
AST SERPL W P-5'-P-CCNC: 37 U/L (ref 5–45)
ATRIAL RATE: 84 BPM
BASOPHILS # BLD MANUAL: 0 THOUSAND/UL (ref 0–0.1)
BASOPHILS NFR MAR MANUAL: 0 % (ref 0–1)
BILIRUB DIRECT SERPL-MCNC: 0.29 MG/DL (ref 0–0.2)
BILIRUB SERPL-MCNC: 0.8 MG/DL (ref 0.2–1)
BUN SERPL-MCNC: 17 MG/DL (ref 5–25)
CALCIUM SERPL-MCNC: 8.7 MG/DL (ref 8.3–10.1)
CHLORIDE SERPL-SCNC: 97 MMOL/L (ref 100–108)
CO2 SERPL-SCNC: 30 MMOL/L (ref 21–32)
CREAT SERPL-MCNC: 1.09 MG/DL (ref 0.6–1.3)
D DIMER PPP FEU-MCNC: 1.46 UG/ML FEU
EOSINOPHIL # BLD MANUAL: 0 THOUSAND/UL (ref 0–0.4)
EOSINOPHIL NFR BLD MANUAL: 0 % (ref 0–6)
ERYTHROCYTE [DISTWIDTH] IN BLOOD BY AUTOMATED COUNT: 12.9 % (ref 11.6–15.1)
GFR SERPL CREATININE-BSD FRML MDRD: 69 ML/MIN/1.73SQ M
GLUCOSE SERPL-MCNC: 111 MG/DL (ref 65–140)
HCT VFR BLD AUTO: 45.7 % (ref 36.5–49.3)
HGB BLD-MCNC: 15.7 G/DL (ref 12–17)
INR PPP: 0.97 (ref 0.84–1.19)
LACTATE SERPL-SCNC: 2 MMOL/L (ref 0.5–2)
LYMPHOCYTES # BLD AUTO: 1.56 THOUSAND/UL (ref 0.6–4.47)
LYMPHOCYTES # BLD AUTO: 22 % (ref 14–44)
MAGNESIUM SERPL-MCNC: 2.2 MG/DL (ref 1.6–2.6)
MCH RBC QN AUTO: 32.4 PG (ref 26.8–34.3)
MCHC RBC AUTO-ENTMCNC: 34.4 G/DL (ref 31.4–37.4)
MCV RBC AUTO: 94 FL (ref 82–98)
MONOCYTES # BLD AUTO: 0.28 THOUSAND/UL (ref 0–1.22)
MONOCYTES NFR BLD: 4 % (ref 4–12)
NEUTROPHILS # BLD MANUAL: 5.25 THOUSAND/UL (ref 1.85–7.62)
NEUTS BAND NFR BLD MANUAL: 1 % (ref 0–8)
NEUTS SEG NFR BLD AUTO: 73 % (ref 43–75)
NT-PROBNP SERPL-MCNC: 48 PG/ML
P AXIS: 72 DEGREES
PLATELET # BLD AUTO: 121 THOUSANDS/UL (ref 149–390)
PLATELET BLD QL SMEAR: ABNORMAL
PMV BLD AUTO: 9.4 FL (ref 8.9–12.7)
POTASSIUM SERPL-SCNC: 4.1 MMOL/L (ref 3.5–5.3)
PR INTERVAL: 154 MS
PROT SERPL-MCNC: 7.5 G/DL (ref 6.4–8.2)
PROTHROMBIN TIME: 12.5 SECONDS (ref 11.6–14.5)
QRS AXIS: 16 DEGREES
QRSD INTERVAL: 96 MS
QT INTERVAL: 300 MS
QTC INTERVAL: 354 MS
RBC # BLD AUTO: 4.85 MILLION/UL (ref 3.88–5.62)
RBC MORPH BLD: NORMAL
SODIUM SERPL-SCNC: 137 MMOL/L (ref 136–145)
T WAVE AXIS: 44 DEGREES
TROPONIN I SERPL-MCNC: <0.02 NG/ML
VENTRICULAR RATE: 84 BPM
WBC # BLD AUTO: 7.1 THOUSAND/UL (ref 4.31–10.16)

## 2021-10-10 PROCEDURE — 71045 X-RAY EXAM CHEST 1 VIEW: CPT

## 2021-10-10 PROCEDURE — XW033E5 INTRODUCTION OF REMDESIVIR ANTI-INFECTIVE INTO PERIPHERAL VEIN, PERCUTANEOUS APPROACH, NEW TECHNOLOGY GROUP 5: ICD-10-PCS | Performed by: INTERNAL MEDICINE

## 2021-10-10 PROCEDURE — 96374 THER/PROPH/DIAG INJ IV PUSH: CPT

## 2021-10-10 PROCEDURE — 85007 BL SMEAR W/DIFF WBC COUNT: CPT | Performed by: EMERGENCY MEDICINE

## 2021-10-10 PROCEDURE — 96361 HYDRATE IV INFUSION ADD-ON: CPT

## 2021-10-10 PROCEDURE — 99223 1ST HOSP IP/OBS HIGH 75: CPT | Performed by: FAMILY MEDICINE

## 2021-10-10 PROCEDURE — 36415 COLL VENOUS BLD VENIPUNCTURE: CPT | Performed by: EMERGENCY MEDICINE

## 2021-10-10 PROCEDURE — 85379 FIBRIN DEGRADATION QUANT: CPT | Performed by: EMERGENCY MEDICINE

## 2021-10-10 PROCEDURE — 71275 CT ANGIOGRAPHY CHEST: CPT

## 2021-10-10 PROCEDURE — 70450 CT HEAD/BRAIN W/O DYE: CPT

## 2021-10-10 PROCEDURE — 83735 ASSAY OF MAGNESIUM: CPT | Performed by: EMERGENCY MEDICINE

## 2021-10-10 PROCEDURE — 93005 ELECTROCARDIOGRAM TRACING: CPT

## 2021-10-10 PROCEDURE — 85027 COMPLETE CBC AUTOMATED: CPT | Performed by: EMERGENCY MEDICINE

## 2021-10-10 PROCEDURE — 85610 PROTHROMBIN TIME: CPT | Performed by: EMERGENCY MEDICINE

## 2021-10-10 PROCEDURE — 99285 EMERGENCY DEPT VISIT HI MDM: CPT

## 2021-10-10 PROCEDURE — 83605 ASSAY OF LACTIC ACID: CPT | Performed by: EMERGENCY MEDICINE

## 2021-10-10 PROCEDURE — 80048 BASIC METABOLIC PNL TOTAL CA: CPT | Performed by: EMERGENCY MEDICINE

## 2021-10-10 PROCEDURE — 83880 ASSAY OF NATRIURETIC PEPTIDE: CPT | Performed by: EMERGENCY MEDICINE

## 2021-10-10 PROCEDURE — 84145 PROCALCITONIN (PCT): CPT | Performed by: EMERGENCY MEDICINE

## 2021-10-10 PROCEDURE — 80076 HEPATIC FUNCTION PANEL: CPT | Performed by: EMERGENCY MEDICINE

## 2021-10-10 PROCEDURE — 99285 EMERGENCY DEPT VISIT HI MDM: CPT | Performed by: EMERGENCY MEDICINE

## 2021-10-10 PROCEDURE — 85730 THROMBOPLASTIN TIME PARTIAL: CPT | Performed by: EMERGENCY MEDICINE

## 2021-10-10 PROCEDURE — 93010 ELECTROCARDIOGRAM REPORT: CPT | Performed by: INTERNAL MEDICINE

## 2021-10-10 PROCEDURE — 96375 TX/PRO/DX INJ NEW DRUG ADDON: CPT

## 2021-10-10 PROCEDURE — 94640 AIRWAY INHALATION TREATMENT: CPT

## 2021-10-10 PROCEDURE — 84484 ASSAY OF TROPONIN QUANT: CPT | Performed by: EMERGENCY MEDICINE

## 2021-10-10 PROCEDURE — 87040 BLOOD CULTURE FOR BACTERIA: CPT | Performed by: EMERGENCY MEDICINE

## 2021-10-10 PROCEDURE — G1004 CDSM NDSC: HCPCS

## 2021-10-10 RX ORDER — ALBUTEROL SULFATE 90 UG/1
2 AEROSOL, METERED RESPIRATORY (INHALATION) ONCE
Status: COMPLETED | OUTPATIENT
Start: 2021-10-10 | End: 2021-10-10

## 2021-10-10 RX ORDER — CLOPIDOGREL BISULFATE 75 MG/1
75 TABLET ORAL DAILY
Status: DISCONTINUED | OUTPATIENT
Start: 2021-10-10 | End: 2021-10-14 | Stop reason: HOSPADM

## 2021-10-10 RX ORDER — DEXAMETHASONE SODIUM PHOSPHATE 10 MG/ML
10 INJECTION, SOLUTION INTRAMUSCULAR; INTRAVENOUS ONCE
Status: COMPLETED | OUTPATIENT
Start: 2021-10-10 | End: 2021-10-10

## 2021-10-10 RX ORDER — TRAZODONE HYDROCHLORIDE 50 MG/1
50 TABLET ORAL
Status: DISCONTINUED | OUTPATIENT
Start: 2021-10-10 | End: 2021-10-14 | Stop reason: HOSPADM

## 2021-10-10 RX ORDER — ATORVASTATIN CALCIUM 40 MG/1
40 TABLET, FILM COATED ORAL EVERY EVENING
Status: DISCONTINUED | OUTPATIENT
Start: 2021-10-10 | End: 2021-10-14 | Stop reason: HOSPADM

## 2021-10-10 RX ORDER — ONDANSETRON 2 MG/ML
4 INJECTION INTRAMUSCULAR; INTRAVENOUS ONCE
Status: COMPLETED | OUTPATIENT
Start: 2021-10-10 | End: 2021-10-10

## 2021-10-10 RX ORDER — BUSPIRONE HYDROCHLORIDE 5 MG/1
10 TABLET ORAL 2 TIMES DAILY
Status: DISCONTINUED | OUTPATIENT
Start: 2021-10-10 | End: 2021-10-14

## 2021-10-10 RX ORDER — MECLIZINE HYDROCHLORIDE 25 MG/1
25 TABLET ORAL ONCE
Status: COMPLETED | OUTPATIENT
Start: 2021-10-10 | End: 2021-10-10

## 2021-10-10 RX ORDER — DEXAMETHASONE SODIUM PHOSPHATE 4 MG/ML
6 INJECTION, SOLUTION INTRA-ARTICULAR; INTRALESIONAL; INTRAMUSCULAR; INTRAVENOUS; SOFT TISSUE EVERY 24 HOURS
Status: DISCONTINUED | OUTPATIENT
Start: 2021-10-11 | End: 2021-10-14

## 2021-10-10 RX ORDER — PANTOPRAZOLE SODIUM 40 MG/1
40 TABLET, DELAYED RELEASE ORAL
Status: DISCONTINUED | OUTPATIENT
Start: 2021-10-10 | End: 2021-10-14 | Stop reason: HOSPADM

## 2021-10-10 RX ORDER — SUCRALFATE 1 G/1
1 TABLET ORAL 4 TIMES DAILY
Status: DISCONTINUED | OUTPATIENT
Start: 2021-10-10 | End: 2021-10-14 | Stop reason: HOSPADM

## 2021-10-10 RX ORDER — ALBUTEROL SULFATE 2.5 MG/3ML
2.5 SOLUTION RESPIRATORY (INHALATION) EVERY 6 HOURS PRN
Status: DISCONTINUED | OUTPATIENT
Start: 2021-10-10 | End: 2021-10-11

## 2021-10-10 RX ORDER — BUDESONIDE 0.5 MG/2ML
0.5 INHALANT ORAL 2 TIMES DAILY
Status: DISCONTINUED | OUTPATIENT
Start: 2021-10-10 | End: 2021-10-10

## 2021-10-10 RX ORDER — IBUPROFEN 400 MG/1
400 TABLET ORAL ONCE
Status: COMPLETED | OUTPATIENT
Start: 2021-10-10 | End: 2021-10-10

## 2021-10-10 RX ORDER — GABAPENTIN 300 MG/1
300 CAPSULE ORAL 3 TIMES DAILY
Status: DISCONTINUED | OUTPATIENT
Start: 2021-10-10 | End: 2021-10-14 | Stop reason: HOSPADM

## 2021-10-10 RX ORDER — BUDESONIDE 0.5 MG/2ML
0.5 INHALANT ORAL
Status: DISCONTINUED | OUTPATIENT
Start: 2021-10-10 | End: 2021-10-11

## 2021-10-10 RX ADMIN — CLOPIDOGREL BISULFATE 75 MG: 75 TABLET ORAL at 18:24

## 2021-10-10 RX ADMIN — ENOXAPARIN SODIUM 90 MG: 100 INJECTION SUBCUTANEOUS at 21:39

## 2021-10-10 RX ADMIN — ALBUTEROL SULFATE 2 PUFF: 90 AEROSOL, METERED RESPIRATORY (INHALATION) at 12:52

## 2021-10-10 RX ADMIN — ONDANSETRON 4 MG: 2 INJECTION INTRAMUSCULAR; INTRAVENOUS at 12:52

## 2021-10-10 RX ADMIN — REMDESIVIR 200 MG: 100 INJECTION, POWDER, LYOPHILIZED, FOR SOLUTION INTRAVENOUS at 18:25

## 2021-10-10 RX ADMIN — MECLIZINE HYDROCHLORIDE 25 MG: 25 TABLET ORAL at 12:44

## 2021-10-10 RX ADMIN — DEXAMETHASONE SODIUM PHOSPHATE 10 MG: 10 INJECTION, SOLUTION INTRAMUSCULAR; INTRAVENOUS at 12:52

## 2021-10-10 RX ADMIN — GABAPENTIN 300 MG: 300 CAPSULE ORAL at 21:39

## 2021-10-10 RX ADMIN — IBUPROFEN 400 MG: 400 TABLET ORAL at 18:27

## 2021-10-10 RX ADMIN — TRAZODONE HYDROCHLORIDE 50 MG: 50 TABLET ORAL at 21:39

## 2021-10-10 RX ADMIN — SODIUM CHLORIDE 1000 ML: 0.9 INJECTION, SOLUTION INTRAVENOUS at 12:51

## 2021-10-10 RX ADMIN — PANTOPRAZOLE SODIUM 40 MG: 40 TABLET, DELAYED RELEASE ORAL at 18:24

## 2021-10-10 RX ADMIN — SUCRALFATE 1 G: 1 TABLET ORAL at 21:39

## 2021-10-10 RX ADMIN — BUSPIRONE HYDROCHLORIDE 10 MG: 5 TABLET ORAL at 18:24

## 2021-10-10 RX ADMIN — IOHEXOL 85 ML: 350 INJECTION, SOLUTION INTRAVENOUS at 13:49

## 2021-10-10 RX ADMIN — BUDESONIDE 0.5 MG: 0.5 INHALANT ORAL at 20:39

## 2021-10-10 RX ADMIN — ATORVASTATIN CALCIUM 40 MG: 40 TABLET, FILM COATED ORAL at 18:24

## 2021-10-10 RX ADMIN — SUCRALFATE 1 G: 1 TABLET ORAL at 18:25

## 2021-10-10 RX ADMIN — GABAPENTIN 300 MG: 300 CAPSULE ORAL at 18:25

## 2021-10-11 PROBLEM — F11.20 UNCOMPLICATED OPIOID DEPENDENCE (HCC): Status: ACTIVE | Noted: 2021-10-11

## 2021-10-11 LAB
ALBUMIN SERPL BCP-MCNC: 2.6 G/DL (ref 3.5–5)
ALP SERPL-CCNC: 57 U/L (ref 46–116)
ALT SERPL W P-5'-P-CCNC: 25 U/L (ref 12–78)
ANION GAP SERPL CALCULATED.3IONS-SCNC: 7 MMOL/L (ref 4–13)
AST SERPL W P-5'-P-CCNC: 31 U/L (ref 5–45)
BILIRUB SERPL-MCNC: 0.45 MG/DL (ref 0.2–1)
BUN SERPL-MCNC: 19 MG/DL (ref 5–25)
CALCIUM ALBUM COR SERPL-MCNC: 9.6 MG/DL (ref 8.3–10.1)
CALCIUM SERPL-MCNC: 8.5 MG/DL (ref 8.3–10.1)
CHLORIDE SERPL-SCNC: 104 MMOL/L (ref 100–108)
CO2 SERPL-SCNC: 29 MMOL/L (ref 21–32)
CREAT SERPL-MCNC: 0.99 MG/DL (ref 0.6–1.3)
CRP SERPL QL: 42.8 MG/L
DME PARACHUTE DELIVERY DATE ACTUAL: NORMAL
DME PARACHUTE DELIVERY DATE REQUESTED: NORMAL
DME PARACHUTE ITEM DESCRIPTION: NORMAL
DME PARACHUTE ORDER STATUS: NORMAL
DME PARACHUTE SUPPLIER NAME: NORMAL
DME PARACHUTE SUPPLIER PHONE: NORMAL
ERYTHROCYTE [DISTWIDTH] IN BLOOD BY AUTOMATED COUNT: 12.9 % (ref 11.6–15.1)
GFR SERPL CREATININE-BSD FRML MDRD: 77 ML/MIN/1.73SQ M
GLUCOSE SERPL-MCNC: 125 MG/DL (ref 65–140)
HCT VFR BLD AUTO: 41.7 % (ref 36.5–49.3)
HGB BLD-MCNC: 14 G/DL (ref 12–17)
MCH RBC QN AUTO: 32.1 PG (ref 26.8–34.3)
MCHC RBC AUTO-ENTMCNC: 33.6 G/DL (ref 31.4–37.4)
MCV RBC AUTO: 96 FL (ref 82–98)
NRBC BLD AUTO-RTO: 0 /100 WBCS
PLATELET # BLD AUTO: 129 THOUSANDS/UL (ref 149–390)
PMV BLD AUTO: 9.4 FL (ref 8.9–12.7)
POTASSIUM SERPL-SCNC: 4.4 MMOL/L (ref 3.5–5.3)
PROCALCITONIN SERPL-MCNC: <0.05 NG/ML
PROCALCITONIN SERPL-MCNC: <0.05 NG/ML
PROT SERPL-MCNC: 6.5 G/DL (ref 6.4–8.2)
RBC # BLD AUTO: 4.36 MILLION/UL (ref 3.88–5.62)
SODIUM SERPL-SCNC: 140 MMOL/L (ref 136–145)
WBC # BLD AUTO: 3.67 THOUSAND/UL (ref 4.31–10.16)

## 2021-10-11 PROCEDURE — 84145 PROCALCITONIN (PCT): CPT | Performed by: EMERGENCY MEDICINE

## 2021-10-11 PROCEDURE — 85027 COMPLETE CBC AUTOMATED: CPT | Performed by: FAMILY MEDICINE

## 2021-10-11 PROCEDURE — 97162 PT EVAL MOD COMPLEX 30 MIN: CPT

## 2021-10-11 PROCEDURE — 86140 C-REACTIVE PROTEIN: CPT | Performed by: FAMILY MEDICINE

## 2021-10-11 PROCEDURE — G0008 ADMIN INFLUENZA VIRUS VAC: HCPCS | Performed by: FAMILY MEDICINE

## 2021-10-11 PROCEDURE — 90662 IIV NO PRSV INCREASED AG IM: CPT | Performed by: FAMILY MEDICINE

## 2021-10-11 PROCEDURE — 94640 AIRWAY INHALATION TREATMENT: CPT

## 2021-10-11 PROCEDURE — 80053 COMPREHEN METABOLIC PANEL: CPT | Performed by: FAMILY MEDICINE

## 2021-10-11 PROCEDURE — 99232 SBSQ HOSP IP/OBS MODERATE 35: CPT | Performed by: NURSE PRACTITIONER

## 2021-10-11 RX ORDER — FLUTICASONE PROPIONATE 110 UG/1
1 AEROSOL, METERED RESPIRATORY (INHALATION) EVERY 12 HOURS SCHEDULED
Status: DISCONTINUED | OUTPATIENT
Start: 2021-10-11 | End: 2021-10-14 | Stop reason: HOSPADM

## 2021-10-11 RX ORDER — ALBUTEROL SULFATE 90 UG/1
2 AEROSOL, METERED RESPIRATORY (INHALATION) EVERY 4 HOURS PRN
Status: DISCONTINUED | OUTPATIENT
Start: 2021-10-11 | End: 2021-10-14 | Stop reason: HOSPADM

## 2021-10-11 RX ADMIN — ENOXAPARIN SODIUM 90 MG: 100 INJECTION SUBCUTANEOUS at 09:24

## 2021-10-11 RX ADMIN — INFLUENZA A VIRUS A/VICTORIA/2570/2019 IVR-215 (H1N1) ANTIGEN (FORMALDEHYDE INACTIVATED), INFLUENZA A VIRUS A/TASMANIA/503/2020 IVR-221 (H3N2) ANTIGEN (FORMALDEHYDE INACTIVATED), INFLUENZA B VIRUS B/PHUKET/3073/2013 ANTIGEN (FORMALDEHYDE INACTIVATED), AND INFLUENZA B VIRUS B/WASHINGTON/02/2019 ANTIGEN (FORMALDEHYDE INACTIVATED) 0.7 ML: 60; 60; 60; 60 INJECTION, SUSPENSION INTRAMUSCULAR at 21:09

## 2021-10-11 RX ADMIN — SUCRALFATE 1 G: 1 TABLET ORAL at 17:51

## 2021-10-11 RX ADMIN — BUSPIRONE HYDROCHLORIDE 10 MG: 5 TABLET ORAL at 09:23

## 2021-10-11 RX ADMIN — GABAPENTIN 300 MG: 300 CAPSULE ORAL at 09:23

## 2021-10-11 RX ADMIN — PANTOPRAZOLE SODIUM 40 MG: 40 TABLET, DELAYED RELEASE ORAL at 09:23

## 2021-10-11 RX ADMIN — SUCRALFATE 1 G: 1 TABLET ORAL at 21:09

## 2021-10-11 RX ADMIN — ENOXAPARIN SODIUM 90 MG: 100 INJECTION SUBCUTANEOUS at 21:09

## 2021-10-11 RX ADMIN — FLUTICASONE PROPIONATE 1 PUFF: 110 AEROSOL, METERED RESPIRATORY (INHALATION) at 19:28

## 2021-10-11 RX ADMIN — TRAZODONE HYDROCHLORIDE 50 MG: 50 TABLET ORAL at 21:09

## 2021-10-11 RX ADMIN — BUSPIRONE HYDROCHLORIDE 10 MG: 5 TABLET ORAL at 17:51

## 2021-10-11 RX ADMIN — GABAPENTIN 300 MG: 300 CAPSULE ORAL at 17:51

## 2021-10-11 RX ADMIN — CLOPIDOGREL BISULFATE 75 MG: 75 TABLET ORAL at 09:23

## 2021-10-11 RX ADMIN — PANTOPRAZOLE SODIUM 40 MG: 40 TABLET, DELAYED RELEASE ORAL at 17:51

## 2021-10-11 RX ADMIN — METHADONE HYDROCHLORIDE 85 MG: 10 TABLET ORAL at 12:05

## 2021-10-11 RX ADMIN — DEXAMETHASONE SODIUM PHOSPHATE 6 MG: 4 INJECTION, SOLUTION INTRAMUSCULAR; INTRAVENOUS at 12:07

## 2021-10-11 RX ADMIN — ATORVASTATIN CALCIUM 40 MG: 40 TABLET, FILM COATED ORAL at 17:51

## 2021-10-11 RX ADMIN — BUDESONIDE 0.5 MG: 0.5 INHALANT ORAL at 08:10

## 2021-10-11 RX ADMIN — SUCRALFATE 1 G: 1 TABLET ORAL at 09:23

## 2021-10-11 RX ADMIN — SUCRALFATE 1 G: 1 TABLET ORAL at 12:06

## 2021-10-11 RX ADMIN — REMDESIVIR 100 MG: 100 INJECTION, POWDER, LYOPHILIZED, FOR SOLUTION INTRAVENOUS at 17:51

## 2021-10-11 RX ADMIN — GABAPENTIN 300 MG: 300 CAPSULE ORAL at 21:08

## 2021-10-12 LAB
ALBUMIN SERPL BCP-MCNC: 2.5 G/DL (ref 3.5–5)
ALP SERPL-CCNC: 49 U/L (ref 46–116)
ALT SERPL W P-5'-P-CCNC: 17 U/L (ref 12–78)
ANION GAP SERPL CALCULATED.3IONS-SCNC: 8 MMOL/L (ref 4–13)
AST SERPL W P-5'-P-CCNC: 23 U/L (ref 5–45)
BASOPHILS # BLD AUTO: 0.01 THOUSANDS/ΜL (ref 0–0.1)
BASOPHILS NFR BLD AUTO: 0 % (ref 0–1)
BILIRUB SERPL-MCNC: 0.36 MG/DL (ref 0.2–1)
BUN SERPL-MCNC: 20 MG/DL (ref 5–25)
CALCIUM ALBUM COR SERPL-MCNC: 9.6 MG/DL (ref 8.3–10.1)
CALCIUM SERPL-MCNC: 8.4 MG/DL (ref 8.3–10.1)
CHLORIDE SERPL-SCNC: 106 MMOL/L (ref 100–108)
CO2 SERPL-SCNC: 26 MMOL/L (ref 21–32)
CREAT SERPL-MCNC: 0.83 MG/DL (ref 0.6–1.3)
EOSINOPHIL # BLD AUTO: 0 THOUSAND/ΜL (ref 0–0.61)
EOSINOPHIL NFR BLD AUTO: 0 % (ref 0–6)
ERYTHROCYTE [DISTWIDTH] IN BLOOD BY AUTOMATED COUNT: 12.8 % (ref 11.6–15.1)
GFR SERPL CREATININE-BSD FRML MDRD: 90 ML/MIN/1.73SQ M
GLUCOSE SERPL-MCNC: 115 MG/DL (ref 65–140)
HCT VFR BLD AUTO: 39.6 % (ref 36.5–49.3)
HGB BLD-MCNC: 13.3 G/DL (ref 12–17)
IMM GRANULOCYTES # BLD AUTO: 0.06 THOUSAND/UL (ref 0–0.2)
IMM GRANULOCYTES NFR BLD AUTO: 1 % (ref 0–2)
LYMPHOCYTES # BLD AUTO: 1.27 THOUSANDS/ΜL (ref 0.6–4.47)
LYMPHOCYTES NFR BLD AUTO: 13 % (ref 14–44)
MCH RBC QN AUTO: 32.3 PG (ref 26.8–34.3)
MCHC RBC AUTO-ENTMCNC: 33.6 G/DL (ref 31.4–37.4)
MCV RBC AUTO: 96 FL (ref 82–98)
MONOCYTES # BLD AUTO: 0.76 THOUSAND/ΜL (ref 0.17–1.22)
MONOCYTES NFR BLD AUTO: 8 % (ref 4–12)
NEUTROPHILS # BLD AUTO: 7.99 THOUSANDS/ΜL (ref 1.85–7.62)
NEUTS SEG NFR BLD AUTO: 78 % (ref 43–75)
NRBC BLD AUTO-RTO: 0 /100 WBCS
PLATELET # BLD AUTO: 142 THOUSANDS/UL (ref 149–390)
PMV BLD AUTO: 9.2 FL (ref 8.9–12.7)
POTASSIUM SERPL-SCNC: 4.1 MMOL/L (ref 3.5–5.3)
PROT SERPL-MCNC: 6 G/DL (ref 6.4–8.2)
RBC # BLD AUTO: 4.12 MILLION/UL (ref 3.88–5.62)
SODIUM SERPL-SCNC: 140 MMOL/L (ref 136–145)
WBC # BLD AUTO: 10.09 THOUSAND/UL (ref 4.31–10.16)

## 2021-10-12 PROCEDURE — 97166 OT EVAL MOD COMPLEX 45 MIN: CPT

## 2021-10-12 PROCEDURE — 99232 SBSQ HOSP IP/OBS MODERATE 35: CPT | Performed by: PHYSICIAN ASSISTANT

## 2021-10-12 PROCEDURE — 85025 COMPLETE CBC W/AUTO DIFF WBC: CPT | Performed by: NURSE PRACTITIONER

## 2021-10-12 PROCEDURE — 80053 COMPREHEN METABOLIC PANEL: CPT | Performed by: NURSE PRACTITIONER

## 2021-10-12 RX ORDER — MECLIZINE HYDROCHLORIDE 25 MG/1
25 TABLET ORAL EVERY 8 HOURS PRN
Status: DISCONTINUED | OUTPATIENT
Start: 2021-10-12 | End: 2021-10-14 | Stop reason: HOSPADM

## 2021-10-12 RX ADMIN — GABAPENTIN 300 MG: 300 CAPSULE ORAL at 08:57

## 2021-10-12 RX ADMIN — GABAPENTIN 300 MG: 300 CAPSULE ORAL at 21:18

## 2021-10-12 RX ADMIN — SUCRALFATE 1 G: 1 TABLET ORAL at 13:31

## 2021-10-12 RX ADMIN — SUCRALFATE 1 G: 1 TABLET ORAL at 18:32

## 2021-10-12 RX ADMIN — ENOXAPARIN SODIUM 90 MG: 100 INJECTION SUBCUTANEOUS at 08:56

## 2021-10-12 RX ADMIN — GABAPENTIN 300 MG: 300 CAPSULE ORAL at 18:32

## 2021-10-12 RX ADMIN — SUCRALFATE 1 G: 1 TABLET ORAL at 21:18

## 2021-10-12 RX ADMIN — REMDESIVIR 100 MG: 100 INJECTION, POWDER, LYOPHILIZED, FOR SOLUTION INTRAVENOUS at 18:26

## 2021-10-12 RX ADMIN — PANTOPRAZOLE SODIUM 40 MG: 40 TABLET, DELAYED RELEASE ORAL at 18:35

## 2021-10-12 RX ADMIN — CLOPIDOGREL BISULFATE 75 MG: 75 TABLET ORAL at 08:56

## 2021-10-12 RX ADMIN — MECLIZINE HYDROCHLORIDE 25 MG: 25 TABLET ORAL at 11:44

## 2021-10-12 RX ADMIN — TRAZODONE HYDROCHLORIDE 50 MG: 50 TABLET ORAL at 21:18

## 2021-10-12 RX ADMIN — SUCRALFATE 1 G: 1 TABLET ORAL at 08:55

## 2021-10-12 RX ADMIN — METHADONE HYDROCHLORIDE 85 MG: 10 TABLET ORAL at 08:55

## 2021-10-12 RX ADMIN — PANTOPRAZOLE SODIUM 40 MG: 40 TABLET, DELAYED RELEASE ORAL at 05:18

## 2021-10-12 RX ADMIN — BUSPIRONE HYDROCHLORIDE 10 MG: 5 TABLET ORAL at 08:57

## 2021-10-12 RX ADMIN — FLUTICASONE PROPIONATE 1 PUFF: 110 AEROSOL, METERED RESPIRATORY (INHALATION) at 08:57

## 2021-10-12 RX ADMIN — BUSPIRONE HYDROCHLORIDE 10 MG: 5 TABLET ORAL at 18:32

## 2021-10-12 RX ADMIN — DEXAMETHASONE SODIUM PHOSPHATE 6 MG: 4 INJECTION, SOLUTION INTRAMUSCULAR; INTRAVENOUS at 13:31

## 2021-10-12 RX ADMIN — FLUTICASONE PROPIONATE 1 PUFF: 110 AEROSOL, METERED RESPIRATORY (INHALATION) at 21:18

## 2021-10-12 RX ADMIN — ATORVASTATIN CALCIUM 40 MG: 40 TABLET, FILM COATED ORAL at 18:32

## 2021-10-12 RX ADMIN — ENOXAPARIN SODIUM 90 MG: 100 INJECTION SUBCUTANEOUS at 21:17

## 2021-10-13 PROCEDURE — 97116 GAIT TRAINING THERAPY: CPT

## 2021-10-13 PROCEDURE — 99232 SBSQ HOSP IP/OBS MODERATE 35: CPT | Performed by: PHYSICIAN ASSISTANT

## 2021-10-13 PROCEDURE — 99221 1ST HOSP IP/OBS SF/LOW 40: CPT | Performed by: PSYCHIATRY & NEUROLOGY

## 2021-10-13 PROCEDURE — 97530 THERAPEUTIC ACTIVITIES: CPT

## 2021-10-13 RX ADMIN — ATORVASTATIN CALCIUM 40 MG: 40 TABLET, FILM COATED ORAL at 16:50

## 2021-10-13 RX ADMIN — GABAPENTIN 300 MG: 300 CAPSULE ORAL at 16:50

## 2021-10-13 RX ADMIN — CLOPIDOGREL BISULFATE 75 MG: 75 TABLET ORAL at 08:30

## 2021-10-13 RX ADMIN — DEXAMETHASONE SODIUM PHOSPHATE 6 MG: 4 INJECTION, SOLUTION INTRAMUSCULAR; INTRAVENOUS at 13:29

## 2021-10-13 RX ADMIN — BUSPIRONE HYDROCHLORIDE 10 MG: 5 TABLET ORAL at 08:30

## 2021-10-13 RX ADMIN — PANTOPRAZOLE SODIUM 40 MG: 40 TABLET, DELAYED RELEASE ORAL at 16:50

## 2021-10-13 RX ADMIN — FLUTICASONE PROPIONATE 1 PUFF: 110 AEROSOL, METERED RESPIRATORY (INHALATION) at 21:52

## 2021-10-13 RX ADMIN — FLUTICASONE PROPIONATE 1 PUFF: 110 AEROSOL, METERED RESPIRATORY (INHALATION) at 08:32

## 2021-10-13 RX ADMIN — BUSPIRONE HYDROCHLORIDE 10 MG: 5 TABLET ORAL at 16:50

## 2021-10-13 RX ADMIN — TRAZODONE HYDROCHLORIDE 50 MG: 50 TABLET ORAL at 21:52

## 2021-10-13 RX ADMIN — REMDESIVIR 100 MG: 100 INJECTION, POWDER, LYOPHILIZED, FOR SOLUTION INTRAVENOUS at 16:50

## 2021-10-13 RX ADMIN — ENOXAPARIN SODIUM 90 MG: 100 INJECTION SUBCUTANEOUS at 08:31

## 2021-10-13 RX ADMIN — PANTOPRAZOLE SODIUM 40 MG: 40 TABLET, DELAYED RELEASE ORAL at 06:01

## 2021-10-13 RX ADMIN — GABAPENTIN 300 MG: 300 CAPSULE ORAL at 08:30

## 2021-10-13 RX ADMIN — ENOXAPARIN SODIUM 90 MG: 100 INJECTION SUBCUTANEOUS at 21:52

## 2021-10-13 RX ADMIN — SUCRALFATE 1 G: 1 TABLET ORAL at 08:30

## 2021-10-13 RX ADMIN — SUCRALFATE 1 G: 1 TABLET ORAL at 21:52

## 2021-10-13 RX ADMIN — METHADONE HYDROCHLORIDE 85 MG: 10 TABLET ORAL at 08:30

## 2021-10-13 RX ADMIN — GABAPENTIN 300 MG: 300 CAPSULE ORAL at 21:52

## 2021-10-13 RX ADMIN — SUCRALFATE 1 G: 1 TABLET ORAL at 16:50

## 2021-10-13 RX ADMIN — SUCRALFATE 1 G: 1 TABLET ORAL at 13:30

## 2021-10-14 VITALS
DIASTOLIC BLOOD PRESSURE: 72 MMHG | OXYGEN SATURATION: 91 % | TEMPERATURE: 98.5 F | RESPIRATION RATE: 18 BRPM | SYSTOLIC BLOOD PRESSURE: 144 MMHG | HEART RATE: 58 BPM | BODY MASS INDEX: 27.33 KG/M2 | WEIGHT: 190.92 LBS | HEIGHT: 70 IN

## 2021-10-14 PROBLEM — J96.01 ACUTE RESPIRATORY FAILURE WITH HYPOXIA (HCC): Status: RESOLVED | Noted: 2021-10-10 | Resolved: 2021-10-14

## 2021-10-14 PROBLEM — F32.A ANXIETY AND DEPRESSION: Status: ACTIVE | Noted: 2020-10-13

## 2021-10-14 PROBLEM — F41.9 ANXIETY AND DEPRESSION: Status: ACTIVE | Noted: 2020-10-13

## 2021-10-14 PROCEDURE — 99239 HOSP IP/OBS DSCHRG MGMT >30: CPT | Performed by: PHYSICIAN ASSISTANT

## 2021-10-14 RX ORDER — MECLIZINE HYDROCHLORIDE 25 MG/1
25 TABLET ORAL EVERY 8 HOURS PRN
Qty: 30 TABLET | Refills: 0 | Status: SHIPPED | OUTPATIENT
Start: 2021-10-14

## 2021-10-14 RX ORDER — DEXAMETHASONE SODIUM PHOSPHATE 4 MG/ML
6 INJECTION, SOLUTION INTRA-ARTICULAR; INTRALESIONAL; INTRAMUSCULAR; INTRAVENOUS; SOFT TISSUE ONCE
Status: COMPLETED | OUTPATIENT
Start: 2021-10-14 | End: 2021-10-14

## 2021-10-14 RX ORDER — BUSPIRONE HYDROCHLORIDE 10 MG/1
10 TABLET ORAL 3 TIMES DAILY
Qty: 90 TABLET | Refills: 0 | Status: SHIPPED | OUTPATIENT
Start: 2021-10-14

## 2021-10-14 RX ORDER — BUSPIRONE HYDROCHLORIDE 5 MG/1
10 TABLET ORAL 3 TIMES DAILY
Status: DISCONTINUED | OUTPATIENT
Start: 2021-10-14 | End: 2021-10-14 | Stop reason: HOSPADM

## 2021-10-14 RX ORDER — METHADONE HYDROCHLORIDE 10 MG/5ML
84 SOLUTION ORAL DAILY
COMMUNITY

## 2021-10-14 RX ADMIN — GABAPENTIN 300 MG: 300 CAPSULE ORAL at 09:00

## 2021-10-14 RX ADMIN — GABAPENTIN 300 MG: 300 CAPSULE ORAL at 17:01

## 2021-10-14 RX ADMIN — DEXAMETHASONE SODIUM PHOSPHATE 6 MG: 4 INJECTION, SOLUTION INTRAMUSCULAR; INTRAVENOUS at 09:17

## 2021-10-14 RX ADMIN — SUCRALFATE 1 G: 1 TABLET ORAL at 17:01

## 2021-10-14 RX ADMIN — BUSPIRONE HYDROCHLORIDE 10 MG: 5 TABLET ORAL at 09:00

## 2021-10-14 RX ADMIN — REMDESIVIR 100 MG: 100 INJECTION, POWDER, LYOPHILIZED, FOR SOLUTION INTRAVENOUS at 09:17

## 2021-10-14 RX ADMIN — BUSPIRONE HYDROCHLORIDE 10 MG: 5 TABLET ORAL at 17:01

## 2021-10-14 RX ADMIN — ATORVASTATIN CALCIUM 40 MG: 40 TABLET, FILM COATED ORAL at 17:01

## 2021-10-14 RX ADMIN — SUCRALFATE 1 G: 1 TABLET ORAL at 09:00

## 2021-10-14 RX ADMIN — CLOPIDOGREL BISULFATE 75 MG: 75 TABLET ORAL at 09:00

## 2021-10-14 RX ADMIN — FLUTICASONE PROPIONATE 1 PUFF: 110 AEROSOL, METERED RESPIRATORY (INHALATION) at 09:17

## 2021-10-14 RX ADMIN — SUCRALFATE 1 G: 1 TABLET ORAL at 13:05

## 2021-10-14 RX ADMIN — PANTOPRAZOLE SODIUM 40 MG: 40 TABLET, DELAYED RELEASE ORAL at 06:38

## 2021-10-14 RX ADMIN — ENOXAPARIN SODIUM 90 MG: 100 INJECTION SUBCUTANEOUS at 09:02

## 2021-10-14 RX ADMIN — PANTOPRAZOLE SODIUM 40 MG: 40 TABLET, DELAYED RELEASE ORAL at 17:01

## 2021-10-14 RX ADMIN — METHADONE HYDROCHLORIDE 85 MG: 10 TABLET ORAL at 09:00

## 2021-10-16 LAB — BACTERIA BLD CULT: NORMAL

## 2021-10-19 LAB
BACTERIA BLD CULT: ABNORMAL
BACTERIA BLD CULT: ABNORMAL
GRAM STN SPEC: ABNORMAL

## 2022-06-01 ENCOUNTER — APPOINTMENT (EMERGENCY)
Dept: RADIOLOGY | Facility: HOSPITAL | Age: 72
End: 2022-06-01
Payer: COMMERCIAL

## 2022-06-01 ENCOUNTER — HOSPITAL ENCOUNTER (EMERGENCY)
Facility: HOSPITAL | Age: 72
Discharge: HOME | End: 2022-06-02
Attending: EMERGENCY MEDICINE
Payer: COMMERCIAL

## 2022-06-01 DIAGNOSIS — F41.9 ANXIETY: ICD-10-CM

## 2022-06-01 DIAGNOSIS — R11.2 NAUSEA AND VOMITING, UNSPECIFIED VOMITING TYPE: Primary | ICD-10-CM

## 2022-06-01 LAB
ALBUMIN SERPL BCP-MCNC: 3.4 G/DL (ref 3.5–5)
ALP SERPL-CCNC: 87 U/L (ref 46–116)
ALT SERPL W P-5'-P-CCNC: 30 U/L (ref 12–78)
ANION GAP SERPL CALCULATED.3IONS-SCNC: 10 MMOL/L (ref 4–13)
AST SERPL W P-5'-P-CCNC: 27 U/L (ref 5–45)
BASOPHILS # BLD AUTO: 0.05 THOUSANDS/ΜL (ref 0–0.1)
BASOPHILS NFR BLD AUTO: 0 % (ref 0–1)
BILIRUB SERPL-MCNC: 1.05 MG/DL (ref 0.2–1)
BUN SERPL-MCNC: 14 MG/DL (ref 5–25)
CALCIUM ALBUM COR SERPL-MCNC: 9.9 MG/DL (ref 8.3–10.1)
CALCIUM SERPL-MCNC: 9.4 MG/DL (ref 8.3–10.1)
CARDIAC TROPONIN I PNL SERPL HS: 3 NG/L
CHLORIDE SERPL-SCNC: 101 MMOL/L (ref 100–108)
CO2 SERPL-SCNC: 28 MMOL/L (ref 21–32)
CREAT SERPL-MCNC: 1.15 MG/DL (ref 0.6–1.3)
EOSINOPHIL # BLD AUTO: 0.07 THOUSAND/ΜL (ref 0–0.61)
EOSINOPHIL NFR BLD AUTO: 1 % (ref 0–6)
ERYTHROCYTE [DISTWIDTH] IN BLOOD BY AUTOMATED COUNT: 12.9 % (ref 11.6–15.1)
GFR SERPL CREATININE-BSD FRML MDRD: 64 ML/MIN/1.73SQ M
GLUCOSE SERPL-MCNC: 110 MG/DL (ref 65–140)
HCT VFR BLD AUTO: 42.5 % (ref 36.5–49.3)
HGB BLD-MCNC: 14.4 G/DL (ref 12–17)
IMM GRANULOCYTES # BLD AUTO: 0.06 THOUSAND/UL (ref 0–0.2)
IMM GRANULOCYTES NFR BLD AUTO: 1 % (ref 0–2)
LIPASE SERPL-CCNC: 92 U/L (ref 73–393)
LYMPHOCYTES # BLD AUTO: 1.39 THOUSANDS/ΜL (ref 0.6–4.47)
LYMPHOCYTES NFR BLD AUTO: 11 % (ref 14–44)
MAGNESIUM SERPL-MCNC: 2 MG/DL (ref 1.6–2.6)
MCH RBC QN AUTO: 32.3 PG (ref 26.8–34.3)
MCHC RBC AUTO-ENTMCNC: 33.9 G/DL (ref 31.4–37.4)
MCV RBC AUTO: 95 FL (ref 82–98)
MONOCYTES # BLD AUTO: 1.15 THOUSAND/ΜL (ref 0.17–1.22)
MONOCYTES NFR BLD AUTO: 9 % (ref 4–12)
NEUTROPHILS # BLD AUTO: 10 THOUSANDS/ΜL (ref 1.85–7.62)
NEUTS SEG NFR BLD AUTO: 78 % (ref 43–75)
NRBC BLD AUTO-RTO: 0 /100 WBCS
PLATELET # BLD AUTO: 338 THOUSANDS/UL (ref 149–390)
PMV BLD AUTO: 8.1 FL (ref 8.9–12.7)
POTASSIUM SERPL-SCNC: 4.5 MMOL/L (ref 3.5–5.3)
PROT SERPL-MCNC: 7.8 G/DL (ref 6.4–8.2)
RBC # BLD AUTO: 4.46 MILLION/UL (ref 3.88–5.62)
SODIUM SERPL-SCNC: 139 MMOL/L (ref 136–145)
WBC # BLD AUTO: 12.72 THOUSAND/UL (ref 4.31–10.16)

## 2022-06-01 PROCEDURE — 93005 ELECTROCARDIOGRAM TRACING: CPT

## 2022-06-01 PROCEDURE — 96374 THER/PROPH/DIAG INJ IV PUSH: CPT

## 2022-06-01 PROCEDURE — 83690 ASSAY OF LIPASE: CPT | Performed by: EMERGENCY MEDICINE

## 2022-06-01 PROCEDURE — 99285 EMERGENCY DEPT VISIT HI MDM: CPT

## 2022-06-01 PROCEDURE — 96361 HYDRATE IV INFUSION ADD-ON: CPT

## 2022-06-01 PROCEDURE — 80053 COMPREHEN METABOLIC PANEL: CPT | Performed by: EMERGENCY MEDICINE

## 2022-06-01 PROCEDURE — 84484 ASSAY OF TROPONIN QUANT: CPT | Performed by: EMERGENCY MEDICINE

## 2022-06-01 PROCEDURE — 99284 EMERGENCY DEPT VISIT MOD MDM: CPT | Performed by: EMERGENCY MEDICINE

## 2022-06-01 PROCEDURE — 83735 ASSAY OF MAGNESIUM: CPT | Performed by: EMERGENCY MEDICINE

## 2022-06-01 PROCEDURE — 85025 COMPLETE CBC W/AUTO DIFF WBC: CPT | Performed by: EMERGENCY MEDICINE

## 2022-06-01 PROCEDURE — 71045 X-RAY EXAM CHEST 1 VIEW: CPT

## 2022-06-01 PROCEDURE — 36415 COLL VENOUS BLD VENIPUNCTURE: CPT | Performed by: EMERGENCY MEDICINE

## 2022-06-01 RX ORDER — DROPERIDOL 2.5 MG/ML
0.62 INJECTION, SOLUTION INTRAMUSCULAR; INTRAVENOUS ONCE
Status: COMPLETED | OUTPATIENT
Start: 2022-06-01 | End: 2022-06-01

## 2022-06-01 RX ADMIN — DROPERIDOL 0.62 MG: 2.5 INJECTION, SOLUTION INTRAMUSCULAR; INTRAVENOUS at 22:58

## 2022-06-01 RX ADMIN — SODIUM CHLORIDE 1000 ML: 0.9 INJECTION, SOLUTION INTRAVENOUS at 22:58

## 2022-06-02 VITALS
RESPIRATION RATE: 12 BRPM | WEIGHT: 175 LBS | DIASTOLIC BLOOD PRESSURE: 68 MMHG | BODY MASS INDEX: 26.52 KG/M2 | HEART RATE: 85 BPM | TEMPERATURE: 98.2 F | SYSTOLIC BLOOD PRESSURE: 126 MMHG | OXYGEN SATURATION: 98 % | HEIGHT: 68 IN

## 2022-06-02 LAB
2HR DELTA HS TROPONIN: 0 NG/L
ATRIAL RATE: 67 BPM
CARDIAC TROPONIN I PNL SERPL HS: 3 NG/L
P AXIS: 70 DEGREES
PR INTERVAL: 154 MS
QRS AXIS: 81 DEGREES
QRSD INTERVAL: 98 MS
QT INTERVAL: 428 MS
QTC INTERVAL: 452 MS
T WAVE AXIS: 45 DEGREES
VENTRICULAR RATE: 67 BPM

## 2022-06-02 PROCEDURE — 93010 ELECTROCARDIOGRAM REPORT: CPT | Performed by: INTERNAL MEDICINE

## 2022-06-02 PROCEDURE — 84484 ASSAY OF TROPONIN QUANT: CPT | Performed by: EMERGENCY MEDICINE

## 2022-06-02 PROCEDURE — 36415 COLL VENOUS BLD VENIPUNCTURE: CPT | Performed by: EMERGENCY MEDICINE

## 2022-06-02 RX ORDER — ONDANSETRON 4 MG/1
4 TABLET, ORALLY DISINTEGRATING ORAL EVERY 6 HOURS PRN
Qty: 20 TABLET | Refills: 0 | Status: SHIPPED | OUTPATIENT
Start: 2022-06-02

## 2022-06-02 NOTE — ED PROVIDER NOTES
Pt Name: Leon Pryor  MRN: [id number]  [place] 1950  Age/Sex: 73 y o  male  Date of evaluation: 6/1/2022  PCP: Tracey Rey DO    CHIEF COMPLAINT    Chief Complaint   Patient presents with    Diarrhea    Vomiting     Patient presents with weakness, NVD, sob since earlier today         HPI and MDM    73 y o  male presenting with nausea and vomiting  Patient states for the last 2 days he has had some nausea and vomiting, and upset or diarrhea, not eating or drinking much  States he also has had some shortness of breath  States he is having anxiety attack, states the symptoms are normal for him while he is having an anxiety attack  Denies any chest pain, fevers or chills, no sick contacts  Denies any suicidal or homicidal ideations  States he is just afraid of being alone  Mentions that he takes Prozac and gabapentin  EKG shows normal sinus rhythm heart of 67, narrow QRS, normal axis, intervals reassuring, no ST elevation, no significant ST depression  Vitals reassuring, patient not in acute distress  Workup is overall reassuring  Patient without any vomiting while in the emergency department  Complains primarily of anxiety  No SI or HI  Provided prescription for Zofran  Advise PCP follow-up  ED Course as of 06/02/22 0655   Wed Jun 01, 2022   2348 XR chest 1 view portable  No acute processes on my interpretation  Medications   sodium chloride 0 9 % bolus 1,000 mL (0 mL Intravenous Stopped 6/2/22 0037)   droperidol (INAPSINE) injection 0 625 mg (0 625 mg Intravenous Given 6/1/22 2471)         Past Medical and Surgical History    Past Medical History:   Diagnosis Date    Addiction to drug ([address] )     Asthma     Bronchitis        Past Surgical History:   Procedure Laterality Date    ANKLE SURGERY      KNEE SURGERY      RIB FRACTURE SURGERY         History reviewed  No pertinent family history      Social History     Tobacco Use    Smoking status: Light Tobacco Smoker    Smokeless tobacco: Never Used   Vaping Use    Vaping Use: Former   Substance Use Topics    Alcohol use: Not Currently    Drug use: Yes     Types: Marijuana     Comment: Pt denies use of substances although ED UDS resulted +THC           Allergies    Allergies   Allergen Reactions    Aspirin     Tylenol [Acetaminophen] Other (See Comments)     Burning in thighs       Home Medications    Prior to Admission medications    Medication Sig Start Date End Date Taking? Authorizing Provider   albuterol (2 5 mg/3 mL) 0 083 % nebulizer solution Take 3 mL (2 5 mg total) by nebulization every 6 (six) hours as needed for wheezing or shortness of breath 10/7/21   Whit Woody MD   albuterol (PROVENTIL HFA,VENTOLIN HFA) 90 mcg/act inhaler Inhale 2 puffs every 4 (four) hours as needed for wheezing 8/31/20   Tina Mortimer, MD   atorvastatin (LIPITOR) 40 mg tablet Take 1 tablet (40 mg total) by mouth every evening 10/7/21   Ricardo RUIZ MD   budesonide (Pulmicort) 0 5 mg/2 mL nebulizer solution Take 2 mL (0 5 mg total) by nebulization 2 (two) times a day for 15 days Rinse mouth after use   10/7/21 10/22/21  Whit Woody MD   busPIRone (BUSPAR) 10 mg tablet Take 1 tablet (10 mg total) by mouth 3 (three) times a day 10/14/21   Jose Daniel Billingsley PA-C   cholecalciferol (VITAMIN D3) 1,000 units tablet Take 1 tablet (1,000 Units total) by mouth daily 9/11/20   Tina Mortimer, MD   clopidogrel (Plavix) 75 mg tablet Take 1 tablet (75 mg total) by mouth daily 10/7/21   Whit Woody MD   ergocalciferol (VITAMIN D2) 50,000 units Take 1 capsule (50,000 Units total) by mouth once a week for 6 doses 9/4/20 10/10/20  Tina Mortimer, MD   gabapentin (NEURONTIN) 300 mg capsule Take 300 mg by mouth 3 (three) times a day    Historical Provider, MD   hydrocortisone 1 % cream Apply topically 4 (four) times a day as needed for irritation or rash 8/31/20   Tina Mortimer, MD   meclizine (ANTIVERT) 25 mg tablet Take 1 tablet (25 mg total) by mouth every 8 (eight) hours as needed for dizziness 10/14/21   Princess Juan Luis PA-C   methadone (DOLOPHINE) 10 MG/5ML solution Take 84 mg by mouth daily    Historical Provider, MD   ondansetron (ZOFRAN-ODT) 4 mg disintegrating tablet Take 1 tablet (4 mg total) by mouth every 6 (six) hours as needed for nausea or vomiting 8/31/20   Qi Pradhan MD   pantoprazole (PROTONIX) 40 mg tablet Take 1 tablet (40 mg total) by mouth 2 (two) times a day before meals 8/31/20   Qi Pradhan MD   polyethylene glycol (MIRALAX) 17 g Take 1 packet (17 g total) by mouth daily as needed (constipation refractory to Senokot-S) 8/31/20   Qi Pradhna MD   senna-docusate sodium (SENOKOT S) 8 6-50 mg per tablet Take 1 tablet by mouth daily as needed for constipation 8/31/20   Qi Pradhan MD   sucralfate (CARAFATE) 1 g tablet Take 1 tablet (1 g total) by mouth 4 (four) times a day 8/31/20   Qi Pradhan MD   traZODone (DESYREL) 50 mg tablet TAKE 1 TABLET BY MOUTH DAILY AT BEDTIME 9/24/20   Elisa Muñoz MD           Review of Systems    Review of Systems   Constitutional: Positive for fatigue  Negative for chills and fever  HENT: Negative for rhinorrhea and sore throat  Eyes: Negative for pain and visual disturbance  Respiratory: Positive for shortness of breath  Negative for cough  Cardiovascular: Negative for chest pain and leg swelling  Gastrointestinal: Positive for diarrhea, nausea and vomiting  Negative for abdominal pain  Genitourinary: Negative for dysuria and hematuria  Musculoskeletal: Negative for back pain and myalgias  Skin: Negative for rash and wound  Neurological: Negative for syncope and headaches  Psychiatric/Behavioral: Negative for self-injury and suicidal ideas  The patient is nervous/anxious              Physical Exam      ED Triage Vitals   Temperature Pulse Respirations Blood Pressure SpO2   06/01/22 2223 06/01/22 2232 06/02/22 0030 06/01/22 2223 06/01/22 2232   98 2 °F (36 8 °C) 68 17 131/63 98 % Temp Source Heart Rate Source Patient Position - Orthostatic VS BP Location FiO2 (%)   06/01/22 2223 06/01/22 2232 06/01/22 2223 06/01/22 2223 --   Oral Monitor Sitting Right arm       Pain Score       --                      Physical Exam  Constitutional:       General: He is not in acute distress  Appearance: He is not ill-appearing  HENT:      Head: Normocephalic and atraumatic  Nose: Nose normal       Mouth/Throat:      Mouth: Mucous membranes are moist    Eyes:      Extraocular Movements: Extraocular movements intact  Pupils: Pupils are equal, round, and reactive to light  Cardiovascular:      Rate and Rhythm: Normal rate and regular rhythm  Pulmonary:      Effort: No respiratory distress  Breath sounds: Normal breath sounds  No wheezing  Abdominal:      General: There is no distension  Palpations: Abdomen is soft  Tenderness: There is no abdominal tenderness  Musculoskeletal:         General: No swelling or deformity  Normal range of motion  Cervical back: Normal range of motion and neck supple  Skin:     General: Skin is warm  Findings: No erythema  Neurological:      Mental Status: He is alert and oriented to person, place, and time  Mental status is at baseline     Psychiatric:      Comments: Somewhat anxious              Diagnostic Results      Labs:    Results Reviewed     Procedure Component Value Units Date/Time    HS Troponin I 2hr [[id number]]  (Normal) Collected: 06/02/22 0241    Lab Status: Final result Specimen: Blood from Arm, Left Updated: 06/02/22 0308     hs TnI 2hr 3 ng/L      Delta 2hr hsTnI 0 ng/L     Comprehensive metabolic panel [[id number]]  (Abnormal) Collected: 06/01/22 2259    Lab Status: Final result Specimen: Blood from Arm, Left Updated: 06/01/22 2331     Sodium 139 mmol/L      Potassium 4 5 mmol/L      Chloride 101 mmol/L      CO2 28 mmol/L      ANION GAP 10 mmol/L      BUN 14 mg/dL      Creatinine 1 15 mg/dL      Glucose 110 mg/dL      Calcium 9 4 mg/dL      Corrected Calcium 9 9 mg/dL      AST 27 U/L      ALT 30 U/L      Alkaline Phosphatase 87 U/L      Total Protein 7 8 g/dL      Albumin 3 4 g/dL      Total Bilirubin 1 05 mg/dL      eGFR 64 ml/min/1 73sq m     Narrative:      [place] guidelines for Chronic Kidney Disease (CKD):     Stage 1 with normal or high GFR (GFR > 90 mL/min/1 73 square meters)    Stage 2 Mild CKD (GFR = 60-89 mL/min/1 73 square meters)    Stage 3A Moderate CKD (GFR = 45-59 mL/min/1 73 square meters)    Stage 3B Moderate CKD (GFR = 30-44 mL/min/1 73 square meters)    Stage 4 Severe CKD (GFR = 15-29 mL/min/1 73 square meters)    Stage 5 End Stage CKD (GFR <15 mL/min/1 73 square meters)  Note: GFR calculation is accurate only with a steady state creatinine    Magnesium [[id number]]  (Normal) Collected: 06/01/22 2259    Lab Status: Final result Specimen: Blood from Arm, Left Updated: 06/01/22 2331     Magnesium 2 0 mg/dL     Lipase [[id number]]  (Normal) Collected: 06/01/22 2259    Lab Status: Final result Specimen: Blood from Arm, Left Updated: 06/01/22 2331     Lipase 92 u/L     HS Troponin 0hr (reflex protocol) [[id number]]  (Normal) Collected: 06/01/22 2259    Lab Status: Final result Specimen: Blood from Arm, Left Updated: 06/01/22 2329     hs TnI 0hr 3 ng/L     CBC and differential [[id number]]  (Abnormal) Collected: 06/01/22 2259    Lab Status: Final result Specimen: Blood from Arm, Left Updated: 06/01/22 2306     WBC 12 72 Thousand/uL      RBC 4 46 Million/uL      Hemoglobin 14 4 g/dL      Hematocrit 42 5 %      MCV 95 fL      MCH 32 3 pg      MCHC 33 9 g/dL      RDW 12 9 %      MPV 8 1 fL      Platelets 410 Thousands/uL      nRBC 0 /100 WBCs      Neutrophils Relative 78 %      Immat GRANS % 1 %      Lymphocytes Relative 11 %      Monocytes Relative 9 %      Eosinophils Relative 1 %      Basophils Relative 0 %      Neutrophils Absolute 10 00 Thousands/µL      Immature Grans Absolute 0 06 Thousand/uL      Lymphocytes Absolute 1 39 Thousands/µL      Monocytes Absolute 1 15 Thousand/µL      Eosinophils Absolute 0 07 Thousand/µL      Basophils Absolute 0 05 Thousands/µL           All labs reviewed and utilized in the medical decision making process    Radiology:    XR chest 1 view portable    (Results Pending)       All radiology studies independently viewed by me and interpreted by the radiologist     Procedure    Procedures        FINAL IMPRESSION    Final diagnoses:   Nausea and vomiting, unspecified vomiting type   Anxiety         DISPOSITION    Time reflects when diagnosis was documented in both MDM as applicable and the Disposition within this note     Time User Action Codes Description Comment    6/2/2022  3:45 AM [employer] Add [R11 2] Nausea and vomiting, unspecified vomiting type     6/2/2022  3:45 AM [employer] Add [F41 9] Anxiety       ED Disposition     ED Disposition   Discharge    Condition   Stable    Date/Time   Thu Jun 2, 2022  3:45 AM    Comment   Clare Saturnino discharge to home/self care                 Follow-up Information     Follow up With Specialties Details Why [address] [facility] Schedule an appointment as soon as possible for a visit in 3 days  [address] [facility]  [phone]              PATIENT REFERRED TO:    Syeda Correa DO  [address] [place] [id number]  [phone]    Schedule an appointment as soon as possible for a visit in 3 days        DISCHARGE MEDICATIONS:    Discharge Medication List as of 6/2/2022  3:46 AM      START taking these medications    Details   ondansetron (Zofran ODT) 4 mg disintegrating tablet Take 1 tablet (4 mg total) by mouth every 6 (six) hours as needed for nausea or vomiting, Starting Thu 6/2/2022, Print         CONTINUE these medications which have NOT CHANGED    Details   albuterol (2 5 mg/3 mL) 0 083 % nebulizer solution Take 3 mL (2 5 mg total) by nebulization every 6 (six) hours as needed for wheezing or shortness of breath, Starting Thu 10/7/2021, Normal      albuterol (PROVENTIL HFA,VENTOLIN HFA) 90 mcg/act inhaler Inhale 2 puffs every 4 (four) hours as needed for wheezing, Starting Mon 8/31/2020, Normal      atorvastatin (LIPITOR) 40 mg tablet Take 1 tablet (40 mg total) by mouth every evening, Starting Thu 10/7/2021, Normal      budesonide (Pulmicort) 0 5 mg/2 mL nebulizer solution Take 2 mL (0 5 mg total) by nebulization 2 (two) times a day for 15 days Rinse mouth after use , Starting Thu 10/7/2021, Until Fri 10/22/2021, Normal      busPIRone (BUSPAR) 10 mg tablet Take 1 tablet (10 mg total) by mouth 3 (three) times a day, Starting Thu 10/14/2021, Normal      cholecalciferol (VITAMIN D3) 1,000 units tablet Take 1 tablet (1,000 Units total) by mouth daily, Starting Fri 9/11/2020, Normal      clopidogrel (Plavix) 75 mg tablet Take 1 tablet (75 mg total) by mouth daily, Starting Thu 10/7/2021, Normal      ergocalciferol (VITAMIN D2) 50,000 units Take 1 capsule (50,000 Units total) by mouth once a week for 6 doses, Starting Fri 9/4/2020, Until Sat 10/10/2020, Normal      gabapentin (NEURONTIN) 300 mg capsule Take 300 mg by mouth 3 (three) times a day, Historical Med      hydrocortisone 1 % cream Apply topically 4 (four) times a day as needed for irritation or rash, Starting Mon 8/31/2020, Normal      meclizine (ANTIVERT) 25 mg tablet Take 1 tablet (25 mg total) by mouth every 8 (eight) hours as needed for dizziness, Starting Thu 10/14/2021, Normal      methadone (DOLOPHINE) 10 MG/5ML solution Take 84 mg by mouth daily, Historical Med      pantoprazole (PROTONIX) 40 mg tablet Take 1 tablet (40 mg total) by mouth 2 (two) times a day before meals, Starting Mon 8/31/2020, Normal      polyethylene glycol (MIRALAX) 17 g Take 1 packet (17 g total) by mouth daily as needed (constipation refractory to Senokot-S), Starting Mon 8/31/2020, Normal      senna-docusate sodium (SENOKOT S) 8 6-50 mg per tablet Take 1 tablet by mouth daily as needed for constipation, Starting Mon 8/31/2020, Normal      sucralfate (CARAFATE) 1 g tablet Take 1 tablet (1 g total) by mouth 4 (four) times a day, Starting Mon 8/31/2020, Normal      traZODone (DESYREL) 50 mg tablet TAKE 1 TABLET BY MOUTH DAILY AT BEDTIME, Normal             No discharge procedures on file  Kenneth Norman DO        This note was partially completed using voice recognition technology, and was scanned for gross errors; however some errors may still exist  Please contact the author with any questions or requests for clarification        Kenneth Norman DO  06/02/22 [id number]

## 2022-06-02 NOTE — ED NOTES
Patient presents with NVD, chills, SOB since earlier today     [facility]ruz[facility]  06/01/22 6815
